# Patient Record
Sex: FEMALE | Race: WHITE | NOT HISPANIC OR LATINO | Employment: FULL TIME | URBAN - METROPOLITAN AREA
[De-identification: names, ages, dates, MRNs, and addresses within clinical notes are randomized per-mention and may not be internally consistent; named-entity substitution may affect disease eponyms.]

---

## 2018-09-05 ENCOUNTER — OFFICE VISIT (OUTPATIENT)
Dept: SPORTS MEDICINE | Facility: CLINIC | Age: 53
End: 2018-09-05
Payer: COMMERCIAL

## 2018-09-05 VITALS
DIASTOLIC BLOOD PRESSURE: 70 MMHG | HEIGHT: 67 IN | SYSTOLIC BLOOD PRESSURE: 117 MMHG | WEIGHT: 189 LBS | HEART RATE: 69 BPM | BODY MASS INDEX: 29.66 KG/M2

## 2018-09-05 DIAGNOSIS — M70.62 TROCHANTERIC BURSITIS OF LEFT HIP: Primary | ICD-10-CM

## 2018-09-05 PROCEDURE — 99999 PR PBB SHADOW E&M-NEW PATIENT-LVL III: CPT | Mod: PBBFAC,,, | Performed by: ORTHOPAEDIC SURGERY

## 2018-09-05 PROCEDURE — 3008F BODY MASS INDEX DOCD: CPT | Mod: CPTII,S$GLB,, | Performed by: ORTHOPAEDIC SURGERY

## 2018-09-05 PROCEDURE — 99204 OFFICE O/P NEW MOD 45 MIN: CPT | Mod: S$GLB,,, | Performed by: ORTHOPAEDIC SURGERY

## 2018-09-05 RX ORDER — LOSARTAN POTASSIUM 25 MG/1
25 TABLET ORAL NIGHTLY
COMMUNITY

## 2018-09-05 NOTE — PROGRESS NOTES
CC: left hip pain    HPI:   Estela Vázquez is a pleasant 53 y.o. 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 She was self referred. She recently moved here from [facility].    She points to the lateral hip as the location of her pain. Pain has been present since March 2018. She denies trauma, no [facility] sxs/instabilty. She had previously seen an orthopaedist in [place] who performed 2 steroid injections into her trochanteric bursa for trochanteric bursitis, most recently 1 month ago, with 50% relief. She has performed PT and taken NSAIDs. She is currently not performing a home stretching program.     Affecting ADLs and exercising    Sleeping, walking, and climbing stairs activity worst    She has a history of bilateral PE after abdominal surgery    Review of Systems   Constitution: Negative. Negative for chills, fever and night sweats.   HENT: Negative for congestion and headaches.    Eyes: Negative for blurred vision, left vision loss and right vision loss.   Cardiovascular: Negative for chest pain and syncope.   Respiratory: Negative for cough and shortness of breath.    Endocrine: Negative for polydipsia, polyphagia and polyuria.   Hematologic/Lymphatic: Negative for bleeding problem. Does not bruise/bleed easily.   Skin: Negative for dry skin, itching and rash.   Musculoskeletal: Negative for falls and muscle weakness.   Gastrointestinal: Negative for abdominal pain and bowel incontinence.   Genitourinary: Negative for bladder incontinence and nocturia.   Neurological: Negative for disturbances in coordination, loss of balance and seizures.   Psychiatric/Behavioral: Negative for depression. The patient does not have insomnia.    Allergic/Immunologic: Negative for hives and persistent infections.     PAST MEDICAL HISTORY:   Past Medical History:   Diagnosis Date    Pulmonary embolism     Bilateral     PAST SURGICAL HISTORY:   Past Surgical  "History:   Procedure Laterality Date     SECTION N/A     HYSTERECTOMY Bilateral     LAPAROSCOPY W/ MINI-LAPAROTOMY Right     SHOULDER ARTHROSCOPY       FAMILY HISTORY: No family history on file.  SOCIAL HISTORY:   Social History     Socioeconomic History    Marital status:      Spouse name: Not on file    Number of children: Not on file    Years of education: Not on file    Highest education level: Not on file   Social Needs    Financial resource strain: Not on file    Food insecurity - worry: Not on file    Food insecurity - inability: Not on file    Transportation needs - medical: Not on file    Transportation needs - non-medical: Not on file   Occupational History    Not on file   Tobacco Use    Smoking status: Never Smoker    Smokeless tobacco: Never Used   Substance and Sexual Activity    Alcohol use: Not on file    Drug use: Not on file    Sexual activity: Not on file   Other Topics Concern    Not on file   Social History Narrative    Not on file       MEDICATIONS:   Current Outpatient Medications:     losartan (COZAAR) 25 MG tablet, Take 25 mg by mouth once daily., Disp: , Rfl:   ALLERGIES:   Review of patient's allergies indicates:   Allergen Reactions    Penicillins Rash       VITAL SIGNS: /70   Pulse 69   Ht 5' 7" (1.702 m)   Wt 85.7 kg (189 lb)   BMI 29.60 kg/m²        PHYSICAL EXAM /  HIP  PHYSICAL EXAMINATION  General:  The patient is alert and oriented x 3.  Mood is pleasant.  Observation of ears, eyes and nose reveal no gross abnormalities.  HEENT: NCAT, sclera nonicteric  Lungs: Respirations are equal and unlabored..    left HIP EXAMINATION     OBSERVATION / INSPECTION  Gait:   Nonantalgic   Alignment:  Neutral   Scars:   None   Muscle atrophy: None   Effusion:  None   Warmth:  None   Discoloration:   None   Leg lengths:   Equal   Pelvis:   Level     TENDERNESS / CREPITUS (T/C):      T / C  Trochanteric bursa   + / -  Piriformis    - / -  SI joint    - " / -  Psoas tendon   - / -  Rectus insertion  - / -  Adductor insertion  - / -  Pubic symphysis  - / -  Abductor muscles  +/-    ROM: (* = pain)    Flexion:    120 degrees  External rotation: 40 degrees  Internal rotation with axial load: 30 degrees  Internal rotation without axial load: 40 degrees  Abduction:  45 degrees  Adduction:   20 degrees    SPECIAL TESTS:  Pain w/ forced internal rotation (FADIR): -   Pain w/ forced external rotation (THOMAS): Absent   Circumduction test:    -  Stinchfield test:    Negative   Log roll:      Negative   Snapping hip (internal):   Negative   Sit-up pain:     Negative   Resisted sit-up pain:    Negative   Resisted sit-up with adductor contraction pain:  Negative   Step-down test:    +  Trendelenburg test:    Negative  Bridge test     ++     EXTREMITY NEURO-VASCULAR EXAMINATION:   Sensation:  Grossly intact to light touch all dermatomal regions.   Motor Function:  Fully intact motor function at hip, knee, foot and ankle    DTRs;  quadriceps and  achilles 2+.  No clonus and downgoing Babinski.    Vascular status:  DP and PT pulses 2+, brisk capillary refill, symmetric.    Skin:  intact, compartments soft.    OTHER FINDINGS:  Pain with resisted abduction, but with 5/5 strength    XRAYS:  None obtained today    Outside MRI reviewed by me demonstrating significant edema of the trochanteric bursa and partial gluteus minimus tendon insertion    ASSESSMENT:    1. left hip abd/core weakness  2. Trochanteric bursitis  3. Partial abductor tendon tear    PLAN:  she would benefit from hip bursectomy, given the above.     We have discussed the surgery and recovery of arthroscopic hip bursectomy. she understands that there may be limited weightbearing up to several weeks after surgery depending on procedures that are performed at the time of surgery.    The spectrum of treatment options were discussed with the patient, including nonoperative and operative options.  After thorough discussion,  the patient has elected to undergo surgical treatment to include:  left   a. Hip arthroscopic trochanteric bursectomy with gluteal sling release    The details of the surgical procedure were explained, including the location of probable incisions and a description of likely hardware and/or grafts to be used.  The patient understands the likely convalescence after surgery.  Also, we have thoroughly discussed the risks, benefits and alternatives to surgery, including, but not limited to, the risk of infection, joint stiffness, blood clot (including DVT and/or pulmonary embolus), neurologic and vascular injury.  It was explained that, if tissue has been repaired or reconstructed, there is a chance of failure, which may require further management.      She has a history of bilateral PE after abdominal surgery therefore we will place the patient on Lovenox post-operatively x 6 weeks.   She would like to have surgery Nov 15. She will need medical clearance

## 2018-10-15 ENCOUNTER — TELEPHONE (OUTPATIENT)
Dept: SPORTS MEDICINE | Facility: CLINIC | Age: 53
End: 2018-10-15

## 2018-10-15 NOTE — TELEPHONE ENCOUNTER
I called and spoke with the patient and she notes that her PCP would not schedule her an appointment until they had some information from our office. I called her PCP Dr. Lua at [phone] and left a message with the patient's surgical information, etc. They were encouraged to call back with any additional questions

## 2018-10-15 NOTE — TELEPHONE ENCOUNTER
----- Message from Aleisha Phillips sent at 10/15/2018 10:53 AM CDT -----  Contact: patient  Please call pt at [phone]. Patient has questions regarding the requirements for her  medical clearance     Thank you

## 2018-10-22 DIAGNOSIS — M70.62 TROCHANTERIC BURSITIS OF LEFT HIP: Primary | ICD-10-CM

## 2018-10-25 ENCOUNTER — PATIENT MESSAGE (OUTPATIENT)
Dept: SURGERY | Facility: OTHER | Age: 53
End: 2018-10-25

## 2018-11-14 ENCOUNTER — HOSPITAL ENCOUNTER (OUTPATIENT)
Dept: PREADMISSION TESTING | Facility: OTHER | Age: 53
Discharge: HOME | End: 2018-11-14
Attending: ORTHOPAEDIC SURGERY
Payer: COMMERCIAL

## 2018-11-14 ENCOUNTER — OFFICE VISIT (OUTPATIENT)
Dept: SPORTS MEDICINE | Facility: CLINIC | Age: 53
End: 2018-11-14
Payer: COMMERCIAL

## 2018-11-14 ENCOUNTER — ANESTHESIA EVENT (OUTPATIENT)
Dept: SURGERY | Facility: OTHER | Age: 53
End: 2018-11-14
Payer: COMMERCIAL

## 2018-11-14 VITALS
WEIGHT: 180 LBS | DIASTOLIC BLOOD PRESSURE: 64 MMHG | TEMPERATURE: 98 F | HEIGHT: 67 IN | SYSTOLIC BLOOD PRESSURE: 103 MMHG | OXYGEN SATURATION: 99 % | HEART RATE: 62 BPM | BODY MASS INDEX: 28.25 KG/M2

## 2018-11-14 VITALS
HEART RATE: 62 BPM | DIASTOLIC BLOOD PRESSURE: 72 MMHG | HEIGHT: 67 IN | BODY MASS INDEX: 28.25 KG/M2 | SYSTOLIC BLOOD PRESSURE: 117 MMHG | WEIGHT: 180 LBS

## 2018-11-14 DIAGNOSIS — M70.62 TROCHANTERIC BURSITIS OF LEFT HIP: Primary | ICD-10-CM

## 2018-11-14 PROCEDURE — 99999 PR PBB SHADOW E&M-EST. PATIENT-LVL IV: CPT | Mod: PBBFAC,,, | Performed by: PHYSICIAN ASSISTANT

## 2018-11-14 PROCEDURE — 99499 UNLISTED E&M SERVICE: CPT | Mod: S$GLB,,, | Performed by: PHYSICIAN ASSISTANT

## 2018-11-14 RX ORDER — OXYCODONE HYDROCHLORIDE 5 MG/1
5 TABLET ORAL ONCE AS NEEDED
Status: CANCELLED | OUTPATIENT
Start: 2018-11-14 | End: 2018-11-14

## 2018-11-14 RX ORDER — OMEPRAZOLE 20 MG/1
20 CAPSULE, DELAYED RELEASE ORAL DAILY
Qty: 25 CAPSULE | Refills: 1 | Status: SHIPPED | OUTPATIENT
Start: 2018-11-14 | End: 2021-06-02

## 2018-11-14 RX ORDER — MIDAZOLAM HYDROCHLORIDE 5 MG/ML
5 INJECTION INTRAMUSCULAR; INTRAVENOUS ONCE AS NEEDED
Status: CANCELLED | OUTPATIENT
Start: 2018-11-14 | End: 2018-11-14

## 2018-11-14 RX ORDER — LIDOCAINE HYDROCHLORIDE 10 MG/ML
0.5 INJECTION, SOLUTION EPIDURAL; INFILTRATION; INTRACAUDAL; PERINEURAL ONCE
Status: CANCELLED | OUTPATIENT
Start: 2018-11-14 | End: 2018-11-14

## 2018-11-14 RX ORDER — ASPIRIN 325 MG
325 TABLET, DELAYED RELEASE (ENTERIC COATED) ORAL EVERY 12 HOURS
Qty: 84 TABLET | Refills: 0 | Status: ON HOLD | OUTPATIENT
Start: 2018-11-14 | End: 2018-11-15 | Stop reason: CLARIF

## 2018-11-14 RX ORDER — PREGABALIN 25 MG/1
75 CAPSULE ORAL
Status: CANCELLED | OUTPATIENT
Start: 2018-11-14 | End: 2018-11-14

## 2018-11-14 RX ORDER — TRAMADOL HYDROCHLORIDE 50 MG/1
50-100 TABLET ORAL EVERY 6 HOURS PRN
Qty: 28 TABLET | Refills: 0 | Status: SHIPPED | OUTPATIENT
Start: 2018-11-14 | End: 2021-06-02

## 2018-11-14 RX ORDER — FAMOTIDINE 20 MG/1
20 TABLET, FILM COATED ORAL
Status: CANCELLED | OUTPATIENT
Start: 2018-11-14 | End: 2018-11-14

## 2018-11-14 RX ORDER — HYDROCODONE BITARTRATE AND ACETAMINOPHEN 10; 325 MG/1; MG/1
TABLET ORAL
Qty: 28 TABLET | Refills: 0 | Status: SHIPPED | OUTPATIENT
Start: 2018-11-14 | End: 2021-06-02

## 2018-11-14 RX ORDER — SODIUM CHLORIDE, SODIUM LACTATE, POTASSIUM CHLORIDE, CALCIUM CHLORIDE 600; 310; 30; 20 MG/100ML; MG/100ML; MG/100ML; MG/100ML
INJECTION, SOLUTION INTRAVENOUS CONTINUOUS
Status: CANCELLED | OUTPATIENT
Start: 2018-11-14

## 2018-11-14 RX ORDER — PROMETHAZINE HYDROCHLORIDE 25 MG/1
25 TABLET ORAL EVERY 6 HOURS PRN
Qty: 16 TABLET | Refills: 0 | Status: SHIPPED | OUTPATIENT
Start: 2018-11-14 | End: 2021-06-02

## 2018-11-14 NOTE — ANESTHESIA PREPROCEDURE EVALUATION
11/14/2018  Estela Vázquez is a 53 y.o., female.    Anesthesia Evaluation    I have reviewed the Patient Summary Reports.    I have reviewed the Nursing Notes.   I have reviewed the Medications.     Review of Systems  Anesthesia Hx:  No problems with previous Anesthesia    Social:  Social Alcohol Use, No Alcohol Use    Hematology/Oncology:  Hematology Normal   Oncology Normal     EENT/Dental:EENT/Dental Normal   Cardiovascular:  Cardiovascular Normal Exercise tolerance: good     Pulmonary:  Pulmonary Normal    Renal/:  Renal/ Normal     Hepatic/GI:  Hepatic/GI Normal    Musculoskeletal:  Musculoskeletal Normal    Neurological:  Neurology Normal    Endocrine:  Endocrine Normal    Dermatological:  Skin Normal    Psych:  Psychiatric Normal           Physical Exam  General:  Well nourished    Airway/Jaw/Neck:  Airway Findings: Mouth Opening: Normal Tongue: Normal  General Airway Assessment: Adult, Good  Mallampati: I  TM Distance: Normal, at least 6 cm  Jaw/Neck Findings:  Neck ROM: Normal ROM      Dental:  Dental Findings: In tact        Mental Status:  Mental Status Findings:  Cooperative, Alert and Oriented         Anesthesia Plan  Type of Anesthesia, risks & benefits discussed:  Anesthesia Type:  general  Patient's Preference:   Intra-op Monitoring Plan: standard ASA monitors  Intra-op Monitoring Plan Comments:   Post Op Pain Control Plan: multimodal analgesia  Post Op Pain Control Plan Comments:   Induction:   IV  Beta Blocker:         Informed Consent: Patient understands risks and agrees with Anesthesia plan.  Questions answered. Anesthesia consent signed with patient.  ASA Score: 2     Day of Surgery Review of History & Physical:    H&P update referred to the surgeon.     Anesthesia Plan Notes: Cleared by cardiology, pt has multiple PVCs. Pt is a nurse. Outside labs are good.        Ready For  Surgery From Anesthesia Perspective.

## 2018-11-14 NOTE — DISCHARGE INSTRUCTIONS
PRE-ADMIT TESTING -  [phone]    [address]  [place] [facility]          Your surgery has been scheduled at [facility]. We are pleased to have the opportunity to serve you. For Further Information please call [phone].    On the day of surgery please report to the Information Desk on the 1st floor.    · CONTACT YOUR PHYSICIAN'S OFFICE THE DAY PRIOR TO YOUR SURGERY TO OBTAIN YOUR ARRIVAL TIME.     · The evening before surgery do not eat anything after 9 p.m. ( this includes hard candy, chewing gum and mints).  You may only have GATORADE, POWERADE AND WATER  from 9 p.m. until you leave your home.   DO NOT DRINK ANY LIQUIDS ON THE WAY TO THE HOSPITAL.      SPECIAL MEDICATION INSTRUCTIONS: TAKE medications checked off by the Anesthesiologist on your Medication List.    Angiogram Patients: Take medications as instructed by your physician, including aspirin.     Surgery Patients:    If you take ASPIRIN - Your PHYSICIAN/SURGEON will need to inform you IF/OR when you need to stop taking aspirin prior to your surgery.     Do Not take any medications containing IBUPROFEN.  Do Not Wear any make-up or dark nail polish   (especially eye make-up) to surgery. If you come to surgery with makeup on you will be required to remove the makeup or nail polish.    Do not shave your surgical area at least 5 days prior to your surgery. The surgical prep will be performed at the hospital according to Infection Control regulations.    Leave all valuables at home.   Do Not wear any jewelry or watches, including any metal in body piercings.  Contact Lens must be removed before surgery. Either do not wear the contact lens or bring a case and solution for storage.  Please bring a container for eyeglasses or dentures as required.  Bring any paperwork your physician has provided, such as consent forms,  history and physicals, doctor's orders, etc.   Bring comfortable clothes that are loose fitting to wear upon  discharge. Take into consideration the type of surgery being performed.  Maintain your diet as advised per your physician the day prior to surgery.      Adequate rest the night before surgery is advised.   Park in the Parking lot behind the hospital or in the [facility] across the street from the parking lot. Parking is complimentary.  If you will be discharged the same day as your procedure, please arrange for a responsible adult to drive you home or to accompany you if traveling by taxi.   YOU WILL NOT BE PERMITTED TO DRIVE OR TO LEAVE THE HOSPITAL ALONE AFTER SURGERY.   It is strongly recommended that you arrange for someone to remain with you for the first 24 hrs following your surgery.       Thank you for your cooperation.  The Staff of [facility].        Bathing Instructions                                                                 Please shower the evening before and morning of your procedure with    ANTIBACTERIAL SOAP. ( DIAL, etc )  Concentrate on the surgical area   for at least 3 minutes and rinse completely. Dry off as usual.   Do not use any deodorant, powder, body lotions, perfume, after shave or    cologne.

## 2018-11-15 ENCOUNTER — HOSPITAL ENCOUNTER (OUTPATIENT)
Facility: OTHER | Age: 53
Discharge: HOME | End: 2018-11-15
Attending: ORTHOPAEDIC SURGERY | Admitting: ORTHOPAEDIC SURGERY
Payer: COMMERCIAL

## 2018-11-15 ENCOUNTER — TELEPHONE (OUTPATIENT)
Dept: SPORTS MEDICINE | Facility: CLINIC | Age: 53
End: 2018-11-15

## 2018-11-15 ENCOUNTER — ANESTHESIA (OUTPATIENT)
Dept: SURGERY | Facility: OTHER | Age: 53
End: 2018-11-15
Payer: COMMERCIAL

## 2018-11-15 VITALS
TEMPERATURE: 99 F | RESPIRATION RATE: 16 BRPM | HEART RATE: 56 BPM | DIASTOLIC BLOOD PRESSURE: 58 MMHG | BODY MASS INDEX: 28.25 KG/M2 | SYSTOLIC BLOOD PRESSURE: 106 MMHG | HEIGHT: 67 IN | WEIGHT: 180 LBS | OXYGEN SATURATION: 96 %

## 2018-11-15 DIAGNOSIS — Z86.718 HISTORY OF DVT (DEEP VEIN THROMBOSIS): ICD-10-CM

## 2018-11-15 DIAGNOSIS — M70.62 TROCHANTERIC BURSITIS OF LEFT HIP: Primary | ICD-10-CM

## 2018-11-15 DIAGNOSIS — M70.62 TROCHANTERIC BURSITIS OF LEFT HIP: ICD-10-CM

## 2018-11-15 PROCEDURE — 27025 FASCIOTOMY HIP/THIGH ANY TYP: CPT | Mod: 51,LT,, | Performed by: ORTHOPAEDIC SURGERY

## 2018-11-15 PROCEDURE — 90686 IIV4 VACC NO PRSV 0.5 ML IM: CPT | Performed by: ORTHOPAEDIC SURGERY

## 2018-11-15 PROCEDURE — 71000033 HC RECOVERY, INTIAL HOUR: Performed by: ORTHOPAEDIC SURGERY

## 2018-11-15 PROCEDURE — 25000003 PHARM REV CODE 250: Performed by: PHYSICIAN ASSISTANT

## 2018-11-15 PROCEDURE — 63600175 PHARM REV CODE 636 W HCPCS: Performed by: NURSE ANESTHETIST, CERTIFIED REGISTERED

## 2018-11-15 PROCEDURE — 63600175 PHARM REV CODE 636 W HCPCS: Performed by: ORTHOPAEDIC SURGERY

## 2018-11-15 PROCEDURE — 71000015 HC POSTOP RECOV 1ST HR: Performed by: ORTHOPAEDIC SURGERY

## 2018-11-15 PROCEDURE — 37000008 HC ANESTHESIA 1ST 15 MINUTES: Performed by: ORTHOPAEDIC SURGERY

## 2018-11-15 PROCEDURE — 25000003 PHARM REV CODE 250: Performed by: NURSE ANESTHETIST, CERTIFIED REGISTERED

## 2018-11-15 PROCEDURE — 36000710: Performed by: ORTHOPAEDIC SURGERY

## 2018-11-15 PROCEDURE — 90471 IMMUNIZATION ADMIN: CPT | Performed by: ORTHOPAEDIC SURGERY

## 2018-11-15 PROCEDURE — 27201423 OPTIME MED/SURG SUP & DEVICES STERILE SUPPLY: Performed by: ORTHOPAEDIC SURGERY

## 2018-11-15 PROCEDURE — 25000003 PHARM REV CODE 250: Performed by: ORTHOPAEDIC SURGERY

## 2018-11-15 PROCEDURE — 25000003 PHARM REV CODE 250: Performed by: SPECIALIST

## 2018-11-15 PROCEDURE — 36000711: Performed by: ORTHOPAEDIC SURGERY

## 2018-11-15 PROCEDURE — 71000039 HC RECOVERY, EACH ADD'L HOUR: Performed by: ORTHOPAEDIC SURGERY

## 2018-11-15 PROCEDURE — 63600175 PHARM REV CODE 636 W HCPCS: Performed by: PHYSICIAN ASSISTANT

## 2018-11-15 PROCEDURE — 27062 REMOVE FEMUR LESION/BURSA: CPT | Mod: LT,,, | Performed by: ORTHOPAEDIC SURGERY

## 2018-11-15 PROCEDURE — 37000009 HC ANESTHESIA EA ADD 15 MINS: Performed by: ORTHOPAEDIC SURGERY

## 2018-11-15 PROCEDURE — 71000016 HC POSTOP RECOV ADDL HR: Performed by: ORTHOPAEDIC SURGERY

## 2018-11-15 RX ORDER — ONDANSETRON HYDROCHLORIDE 2 MG/ML
INJECTION, SOLUTION INTRAMUSCULAR; INTRAVENOUS
Status: DISCONTINUED | OUTPATIENT
Start: 2018-11-15 | End: 2018-11-15

## 2018-11-15 RX ORDER — KETAMINE HYDROCHLORIDE 50 MG/ML
INJECTION, SOLUTION INTRAMUSCULAR; INTRAVENOUS
Status: DISCONTINUED | OUTPATIENT
Start: 2018-11-15 | End: 2018-11-15 | Stop reason: HOSPADM

## 2018-11-15 RX ORDER — MEPERIDINE HYDROCHLORIDE 50 MG/ML
12.5 INJECTION INTRAMUSCULAR; INTRAVENOUS; SUBCUTANEOUS ONCE AS NEEDED
Status: DISCONTINUED | OUTPATIENT
Start: 2018-11-15 | End: 2018-11-15 | Stop reason: HOSPADM

## 2018-11-15 RX ORDER — EPINEPHRINE 1 MG/ML
INJECTION, SOLUTION INTRACARDIAC; INTRAMUSCULAR; INTRAVENOUS; SUBCUTANEOUS
Status: DISCONTINUED | OUTPATIENT
Start: 2018-11-15 | End: 2018-11-15 | Stop reason: HOSPADM

## 2018-11-15 RX ORDER — DEXAMETHASONE SODIUM PHOSPHATE 4 MG/ML
INJECTION, SOLUTION INTRA-ARTICULAR; INTRALESIONAL; INTRAMUSCULAR; INTRAVENOUS; SOFT TISSUE
Status: DISCONTINUED | OUTPATIENT
Start: 2018-11-15 | End: 2018-11-15

## 2018-11-15 RX ORDER — NEOSTIGMINE METHYLSULFATE 1 MG/ML
INJECTION, SOLUTION INTRAVENOUS
Status: DISCONTINUED | OUTPATIENT
Start: 2018-11-15 | End: 2018-11-15

## 2018-11-15 RX ORDER — MIDAZOLAM HYDROCHLORIDE 5 MG/ML
5 INJECTION INTRAMUSCULAR; INTRAVENOUS ONCE AS NEEDED
Status: DISCONTINUED | OUTPATIENT
Start: 2018-11-15 | End: 2018-11-15 | Stop reason: HOSPADM

## 2018-11-15 RX ORDER — ENOXAPARIN SODIUM 100 MG/ML
40 INJECTION SUBCUTANEOUS DAILY
Qty: 42 SYRINGE | Refills: 0 | Status: SHIPPED | OUTPATIENT
Start: 2018-11-15 | End: 2018-12-27

## 2018-11-15 RX ORDER — OXYCODONE HYDROCHLORIDE 5 MG/1
5 TABLET ORAL
Status: DISCONTINUED | OUTPATIENT
Start: 2018-11-15 | End: 2018-11-15 | Stop reason: HOSPADM

## 2018-11-15 RX ORDER — HYDROMORPHONE HYDROCHLORIDE 2 MG/ML
0.4 INJECTION, SOLUTION INTRAMUSCULAR; INTRAVENOUS; SUBCUTANEOUS EVERY 5 MIN PRN
Status: DISCONTINUED | OUTPATIENT
Start: 2018-11-15 | End: 2018-11-15 | Stop reason: HOSPADM

## 2018-11-15 RX ORDER — OXYCODONE HYDROCHLORIDE 5 MG/1
10 TABLET ORAL EVERY 4 HOURS PRN
Status: DISCONTINUED | OUTPATIENT
Start: 2018-11-15 | End: 2018-11-15 | Stop reason: HOSPADM

## 2018-11-15 RX ORDER — PROPOFOL 10 MG/ML
VIAL (ML) INTRAVENOUS
Status: DISCONTINUED | OUTPATIENT
Start: 2018-11-15 | End: 2018-11-15

## 2018-11-15 RX ORDER — KETOROLAC TROMETHAMINE 30 MG/ML
INJECTION, SOLUTION INTRAMUSCULAR; INTRAVENOUS
Status: DISCONTINUED | OUTPATIENT
Start: 2018-11-15 | End: 2018-11-15 | Stop reason: HOSPADM

## 2018-11-15 RX ORDER — EPHEDRINE SULFATE 50 MG/ML
INJECTION, SOLUTION INTRAVENOUS
Status: DISCONTINUED | OUTPATIENT
Start: 2018-11-15 | End: 2018-11-15

## 2018-11-15 RX ORDER — LIDOCAINE HYDROCHLORIDE 10 MG/ML
0.5 INJECTION, SOLUTION EPIDURAL; INFILTRATION; INTRACAUDAL; PERINEURAL ONCE
Status: DISCONTINUED | OUTPATIENT
Start: 2018-11-15 | End: 2018-11-15 | Stop reason: HOSPADM

## 2018-11-15 RX ORDER — KETOROLAC TROMETHAMINE 30 MG/ML
INJECTION, SOLUTION INTRAMUSCULAR; INTRAVENOUS
Status: DISCONTINUED | OUTPATIENT
Start: 2018-11-15 | End: 2018-11-15

## 2018-11-15 RX ORDER — LIDOCAINE HCL/PF 100 MG/5ML
SYRINGE (ML) INTRAVENOUS
Status: DISCONTINUED | OUTPATIENT
Start: 2018-11-15 | End: 2018-11-15

## 2018-11-15 RX ORDER — FENTANYL CITRATE 50 UG/ML
25 INJECTION, SOLUTION INTRAMUSCULAR; INTRAVENOUS EVERY 5 MIN PRN
Status: DISCONTINUED | OUTPATIENT
Start: 2018-11-15 | End: 2018-11-15 | Stop reason: HOSPADM

## 2018-11-15 RX ORDER — ENOXAPARIN SODIUM 100 MG/ML
40 INJECTION SUBCUTANEOUS DAILY
Qty: 42 SYRINGE | Refills: 0 | Status: SHIPPED | OUTPATIENT
Start: 2018-11-15 | End: 2018-11-15 | Stop reason: RX

## 2018-11-15 RX ORDER — ROPIVACAINE HYDROCHLORIDE 5 MG/ML
INJECTION, SOLUTION EPIDURAL; INFILTRATION; PERINEURAL
Status: DISCONTINUED | OUTPATIENT
Start: 2018-11-15 | End: 2018-11-15 | Stop reason: HOSPADM

## 2018-11-15 RX ORDER — MIDAZOLAM HYDROCHLORIDE 1 MG/ML
INJECTION INTRAMUSCULAR; INTRAVENOUS
Status: DISCONTINUED | OUTPATIENT
Start: 2018-11-15 | End: 2018-11-15

## 2018-11-15 RX ORDER — PREGABALIN 75 MG/1
75 CAPSULE ORAL ONCE
Status: COMPLETED | OUTPATIENT
Start: 2018-11-15 | End: 2018-11-15

## 2018-11-15 RX ORDER — SODIUM CHLORIDE, SODIUM LACTATE, POTASSIUM CHLORIDE, CALCIUM CHLORIDE 600; 310; 30; 20 MG/100ML; MG/100ML; MG/100ML; MG/100ML
INJECTION, SOLUTION INTRAVENOUS CONTINUOUS
Status: DISCONTINUED | OUTPATIENT
Start: 2018-11-15 | End: 2018-11-15 | Stop reason: HOSPADM

## 2018-11-15 RX ORDER — ROCURONIUM BROMIDE 10 MG/ML
INJECTION, SOLUTION INTRAVENOUS
Status: DISCONTINUED | OUTPATIENT
Start: 2018-11-15 | End: 2018-11-15

## 2018-11-15 RX ORDER — FENTANYL CITRATE 50 UG/ML
INJECTION, SOLUTION INTRAMUSCULAR; INTRAVENOUS
Status: DISCONTINUED | OUTPATIENT
Start: 2018-11-15 | End: 2018-11-15

## 2018-11-15 RX ORDER — PREGABALIN 75 MG/1
75 CAPSULE ORAL
Status: COMPLETED | OUTPATIENT
Start: 2018-11-15 | End: 2018-11-15

## 2018-11-15 RX ORDER — PROMETHAZINE HYDROCHLORIDE 25 MG/1
25 TABLET ORAL EVERY 6 HOURS PRN
Status: DISCONTINUED | OUTPATIENT
Start: 2018-11-15 | End: 2018-11-15 | Stop reason: HOSPADM

## 2018-11-15 RX ORDER — ONDANSETRON 2 MG/ML
4 INJECTION INTRAMUSCULAR; INTRAVENOUS DAILY PRN
Status: DISCONTINUED | OUTPATIENT
Start: 2018-11-15 | End: 2018-11-15 | Stop reason: HOSPADM

## 2018-11-15 RX ORDER — MORPHINE SULFATE 10 MG/ML
3 INJECTION INTRAMUSCULAR; INTRAVENOUS; SUBCUTANEOUS EVERY 10 MIN PRN
Status: DISCONTINUED | OUTPATIENT
Start: 2018-11-15 | End: 2018-11-15 | Stop reason: HOSPADM

## 2018-11-15 RX ORDER — FAMOTIDINE 20 MG/1
20 TABLET, FILM COATED ORAL
Status: COMPLETED | OUTPATIENT
Start: 2018-11-15 | End: 2018-11-15

## 2018-11-15 RX ORDER — SODIUM CHLORIDE 0.9 % (FLUSH) 0.9 %
3 SYRINGE (ML) INJECTION
Status: DISCONTINUED | OUTPATIENT
Start: 2018-11-15 | End: 2018-11-15 | Stop reason: HOSPADM

## 2018-11-15 RX ORDER — GLYCOPYRROLATE 0.2 MG/ML
INJECTION INTRAMUSCULAR; INTRAVENOUS
Status: DISCONTINUED | OUTPATIENT
Start: 2018-11-15 | End: 2018-11-15

## 2018-11-15 RX ORDER — ONDANSETRON 2 MG/ML
4 INJECTION INTRAMUSCULAR; INTRAVENOUS EVERY 12 HOURS PRN
Status: DISCONTINUED | OUTPATIENT
Start: 2018-11-15 | End: 2018-11-15 | Stop reason: HOSPADM

## 2018-11-15 RX ORDER — OXYCODONE HYDROCHLORIDE 5 MG/1
5 TABLET ORAL ONCE AS NEEDED
Status: COMPLETED | OUTPATIENT
Start: 2018-11-15 | End: 2018-11-15

## 2018-11-15 RX ADMIN — SODIUM CHLORIDE, SODIUM LACTATE, POTASSIUM CHLORIDE, AND CALCIUM CHLORIDE: 600; 310; 30; 20 INJECTION, SOLUTION INTRAVENOUS at 10:11

## 2018-11-15 RX ADMIN — SODIUM CHLORIDE, SODIUM LACTATE, POTASSIUM CHLORIDE, AND CALCIUM CHLORIDE: 600; 310; 30; 20 INJECTION, SOLUTION INTRAVENOUS at 04:11

## 2018-11-15 RX ADMIN — FENTANYL CITRATE 50 MCG: 50 INJECTION, SOLUTION INTRAMUSCULAR; INTRAVENOUS at 03:11

## 2018-11-15 RX ADMIN — FENTANYL CITRATE 100 MCG: 50 INJECTION, SOLUTION INTRAMUSCULAR; INTRAVENOUS at 03:11

## 2018-11-15 RX ADMIN — VANCOMYCIN HYDROCHLORIDE 1500 MG: 1 INJECTION, POWDER, LYOPHILIZED, FOR SOLUTION INTRAVENOUS at 10:11

## 2018-11-15 RX ADMIN — MIDAZOLAM HYDROCHLORIDE 2 MG: 1 INJECTION, SOLUTION INTRAMUSCULAR; INTRAVENOUS at 03:11

## 2018-11-15 RX ADMIN — DEXAMETHASONE SODIUM PHOSPHATE 8 MG: 4 INJECTION, SOLUTION INTRAMUSCULAR; INTRAVENOUS at 03:11

## 2018-11-15 RX ADMIN — PREGABALIN 75 MG: 75 CAPSULE ORAL at 06:11

## 2018-11-15 RX ADMIN — KETOROLAC TROMETHAMINE 30 MG: 30 INJECTION, SOLUTION INTRAMUSCULAR; INTRAVENOUS at 04:11

## 2018-11-15 RX ADMIN — EPHEDRINE SULFATE 10 MG: 50 INJECTION INTRAMUSCULAR; INTRAVENOUS; SUBCUTANEOUS at 04:11

## 2018-11-15 RX ADMIN — ONDANSETRON 4 MG: 2 INJECTION, SOLUTION INTRAMUSCULAR; INTRAVENOUS at 03:11

## 2018-11-15 RX ADMIN — EPHEDRINE SULFATE 10 MG: 50 INJECTION INTRAMUSCULAR; INTRAVENOUS; SUBCUTANEOUS at 03:11

## 2018-11-15 RX ADMIN — INFLUENZA A VIRUS A/MICHIGAN/45/2015 X-275 (H1N1) ANTIGEN (FORMALDEHYDE INACTIVATED), INFLUENZA A VIRUS A/SINGAPORE/INFIMH-16-0019/2016 IVR-186 (H3N2) ANTIGEN (FORMALDEHYDE INACTIVATED), INFLUENZA B VIRUS B/PHUKET/3073/2013 ANTIGEN (FORMALDEHYDE INACTIVATED), AND INFLUENZA B VIRUS B/MARYLAND/15/2016 BX-69A ANTIGEN (FORMALDEHYDE INACTIVATED) 0.5 ML: 15; 15; 15; 15 INJECTION, SUSPENSION INTRAMUSCULAR at 06:11

## 2018-11-15 RX ADMIN — LIDOCAINE HYDROCHLORIDE 100 MG: 20 INJECTION, SOLUTION INTRAVENOUS at 03:11

## 2018-11-15 RX ADMIN — FAMOTIDINE 20 MG: 20 TABLET ORAL at 10:11

## 2018-11-15 RX ADMIN — ROCURONIUM BROMIDE 40 MG: 10 INJECTION, SOLUTION INTRAVENOUS at 03:11

## 2018-11-15 RX ADMIN — CARBOXYMETHYLCELLULOSE SODIUM 2 DROP: 2.5 SOLUTION/ DROPS OPHTHALMIC at 03:11

## 2018-11-15 RX ADMIN — OXYCODONE HYDROCHLORIDE 5 MG: 5 TABLET ORAL at 04:11

## 2018-11-15 RX ADMIN — GLYCOPYRROLATE 0.6 MG: 0.2 INJECTION, SOLUTION INTRAMUSCULAR; INTRAVENOUS at 04:11

## 2018-11-15 RX ADMIN — PROPOFOL 150 MG: 10 INJECTION, EMULSION INTRAVENOUS at 03:11

## 2018-11-15 RX ADMIN — NEOSTIGMINE METHYLSULFATE 5 MG: 1 INJECTION INTRAVENOUS at 04:11

## 2018-11-15 RX ADMIN — PREGABALIN 75 MG: 75 CAPSULE ORAL at 10:11

## 2018-11-15 NOTE — OR NURSING
Pt status unchanged.  Vancomycin IVPB completed.  IV flushed.  Assisted pt up to bathroom.  Updated re: surgery time.

## 2018-11-15 NOTE — DISCHARGE SUMMARY
[facility]  Brief Operative Note     SUMMARY     Surgery Date: 11/15/2018     Surgeon(s) and Role:     * Nika Barreto MD - Primary    Assisting Surgeon: None    Pre-op Diagnosis:  Trochanteric bursitis of left hip [M70.62]    Post-op Diagnosis:  Post-Op Diagnosis Codes:     * Trochanteric bursitis of left hip [M70.62]    Procedure(s) (LRB):  REPAIR, Trochanteric Bursectomy (Left)    Anesthesia: General    Description of the findings of the procedure: Left hip arthroscopic trochanteric bursectomy     Findings/Key Components: left hip arthroscopic trochanteric bursectomy    Estimated Blood Loss: minimal         Specimens:   Specimen (12h ago, onward)    None          Discharge Note    SUMMARY     Admit Date: 11/15/2018    Discharge Date and Time:  11/15/2018 5:00 PM    Hospital Course (synopsis of major diagnoses, care, treatment, and services provided during the course of the hospital stay): Patient underwent outpatient hip surgery and was transferred to PACU in stable condition.  In PACU, patient received appropriate post-operative care and discharged home with plans for physical therapy and follow-up with the operative surgeon.    Diet: Regular     Final Diagnosis: Post-Op Diagnosis Codes:     * Trochanteric bursitis of left hip [M70.62]    Disposition: Home or Self Care    Follow Up/Patient Instructions:     Medications:  Reconciled Home Medications:      Medication List      CONTINUE taking these medications    aspirin 325 MG EC tablet  Commonly known as:  ECOTRIN  Take 1 tablet (325 mg total) by mouth every 12 (twelve) hours.     HYDROcodone-acetaminophen  mg per tablet  Commonly known as:  NORCO  Take 1 tablet by mouth every 4-6 hours for pain.     losartan 25 MG tablet  Commonly known as:  COZAAR  Take 25 mg by mouth every evening.     omeprazole 20 MG capsule  Commonly known as:  PriLOSEC  Take 1 capsule (20 mg total) by mouth once daily. To protect your stomach.     promethazine 25  "MG tablet  Commonly known as:  PHENERGAN  Take 1 tablet (25 mg total) by mouth every 6 (six) hours as needed for Nausea.     traMADol 50 mg tablet  Commonly known as:  ULTRAM  Take 1-2 tablets ( mg total) by mouth every 6 (six) hours as needed (surgical pain).          Discharge Procedure Orders   CRUTCHES FOR HOME USE   Order Comments: Provide if needed     Order Specific Question Answer Comments   Type: Axillary    Height: 5' 7" (1.702 m)    Weight: 81.6 kg (179 lb 16 oz)    Does patient have medical equipment at home? none    Length of need (1-99 months): 24      Diet general     Call MD for:  temperature >100.4     Call MD for:  persistent nausea and vomiting     Call MD for:  severe uncontrolled pain     Call MD for:  difficulty breathing, headache or visual disturbances     Call MD for:  redness, tenderness, or signs of infection (pain, swelling, redness, odor or green/yellow discharge around incision site)     Call MD for:  hives     Call MD for:  persistent dizziness or light-headedness     Ice to affected area     No driving, operating heavy equipment or signing legal documents while taking pain medication     Remove dressing in 72 hours     Prior Authorization Order     Order Specific Question Answer Comments   What medications need authorization? LYRICA [[id number]]    Dose 75mg lyrica    Frequency once pre-op and once post-op      Shower on day dressing removed (No bath)     Weight bearing restrictions (specify)   Order Comments: Partial weightbearing of about 50% for 1 week following surgery using crutches and then can begin to wean off of them. PT to start in 7-14 days following surgery.       "

## 2018-11-15 NOTE — H&P
Estela Vázquez  is here for a completion of her perioperative paperwork. she  Is scheduled to undergo    left              a. Hip arthroscopic trochanteric bursectomy with gluteal sling release on 11/15/18.      She is a healthy individual and does need clearance for this procedure which she received from her external PCP, Dr. Chatterjee.     PAST MEDICAL HISTORY:   Past Medical History:   Diagnosis Date    Arrhythmia     bigeminy & trigeminy     followed cardiologist    Pulmonary embolism     Bilateral     PAST SURGICAL HISTORY:   Past Surgical History:   Procedure Laterality Date    APPENDECTOMY       SECTION N/A     HYSTERECTOMY Bilateral     LAPAROSCOPY W/ MINI-LAPAROTOMY Right     SHOULDER ARTHROSCOPY       FAMILY HISTORY: History reviewed. No pertinent family history.  SOCIAL HISTORY:   Social History     Socioeconomic History    Marital status:      Spouse name: Not on file    Number of children: Not on file    Years of education: Not on file    Highest education level: Not on file   Social Needs    Financial resource strain: Not on file    Food insecurity - worry: Not on file    Food insecurity - inability: Not on file    Transportation needs - medical: Not on file    Transportation needs - non-medical: Not on file   Occupational History    Not on file   Tobacco Use    Smoking status: Never Smoker    Smokeless tobacco: Never Used   Substance and Sexual Activity    Alcohol use: Yes     Comment: socially    Drug use: Not on file    Sexual activity: Not on file   Other Topics Concern    Not on file   Social History Narrative    Not on file       MEDICATIONS:   Current Outpatient Medications:     losartan (COZAAR) 25 MG tablet, Take 25 mg by mouth every evening. , Disp: , Rfl:     aspirin (ECOTRIN) 325 MG EC tablet, Take 1 tablet (325 mg total) by mouth every 12 (twelve) hours., Disp: 84 tablet, Rfl: 0    HYDROcodone-acetaminophen (NORCO)  mg per tablet, Take 1  "tablet by mouth every 4-6 hours for pain., Disp: 28 tablet, Rfl: 0    omeprazole (PRILOSEC) 20 MG capsule, Take 1 capsule (20 mg total) by mouth once daily. To protect your stomach., Disp: 25 capsule, Rfl: 1    promethazine (PHENERGAN) 25 MG tablet, Take 1 tablet (25 mg total) by mouth every 6 (six) hours as needed for Nausea., Disp: 16 tablet, Rfl: 0    traMADol (ULTRAM) 50 mg tablet, Take 1-2 tablets ( mg total) by mouth every 6 (six) hours as needed (surgical pain)., Disp: 28 tablet, Rfl: 0  ALLERGIES:   Review of patient's allergies indicates:   Allergen Reactions    Penicillins Rash       VITAL SIGNS: /72   Pulse 62   Ht 5' 7" (1.702 m)   Wt 81.6 kg (180 lb)   BMI 28.19 kg/m²      Risks, indications and benefits of the surgical procedure were discussed with the patient. All questions with regard to surgery, rehab, expected return to functional activities, activities of daily living and recreational endeavors were answered to her satisfaction.    It was explained to the patient that there may be an increase in surgical risks if the patient has certain co-morbidities such as but not limited to: Obesity, Cardiovascular issues (CHF, CAD, Arrhythmias), chronic pulmonary issues, previous or current neurovascular/neurological issues, previous strokes, diabetes mellitus, previous wound healing issues, previous wound or skin infections, PVD, clotting disorders, if the patient uses chronic steroids, if the patient takes or has immune compromising medications or diseases, or has previously or currently used tobacco products.     The patient verbalized that he/she does not have any additional clotting, bleeding, or blood disorders, other than what is list in her chart on today's review.     Then a brief history and physical exam were performed.    Review of Systems   Constitution: Negative. Negative for chills, fever and night sweats.   HENT: Negative for congestion and headaches.    Eyes: Negative for " blurred vision, left vision loss and right vision loss.   Cardiovascular: Negative for chest pain and syncope.   Respiratory: Negative for cough and shortness of breath.    Endocrine: Negative for polydipsia, polyphagia and polyuria.   Hematologic/Lymphatic: Negative for bleeding problem. Does not bruise/bleed easily.   Skin: Negative for dry skin, itching and rash.   Musculoskeletal: Negative for falls and muscle weakness.   Gastrointestinal: Negative for abdominal pain and bowel incontinence.   Genitourinary: Negative for bladder incontinence and nocturia.   Neurological: Negative for disturbances in coordination, loss of balance and seizures.   Psychiatric/Behavioral: Negative for depression. The patient does not have insomnia.    Allergic/Immunologic: Negative for hives and persistent infections.     PHYSICAL EXAM:  GEN: A&Ox3, WD WN NAD  HEENT: WNL  CHEST: CTAB, no W/R/R  HEART: RRR, no M/R/G  ABD: Soft, NT ND, BS x4 QUADS  MS; See Epic  NEURO: CN II-XII intact       The surgical consent was then reviewed with the patient, who agreed with all the contents of the consent form and it was signed. she was then given the [facility] [place] surgery packet to bring with her to [facility] for the anesthesia portion of her perioperative paperwork.   For all physicians except for Dr. Gama, we will email and possibly fax the consent forms and [facility]    The patient was given the opportunity to ask questions about the surgical plan and consent form, and once no other questions were asked, I proceeded with the pre-op appointment.    PHYSICAL THERAPY:  She was also instructed regarding physical therapy and will begin on  1-2 weeks post-op. She was given a copy of the original prescription to schedule. Another copy of this prescription was also faxed to [facility] where she will start and then transition to a PT place back home in [place].    POST OP CARE:instructions were reviewed  including care of the wound and dressing after surgery and when she can shower.     PAIN MANAGEMENT: Estela Vázquez was also given her pain management regime, which includes the TENS unit given to her by priti along with the education required for its use. She was also instructed regarding the Polar ice unit that will be in place after surgery and her postoperative pain medications.     PAIN MEDICATION:  Percocet 10/325mg 1 po q 4-6 hours prn pain  Ultram 50 mg Take 1-2 p.o. q.6 hours p.r.n. breakthrough pain,   Phenergan 25 mg one p.o. q.6 hours p.r.n. nausea and vomiting.    DVT prophylaxis was discussed with the patient today including risk factors for developing DVTs and history of DVTs. The patient was asked if any specific recommendations were given from the doctor/s that did pre-operative surgical clearance. The patient was then given an education sheet about DVTs and PE with warning signs and symptoms of both and steps to take if they suspect either of these.    This along with the Modified Caprini risk assessment model for VTE in general surgical patients was used to determine the patient's DVT risk.     From: Dacia MK, Abel DA, Megan SM, et al. Prevention of VTE in nonorthopedic surgical patients: antithrombotic therapy and prevention of thrombosis, 9th ed: American College of Chest Physicians evidence-based clinical practical guidelines. Chest 2012; 141:e227S. Copyright © 2012. Reproduced with permission from the American College of Chest Physicians.    The below listed DVT prophylaxis regimen along with bilateral CORY compression stockings will be used post-op. Length of treatment has been determined to be 10-42 days post-op by the above noted Caprini assessment model.     The patient was instructed to buy and take:  Aspirin 325mg BID x 6 weeks for DVT prophylaxis starting on the evening after surgery.  Patient will also use bilateral TEDs on lower extremities, SCDs during surgery, and early  ambulation post-op. If the patient was previously taking 81mg baby aspirin, they were told to not take it will using the above stated aspirin and to restart the 81mg aspirin after completion of the aspirin dose.     Patient was also told to buy over the counter Prilosec medication and take it once daily for GI protection as long as they are taking NSAIDs or Aspirin.     Published data in Ben STALLWORTH, et al. J Arthroplasty. Oct; 31(10):2237-40, 2016; showed that aspirin use as prophylaxis during revision total joint arthroplasty was more effective than warfarin in preventing symptomatic venous thromboembolic events and was associated with lower complications.  Patients in the study were also treated with intermittent pneumatic compression devices. Compression stockings would be our method of mechanical prophylaxis, which has been shown to be similar to pneumatic compression in the systematic review, All RJ, et al. Jaycee Surg. Feb; 239(2): 162-171, 2004.     Results showed a significantly higher incidence of symptomatic venous thromboembolic events among patients in the warfarin group vs. the aspirin group (1.75% vs. 0.56%). Researchers also noted a bleeding event rate of 1.5% among patients who received warfarin compared with a rate of 0.4% among patients who received aspirin.    Patient denies history of seizures.     The patient was told that narcotic pain medications may make them drowsy and instructions were given to not sign legal documents, drive or operate heavy machinery, cars, or equipment while under the influence of narcotic medications. The patient was told and understands that narcotic pain medications should only be used as needed to control pain and that other options of pain control include TENs unit and ice packs/unit.     As there were no other questions to be asked, she was given my business card along with Nika Barreto MD business card if she has any questions or concerns prior to surgery or in  the postop period.

## 2018-11-15 NOTE — H&P (VIEW-ONLY)
Estela Vázquez  is here for a completion of her perioperative paperwork. she  Is scheduled to undergo    left              a. Hip arthroscopic trochanteric bursectomy with gluteal sling release on 11/15/18.      She is a healthy individual and does need clearance for this procedure which she received from her external PCP, Dr. Chatterjee.     PAST MEDICAL HISTORY:   Past Medical History:   Diagnosis Date    Arrhythmia     bigeminy & trigeminy     followed cardiologist    Pulmonary embolism     Bilateral     PAST SURGICAL HISTORY:   Past Surgical History:   Procedure Laterality Date    APPENDECTOMY       SECTION N/A     HYSTERECTOMY Bilateral     LAPAROSCOPY W/ MINI-LAPAROTOMY Right     SHOULDER ARTHROSCOPY       FAMILY HISTORY: History reviewed. No pertinent family history.  SOCIAL HISTORY:   Social History     Socioeconomic History    Marital status:      Spouse name: Not on file    Number of children: Not on file    Years of education: Not on file    Highest education level: Not on file   Social Needs    Financial resource strain: Not on file    Food insecurity - worry: Not on file    Food insecurity - inability: Not on file    Transportation needs - medical: Not on file    Transportation needs - non-medical: Not on file   Occupational History    Not on file   Tobacco Use    Smoking status: Never Smoker    Smokeless tobacco: Never Used   Substance and Sexual Activity    Alcohol use: Yes     Comment: socially    Drug use: Not on file    Sexual activity: Not on file   Other Topics Concern    Not on file   Social History Narrative    Not on file       MEDICATIONS:   Current Outpatient Medications:     losartan (COZAAR) 25 MG tablet, Take 25 mg by mouth every evening. , Disp: , Rfl:     aspirin (ECOTRIN) 325 MG EC tablet, Take 1 tablet (325 mg total) by mouth every 12 (twelve) hours., Disp: 84 tablet, Rfl: 0    HYDROcodone-acetaminophen (NORCO)  mg per tablet, Take 1  "tablet by mouth every 4-6 hours for pain., Disp: 28 tablet, Rfl: 0    omeprazole (PRILOSEC) 20 MG capsule, Take 1 capsule (20 mg total) by mouth once daily. To protect your stomach., Disp: 25 capsule, Rfl: 1    promethazine (PHENERGAN) 25 MG tablet, Take 1 tablet (25 mg total) by mouth every 6 (six) hours as needed for Nausea., Disp: 16 tablet, Rfl: 0    traMADol (ULTRAM) 50 mg tablet, Take 1-2 tablets ( mg total) by mouth every 6 (six) hours as needed (surgical pain)., Disp: 28 tablet, Rfl: 0  ALLERGIES:   Review of patient's allergies indicates:   Allergen Reactions    Penicillins Rash       VITAL SIGNS: /72   Pulse 62   Ht 5' 7" (1.702 m)   Wt 81.6 kg (180 lb)   BMI 28.19 kg/m²      Risks, indications and benefits of the surgical procedure were discussed with the patient. All questions with regard to surgery, rehab, expected return to functional activities, activities of daily living and recreational endeavors were answered to her satisfaction.    It was explained to the patient that there may be an increase in surgical risks if the patient has certain co-morbidities such as but not limited to: Obesity, Cardiovascular issues (CHF, CAD, Arrhythmias), chronic pulmonary issues, previous or current neurovascular/neurological issues, previous strokes, diabetes mellitus, previous wound healing issues, previous wound or skin infections, PVD, clotting disorders, if the patient uses chronic steroids, if the patient takes or has immune compromising medications or diseases, or has previously or currently used tobacco products.     The patient verbalized that he/she does not have any additional clotting, bleeding, or blood disorders, other than what is list in her chart on today's review.     Then a brief history and physical exam were performed.    Review of Systems   Constitution: Negative. Negative for chills, fever and night sweats.   HENT: Negative for congestion and headaches.    Eyes: Negative for " blurred vision, left vision loss and right vision loss.   Cardiovascular: Negative for chest pain and syncope.   Respiratory: Negative for cough and shortness of breath.    Endocrine: Negative for polydipsia, polyphagia and polyuria.   Hematologic/Lymphatic: Negative for bleeding problem. Does not bruise/bleed easily.   Skin: Negative for dry skin, itching and rash.   Musculoskeletal: Negative for falls and muscle weakness.   Gastrointestinal: Negative for abdominal pain and bowel incontinence.   Genitourinary: Negative for bladder incontinence and nocturia.   Neurological: Negative for disturbances in coordination, loss of balance and seizures.   Psychiatric/Behavioral: Negative for depression. The patient does not have insomnia.    Allergic/Immunologic: Negative for hives and persistent infections.     PHYSICAL EXAM:  GEN: A&Ox3, WD WN NAD  HEENT: WNL  CHEST: CTAB, no W/R/R  HEART: RRR, no M/R/G  ABD: Soft, NT ND, BS x4 QUADS  MS; See Epic  NEURO: CN II-XII intact       The surgical consent was then reviewed with the patient, who agreed with all the contents of the consent form and it was signed. she was then given the [facility] surgery packet to bring with her to [facility] for the anesthesia portion of her perioperative paperwork.   For all physicians except for Dr. Gama, we will email and possibly fax the consent forms and [facility]    The patient was given the opportunity to ask questions about the surgical plan and consent form, and once no other questions were asked, I proceeded with the pre-op appointment.    PHYSICAL THERAPY:  She was also instructed regarding physical therapy and will begin on  1-2 weeks post-op. She was given a copy of the original prescription to schedule. Another copy of this prescription was also faxed to [facility] where she will start and then transition to a PT place back home in [place].    POST OP CARE:instructions were reviewed  including care of the wound and dressing after surgery and when she can shower.     PAIN MANAGEMENT: Estela Vázquez was also given her pain management regime, which includes the TENS unit given to her by priti along with the education required for its use. She was also instructed regarding the Polar ice unit that will be in place after surgery and her postoperative pain medications.     PAIN MEDICATION:  Percocet 10/325mg 1 po q 4-6 hours prn pain  Ultram 50 mg Take 1-2 p.o. q.6 hours p.r.n. breakthrough pain,   Phenergan 25 mg one p.o. q.6 hours p.r.n. nausea and vomiting.    DVT prophylaxis was discussed with the patient today including risk factors for developing DVTs and history of DVTs. The patient was asked if any specific recommendations were given from the doctor/s that did pre-operative surgical clearance. The patient was then given an education sheet about DVTs and PE with warning signs and symptoms of both and steps to take if they suspect either of these.    This along with the Modified Caprini risk assessment model for VTE in general surgical patients was used to determine the patient's DVT risk.     From: Dacia MK, Abel DA, Megan SM, et al. Prevention of VTE in nonorthopedic surgical patients: antithrombotic therapy and prevention of thrombosis, 9th ed: American College of Chest Physicians evidence-based clinical practical guidelines. Chest 2012; 141:e227S. Copyright © 2012. Reproduced with permission from the American College of Chest Physicians.    The below listed DVT prophylaxis regimen along with bilateral CORY compression stockings will be used post-op. Length of treatment has been determined to be 10-42 days post-op by the above noted Caprini assessment model.     The patient was instructed to buy and take:  Aspirin 325mg BID x 6 weeks for DVT prophylaxis starting on the evening after surgery.  Patient will also use bilateral TEDs on lower extremities, SCDs during surgery, and early  ambulation post-op. If the patient was previously taking 81mg baby aspirin, they were told to not take it will using the above stated aspirin and to restart the 81mg aspirin after completion of the aspirin dose.     Patient was also told to buy over the counter Prilosec medication and take it once daily for GI protection as long as they are taking NSAIDs or Aspirin.     Published data in Ben STALLWORTH, et al. J Arthroplasty. Oct; 31(10):2237-40, 2016; showed that aspirin use as prophylaxis during revision total joint arthroplasty was more effective than warfarin in preventing symptomatic venous thromboembolic events and was associated with lower complications.  Patients in the study were also treated with intermittent pneumatic compression devices. Compression stockings would be our method of mechanical prophylaxis, which has been shown to be similar to pneumatic compression in the systematic review, All RJ, et al. Jaycee Surg. Feb; 239(2): 162-171, 2004.     Results showed a significantly higher incidence of symptomatic venous thromboembolic events among patients in the warfarin group vs. the aspirin group (1.75% vs. 0.56%). Researchers also noted a bleeding event rate of 1.5% among patients who received warfarin compared with a rate of 0.4% among patients who received aspirin.    Patient denies history of seizures.     The patient was told that narcotic pain medications may make them drowsy and instructions were given to not sign legal documents, drive or operate heavy machinery, cars, or equipment while under the influence of narcotic medications. The patient was told and understands that narcotic pain medications should only be used as needed to control pain and that other options of pain control include TENs unit and ice packs/unit.     As there were no other questions to be asked, she was given my business card along with Nika Barreto MD business card if she has any questions or concerns prior to surgery or in  the postop period.

## 2018-11-15 NOTE — OR NURSING
Updated pt again re: surgery time.  Denies complaints at this time.  Report given to Kiara Moseley RN.

## 2018-11-15 NOTE — DISCHARGE INSTRUCTIONS

## 2018-11-15 NOTE — TELEPHONE ENCOUNTER
Will put patient on Lovenox daily for 6 weeks following outpatient hip surgery due to her history of PE. Family was instructed by Dr. Barreto in post-op discussion to use Lovenox for DVT prophylaxis in place of aspirin. Do not take aspirin with lovenox.

## 2018-11-15 NOTE — INTERVAL H&P NOTE
The patient has been examined and the H&P has been reviewed:    I concur with the findings and no changes have occurred since H&P was written.    Anesthesia/Surgery risks, benefits and alternative options discussed and understood by patient/family.          Active Hospital Problems    Diagnosis  POA    Trochanteric bursitis of left hip [M70.62]  Yes      Resolved Hospital Problems   No resolved problems to display.

## 2018-11-15 NOTE — TRANSFER OF CARE
"Anesthesia Transfer of Care Note    Patient: Estela Vázquez    Procedure(s) Performed: Procedure(s) (LRB):  REPAIR, Trochanteric Bursectomy (Left)    Patient location: PACU    Anesthesia Type: general    Transport from OR: Transported from OR on 2-3 L/min O2 by NC with adequate spontaneous ventilation    Post pain: adequate analgesia    Post assessment: no apparent anesthetic complications    Post vital signs: stable    Level of consciousness: awake    Nausea/Vomiting: no nausea/vomiting    Complications: none    Transfer of care protocol was followed      Last vitals:   Visit Vitals  /66 (BP Location: Right arm, Patient Position: Lying)   Pulse (!) 54   Temp 37 °C (98.6 °F) (Oral)   Resp 18   Ht 5' 7" (1.702 m)   Wt 81.6 kg (179 lb 16 oz)   SpO2 98%   Breastfeeding? No   BMI 28.19 kg/m²     "

## 2018-11-15 NOTE — ANESTHESIA POSTPROCEDURE EVALUATION
"Anesthesia Post Evaluation    Patient: Estela Vázquez    Procedure(s) Performed: Procedure(s) (LRB):  REPAIR, Trochanteric Bursectomy (Left)    Final Anesthesia Type: general  Patient location during evaluation: PACU  Patient participation: Yes- Able to Participate  Level of consciousness: awake and alert  Post-procedure vital signs: reviewed and stable  Pain management: adequate  Airway patency: patent  PONV status at discharge: No PONV  Anesthetic complications: no      Cardiovascular status: blood pressure returned to baseline  Respiratory status: unassisted and room air  Hydration status: euvolemic  Follow-up not needed.        Visit Vitals  /67   Pulse 69   Temp 36.9 °C (98.4 °F) (Oral)   Resp 18   Ht 5' 7" (1.702 m)   Wt 81.6 kg (179 lb 16 oz)   SpO2 100%   Breastfeeding? No   BMI 28.19 kg/m²       Pain/Nik Score: Pain Assessment Performed: Yes (11/15/2018 10:58 AM)  Presence of Pain: complains of pain/discomfort (11/15/2018  4:45 PM)  Pain Rating Prior to Med Admin: 2 (11/15/2018  4:45 PM)  Nik Score: 9 (11/15/2018  4:45 PM)        "

## 2018-11-16 ENCOUNTER — TELEPHONE (OUTPATIENT)
Dept: SPORTS MEDICINE | Facility: CLINIC | Age: 53
End: 2018-11-16

## 2018-11-16 NOTE — PLAN OF CARE
Estela Rosales Vázquez has met all discharge criteria from Phase II. Vital Signs are stable, ambulating  without difficulty. Discharge instructions given, patient verbalized understanding. Discharged from facility via wheelchair in stable condition.

## 2018-11-16 NOTE — TELEPHONE ENCOUNTER
Called and spoke to patient who is doing well following surgery. Her pain is well controlled. Called to tell her to start lovenox today as discussed by Dr. Barreto following surgery. Told patient that she will need lovenox for 4-6 weeks following surgery. Possibly on 4 weeks if she is up walking normal with no issues at 4 weeks.   She was told to not take aspirin with lovenox. Do not use NSAIDs while taking lovenox.    She expressed understanding.

## 2018-11-16 NOTE — TELEPHONE ENCOUNTER
Cancelled lovenox at [facility] and will call it into [facility] pharmacy for patient.

## 2018-11-18 ENCOUNTER — TELEPHONE (OUTPATIENT)
Dept: ORTHOPEDICS | Facility: HOSPITAL | Age: 53
End: 2018-11-18

## 2018-11-18 NOTE — TELEPHONE ENCOUNTER
Patient is s/p bursectomy [facility] Dr. Barreto 11/15.   Yesterday, patient started coughing up thick mucus and today again with slight tinge of blood. She has history of DVT and is on lovenox 40 mg which she's been taking. She has no leg swelling or erythema, SOB or difficulty breathing. Patient sounds like form coughing forcefully that she has slight mucosal tears leading to slight amoutn of blood in sputum. Encouraged her to continue to monitor for leg swelling and larger volume of blood coughed up. All questions were answered to patient's satisfaction.

## 2018-11-21 NOTE — OP NOTE
DATE OF PROCEDURE: 11/15/2018    SURGEON:  Nika Barreto M.D.    ASSISTANT: SMA Tramaine      PREOPERATIVE DIAGNOSIS:    Left:  1. Hip recalcitrant trochanteric bursitis   2. Gluteal sling tightness    POSTOPERATIVE DIAGNOSIS:    Left:  1. Hip recalcitrant trochanteric bursitis   2. Gluteal sling tightness    PROCEDURE:    Left:  1. Hip arthroscopic trochanteric bursectomy   2. Hip gluteal sling release (CPT 20599)    ANESTHESIA:  General with local block 0.5% Ropivicaine, Duramorph, Toradol.    BLOOD LOSS:  Minimal.    DRAINS:  None.    COMPLICATIONS:  None.    TOURNIQUET TIME:  None.    DISPOSITION:  The patient was moved to the recovery room in stable condition, with extremity and abdominal compartments soft and capillary refill less than a second in all digits.    BRIEF INDICATION OF MEDICAL NECESSITY:  The patient is a 53 y.o. year-old female  who was seen in the office with a history with a history and physical examination findings consistent with the above.  Details of non-operative versus operative options were discussed.  The risks and benefits were discussed with the patient.  The patient acknowledged understanding and wished to proceed with an operative intervention.  Informed consent was obtained prior to the procedure.  Details of the procedure were explained including probable incisions and probable rehabilitation course.  The patient understands the likely length of convalescence after surgery; and the risks, benefits and alternatives of the surgery were explained.  Reasonable expectations and potential complications were discussed and acknowledged including, but not limited to: infection, bleeding, blood clots (DVT and/or PE), nerve injury, re-tear, instability, fracture, continued pain, loss of function, stiffness, arthritis, need for further surgery, skin breakdown, pudendal nerve palsy.  It was explained there was a chance of failure which may require further management.  The patient agreed  and understood and wished to proceed.    PROCEDURE IN DETAIL: The operative site was marked by the operative surgeon. The patient was brought into the operating room.  Pre-operative antibiotics were administered.  General anesthesia was administered by the anesthesia team. The patient was then carefully moved to the lateral position with bean-bag. All pressure points were carefully padded and checked. The upper extremities were placed in comfortable positions and carefully checked. Trochanteric bursa was insufflated with 30cc normal saline with 18-gauge spinal needle. Standard superior and inferior portals were created in the standard fashion.     Trochanteric bursa was visualized and cleared of soft tissue in a minimal fashion with shaver and thermal device, enough for visualization of the IT band.     Ellipsoid area was excised using thermal device. No area of impingement and snapping was noted with IR and ER of the hip, including posteriorly. Gluteal sling was dissected and divided as well.  Gluteus medius and hip abductors were preserved.  Care was taken to protect sciatic nerve and neurovascular structures during the procedure.    Hip gluteal sling release was performed in the standard fashion with thermal device, care was taken to protect neurovascular structures including but not limited to sciatic nerve.    The hip was then copiously irrigated and the fluid was extravasated using suction. 0.5% Ropivicaine, Duramorph, Toradol, 30 cc was instilled into and around about the portals. The portal sites were closed using 3-0 nylon suture in an interrupted fashion. The incisions were dressed with Xeroform, 4 x 4's, abd pads and MediPore tape. TENS unit pads were placed which were medically necessary for pain relief. An iceman and hip brace were secured.     The patient was then extubated, moved to the recovery room where the patient arrived in stable condition with extremity and abdominal compartments soft and  capillary refill less than a second in all digits.     POSTOPERATIVE PLAN: We will follow the hip arthroscopic trochanteric bursectomy guidelines. The patient will be weight-bearing as tolerated.

## 2018-11-26 ENCOUNTER — CLINICAL SUPPORT (OUTPATIENT)
Dept: REHABILITATION | Facility: HOSPITAL | Age: 53
End: 2018-11-26
Payer: COMMERCIAL

## 2018-11-26 DIAGNOSIS — R26.9 GAIT DIFFICULTY: ICD-10-CM

## 2018-11-26 DIAGNOSIS — M25.552 ACUTE HIP PAIN, LEFT: ICD-10-CM

## 2018-11-26 DIAGNOSIS — M62.81 MUSCLE WEAKNESS OF LOWER EXTREMITY: ICD-10-CM

## 2018-11-26 PROCEDURE — 97161 PT EVAL LOW COMPLEX 20 MIN: CPT | Performed by: PHYSICAL THERAPIST

## 2018-11-26 PROCEDURE — 97110 THERAPEUTIC EXERCISES: CPT | Performed by: PHYSICAL THERAPIST

## 2018-11-26 NOTE — PLAN OF CARE
[facility]  Physical Therapy Initial Evaluation    Name: Estela Vázquez  Clinic Number: [id number]    Therapy Diagnosis:   Encounter Diagnoses   Name Primary?    Acute hip pain, left     Muscle weakness of lower extremity     Gait difficulty      Physician: Jad Lynn III, *  Dr. Barreto     PROCEDURE:    Left:  1. Hip arthroscopic trochanteric bursectomy   2. Hip gluteal sling release (CPT 29624)    DATE OF PROCEDURE: 11/15/2018         Physician Orders: PT Eval and Treat see precautions below   Medical Diagnosis: M70.62 (ICD-10-CM) - Trochanteric bursitis of left hip  Evaluation Date: 2018  Authorization Period Expiration: 2018  Plan of Care Certification Period:  2019  Visit # / Visits authorized:      Time In:  11:30  Time Out: 12:30  Total Billable Time: 60 minutes    Precautions: Standard  + POSTOPERATIVE PLAN: We will follow the hip arthroscopic trochanteric bursectomy guidelines. The patient will be weight-bearing as tolerated.          Subjective   Date of onset: 3/2018  History of current condition - Estela reports hiking in [place],  And following a hike, developed pain in her L hip that never resolved despite injections and PT, MRI (+) and led to above procedure     Past Medical History:   Diagnosis Date    Arrhythmia     bigeminy & trigeminy     followed cardiologist    Pulmonary embolism     Bilateral     Estela Vázquez  has a past surgical history that includes Shoulder arthroscopy; Hysterectomy (Bilateral); Laparoscopy w/ mini-laparotomy (Right);  section (N/A); Appendectomy; and REPAIR, Trochanteric Bursectomy (Left, 11/15/2018).    Estela has a current medication list which includes the following prescription(s): enoxaparin, hydrocodone-acetaminophen, losartan, omeprazole, promethazine, and tramadol.    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   Prior Level of Function: I   Current Level of Function: difficulty with household/community distance ambulation  Level and unlevel surfaces , prolonged standing and hiking (recreation/leisure)    Pts goals: "make my hip strong enough"     Objective     Observation: healing incision covered with dressing, enters PT WBAT without assistive device, gait deviation, min quad / glut atrophy on L     Range of Motion (Passive):   Hip Right Left   Flexion WFLs  WFLs   Extension WFLs  WFLs    Abduction WFLs  WFLs    Adduction WFLs  WFLs    External Rotation 35 38   Internal Rotation 32 34     Strength: Hip   Right Left Comment   Flexion 5/5 4/5    Extension 5/5 4-/5    Abduction: 5/5 3-/5    Adduction 5/5    NT       External Rotation 5/5    4-/5       Internal Rotation 5/5    4-/5         Special Tests:   NT secondary to surgery  Palpation:  (+) TTP greater trochanter region and ITB      TREATMENT   Treatment Time In: 12:00  Treatment Time Out: 12:30  Total Treatment time separate from Evaluation time:30'     Estela received therapeutic exercises to develop strength, endurance, ROM, flexibility and core stabilization for 30 minutes including:    Stick upper / lower leg   Bike x 6' 3.5 seat at 8  HSS 5x:15   Supine RF stretch 5x:15  Butt winking 1x15:05  B bridge 2x10:05  Quadruped cat/cow 1x15  Quad TA 1x10:10     CP x 10'       Education     Home Exercises and Patient Education Provided    Education provided re:   - progress towards goals   - role of therapy in multi - disciplinary team, goals for therapy  No spiritual or educational barriers to learning provided    Written Home Exercises Provided:  .  Exercises were reviewed and Estela was able to demonstrate " them prior to the end of the session.   Pt received a written copy of exercises to perform at home. Estela demonstrated good  understanding of the education provided.     Assessment   Estela is a 53 y.o. female referred to outpatient Physical Therapy with a medical diagnosis of s/p L hip trochanteric bursectomy. Pt presents with     Pain  Stiffness  Decreased flexibility  Decreased ROM  Decreased strength  Gait difficulty  Decreased functional status     Pt prognosis is Good.   Pt will benefit from skilled outpatient Physical Therapy to address the deficits stated above and in the chart below, provide pt/family education, and to maximize pt's level of independence.     Plan of care discussed with patient: Yes  Pt's spiritual, cultural and educational needs considered and pt agreeable to plan of care and goals as stated below:     Anticipated Barriers for therapy: none    Medical Necessity is demonstrated by the following  History  Co-morbidities and personal factors that may impact the plan of care Co-morbidities:   none    Personal Factors:   no deficits     low   Examination  Body Structures and Functions, activity limitations and participation restrictions that may impact the plan of care Body Regions:   lower extremities    Body Systems:    ROM  strength  balance  gait  transfers  motor control  scar formation  skin integrity    Participation Restrictions:   ADLs  Recreation/leisure    Activity limitations:   Mobility  walking    Self care  washing oneself (bathing, drying, washing hands)  dressing    Domestic Life  shopping  cooking  doing house work (cleaning house, washing dishes, laundry)    Community and Social Life  recreation and leisure         moderate   Clinical Presentation stable and uncomplicated low   Decision Making/ Complexity Score: low     Goals     ST. I in HEP    No short / long term goals will be set pending return to PT     Plan   Certification Period: 2018 to 2019    Pt to  move back to [place] for the winter, will undergo PT there and will follow up upon her return to [facility]    Outpatient Physical Therapy 1 times weekly for 3 weeks to include the following interventions: patient education, therex, modalities as needed .     Gumaro Wolff, PT

## 2018-11-27 ENCOUNTER — TELEPHONE (OUTPATIENT)
Dept: SPORTS MEDICINE | Facility: CLINIC | Age: 53
End: 2018-11-27

## 2018-11-28 ENCOUNTER — OFFICE VISIT (OUTPATIENT)
Dept: SPORTS MEDICINE | Facility: CLINIC | Age: 53
End: 2018-11-28
Payer: COMMERCIAL

## 2018-11-28 VITALS — HEIGHT: 67 IN | WEIGHT: 182 LBS | BODY MASS INDEX: 28.56 KG/M2

## 2018-11-28 DIAGNOSIS — M70.62 TROCHANTERIC BURSITIS OF LEFT HIP: Primary | ICD-10-CM

## 2018-11-28 PROCEDURE — 99024 POSTOP FOLLOW-UP VISIT: CPT | Mod: S$GLB,,, | Performed by: PHYSICIAN ASSISTANT

## 2018-11-28 PROCEDURE — 99999 PR PBB SHADOW E&M-EST. PATIENT-LVL III: CPT | Mod: PBBFAC,,, | Performed by: PHYSICIAN ASSISTANT

## 2018-11-28 NOTE — PROGRESS NOTES
HISTORY OF PRESENT ILLNESS:   Pt is here today for first post-operative followup of her hip bursa arthroscopy.  she is doing well.  We have reviewed her findings and discussed plan of care and future treatment options.      Working with Gumaro at [facility].  Minimal Pain. Back to full activities  Taking Lovenox as prescribed.    DATE OF PROCEDURE: 11/15/2018    PROCEDURE:    Left:  1. Hip arthroscopic trochanteric bursectomy   2. Hip gluteal sling release (CPT 92041)                                                                               PHYSICAL EXAMINATION:     Incision sites healed well  Sutures removed today  No evidence of any erythema, infection or induration  Flexion ROM 0-90 degrees   Minimal effusion  2+ DP pulse  No swelling, no calf tenderness  - Laura's sign                                                                               ASSESSMENT:                                                                                                                                               1. Status post above, doing well.                                                                                                                               PLAN:                                                                                                                                                     1. Continue with PT  2. Emphasized core function.  3. I have discussed return to activity in detail, activity as tolerated  4. she will see us back in 4 weeks with hip form.  5. Continue Lovenox a total of 4 weeks post op given her DVT/PE history    All questions were answered and she should contact us if she has any questions or concerns in the interim.

## 2018-12-07 ENCOUNTER — TELEPHONE (OUTPATIENT)
Dept: SPORTS MEDICINE | Facility: CLINIC | Age: 53
End: 2018-12-07

## 2018-12-07 NOTE — TELEPHONE ENCOUNTER
----- Message from Aba Sorto sent at 12/7/2018  9:31 AM CST -----  Contact: [facility]  office  fax  Need physical therapy orders and op report fax to office

## 2019-01-02 ENCOUNTER — OFFICE VISIT (OUTPATIENT)
Dept: SPORTS MEDICINE | Facility: CLINIC | Age: 54
End: 2019-01-02
Payer: COMMERCIAL

## 2019-01-02 DIAGNOSIS — M70.62 TROCHANTERIC BURSITIS OF LEFT HIP: ICD-10-CM

## 2019-01-02 DIAGNOSIS — Z98.890 HISTORY OF BURSECTOMY: Primary | ICD-10-CM

## 2019-01-02 PROCEDURE — 99024 POSTOP FOLLOW-UP VISIT: CPT | Mod: S$GLB,,, | Performed by: ORTHOPAEDIC SURGERY

## 2019-01-02 PROCEDURE — 99999 PR PBB SHADOW E&M-EST. PATIENT-LVL I: ICD-10-PCS | Mod: PBBFAC,,, | Performed by: ORTHOPAEDIC SURGERY

## 2019-01-02 PROCEDURE — 99024 PR POST-OP FOLLOW-UP VISIT: ICD-10-PCS | Mod: S$GLB,,, | Performed by: ORTHOPAEDIC SURGERY

## 2019-01-02 PROCEDURE — 99999 PR PBB SHADOW E&M-EST. PATIENT-LVL I: CPT | Mod: PBBFAC,,, | Performed by: ORTHOPAEDIC SURGERY

## 2019-01-02 NOTE — PROGRESS NOTES
HISTORY OF PRESENT ILLNESS:   Pt is here today for post-operative followup of her hip bursa arthroscopy.  she is doing well.  We have reviewed her findings and discussed plan of care and future treatment options.      She has been attending PT at the [facility] location, working with Gumaro  She notes doing her HEP on a regular basis     Patient notes that her original pain has improved but that her IT band it tight and painful, stretching helps but it has not resolved completely     DATE OF PROCEDURE: 11/15/2018  PROCEDURE:    Left:  1. Hip arthroscopic trochanteric bursectomy   2. Hip gluteal sling release (CPT 39005)                                                                               PHYSICAL EXAMINATION:     Incision sites healed well  No evidence of any erythema, infection or induration  Flexion ROM 0-90 degrees   No effusion  2+ DP pulse  No swelling, no calf tenderness  - Laura's sign                        Strength: 5-/5 left hip abduction, side lying                                                             ASSESSMENT:                                                                                                                                               1. Status post above, doing well.                                                                                                                               PLAN:                                                                                                                                                     1. Continue with PT  2. Emphasized core function.  3. I have discussed return to activity in detail, activity as tolerated, recommended swimming instead of walking for exercise.  4. she will see us back in 6 weeks with hip form.  5. All questions were answered and she should contact us if she has any questions or concerns in the interim.

## 2019-02-13 ENCOUNTER — OFFICE VISIT (OUTPATIENT)
Dept: SPORTS MEDICINE | Facility: CLINIC | Age: 54
End: 2019-02-13
Payer: COMMERCIAL

## 2019-02-13 VITALS
HEIGHT: 67 IN | HEART RATE: 57 BPM | BODY MASS INDEX: 28.56 KG/M2 | DIASTOLIC BLOOD PRESSURE: 79 MMHG | WEIGHT: 182 LBS | SYSTOLIC BLOOD PRESSURE: 123 MMHG

## 2019-02-13 DIAGNOSIS — M70.62 TROCHANTERIC BURSITIS OF LEFT HIP: Primary | ICD-10-CM

## 2019-02-13 PROCEDURE — 99024 POSTOP FOLLOW-UP VISIT: CPT | Mod: S$GLB,,, | Performed by: ORTHOPAEDIC SURGERY

## 2019-02-13 PROCEDURE — 99024 PR POST-OP FOLLOW-UP VISIT: ICD-10-PCS | Mod: S$GLB,,, | Performed by: ORTHOPAEDIC SURGERY

## 2019-02-13 PROCEDURE — 99999 PR PBB SHADOW E&M-EST. PATIENT-LVL III: CPT | Mod: PBBFAC,,, | Performed by: ORTHOPAEDIC SURGERY

## 2019-02-13 PROCEDURE — 99999 PR PBB SHADOW E&M-EST. PATIENT-LVL III: ICD-10-PCS | Mod: PBBFAC,,, | Performed by: ORTHOPAEDIC SURGERY

## 2019-02-13 NOTE — PROGRESS NOTES
HISTORY OF PRESENT ILLNESS:   Pt is here today for post-operative followup of her hip bursa arthroscopy.  she is doing well.  We have reviewed her findings and discussed plan of care and future treatment options.      She notes doing her HEP on a regular basis   She has been attending PT at [facility] physical therapy in [place]     Patient notes that some days are better than others   She notes pain laterally, but that it is different compared to before surgery   She describes her pain as tightness and stiffness     DATE OF PROCEDURE: 11/15/2018  PROCEDURE:    Left:  1. Hip arthroscopic trochanteric bursectomy   2. Hip gluteal sling release (CPT 07076)                                                                               PHYSICAL EXAMINATION:     Incision sites healed well  No evidence of any erythema, infection or induration  Flexion ROM 0-90 degrees   No effusion  2+ DP pulse  No swelling, no calf tenderness  - Laura's sign    + tenderness: piriformis                                                                                 ASSESSMENT:                                                                                                                                               1. Status post above, doing well.                                                                                                                               PLAN:                                                                                                                                                     1. Continue with PT to involve piriformis stretching and limited activity to allow piriformis to calm down   2. Emphasized core function.  3. I have discussed return to activity in detail, activity as tolerated, recommended swimming instead of walking for exercise.  4. she will see us back in 4 weeks with hip form.  5. Cortisone and anesthetic injection to the left piriformis with Dr. Villa Sánchez   6. All questions were  answered and she should contact us if she has any questions or concerns in the interim.

## 2019-02-15 ENCOUNTER — OFFICE VISIT (OUTPATIENT)
Dept: SPORTS MEDICINE | Facility: CLINIC | Age: 54
End: 2019-02-15
Payer: COMMERCIAL

## 2019-02-15 VITALS
SYSTOLIC BLOOD PRESSURE: 116 MMHG | DIASTOLIC BLOOD PRESSURE: 75 MMHG | HEIGHT: 67 IN | HEART RATE: 57 BPM | BODY MASS INDEX: 28.56 KG/M2 | WEIGHT: 182 LBS

## 2019-02-15 DIAGNOSIS — M25.552 LEFT HIP PAIN: Primary | ICD-10-CM

## 2019-02-15 DIAGNOSIS — M99.06 SOMATIC DYSFUNCTION OF LOWER EXTREMITY: ICD-10-CM

## 2019-02-15 DIAGNOSIS — M79.10 MYALGIA: ICD-10-CM

## 2019-02-15 DIAGNOSIS — M25.552 LATERAL PAIN OF LEFT HIP: Primary | ICD-10-CM

## 2019-02-15 DIAGNOSIS — M76.02 GLUTEAL TENDONITIS OF LEFT BUTTOCK: ICD-10-CM

## 2019-02-15 DIAGNOSIS — M99.05 SOMATIC DYSFUNCTION OF PELVIS REGION: ICD-10-CM

## 2019-02-15 DIAGNOSIS — M62.81 MUSCLE WEAKNESS: ICD-10-CM

## 2019-02-15 PROCEDURE — 99214 OFFICE O/P EST MOD 30 MIN: CPT | Mod: 25,S$GLB,, | Performed by: ORTHOPAEDIC SURGERY

## 2019-02-15 PROCEDURE — 99999 PR PBB SHADOW E&M-EST. PATIENT-LVL III: ICD-10-PCS | Mod: PBBFAC,,, | Performed by: ORTHOPAEDIC SURGERY

## 2019-02-15 PROCEDURE — 3008F BODY MASS INDEX DOCD: CPT | Mod: CPTII,S$GLB,, | Performed by: ORTHOPAEDIC SURGERY

## 2019-02-15 PROCEDURE — 98925 PR OSTEOPATHIC MANIP,1-2 BODY REGN: ICD-10-PCS | Mod: S$GLB,,, | Performed by: ORTHOPAEDIC SURGERY

## 2019-02-15 PROCEDURE — 99214 PR OFFICE/OUTPT VISIT, EST, LEVL IV, 30-39 MIN: ICD-10-PCS | Mod: 25,S$GLB,, | Performed by: ORTHOPAEDIC SURGERY

## 2019-02-15 PROCEDURE — 3008F PR BODY MASS INDEX (BMI) DOCUMENTED: ICD-10-PCS | Mod: CPTII,S$GLB,, | Performed by: ORTHOPAEDIC SURGERY

## 2019-02-15 PROCEDURE — 98925 OSTEOPATH MANJ 1-2 REGIONS: CPT | Mod: S$GLB,,, | Performed by: ORTHOPAEDIC SURGERY

## 2019-02-15 PROCEDURE — 99999 PR PBB SHADOW E&M-EST. PATIENT-LVL III: CPT | Mod: PBBFAC,,, | Performed by: ORTHOPAEDIC SURGERY

## 2019-02-15 RX ORDER — MELOXICAM 15 MG/1
15 TABLET ORAL DAILY
Qty: 30 TABLET | Refills: 0 | Status: SHIPPED | OUTPATIENT
Start: 2019-02-15 | End: 2019-03-08 | Stop reason: SDUPTHER

## 2019-02-15 NOTE — PROGRESS NOTES
"CC: left piriformis pain    53 y.o. Female presents today for follow up evaluation of her left piriformis pain.     Patient states her hip pain has been extreme for the past two months. She previously had a trochanteric bursectomy of her left hip with Dr. Barreto on 11/15/2018. She is here today for evaluation for an injection into her piriformis. She has been doing physical therapy and believes it is making her stronger, but has not seemed to be helping her pain. At physical therapy she does step ups, practices getting up out of a chair, clam shells, single leg bridges, and some manual therapy. Patient states she feels the best when walking but was advised not to do too much walking. Getting up out of a chair and taking her initial few steps causes her the most pain and weakness. She is unable to cross her legs due to pain. She does not have any discomfort with sitting for long periods of time or with long car rides. She reports a "little hot dog" like lumps in her upper left hip area that causes her the most pain. This pain diffuses through her lateral upper leg. When asked where she hurts specifically, she points just distal to her left greater trochanter along her IT band.  When asked if she is experiencing buttock pain, she denies any specific symptoms in this area.    She does state that she had an MRI done prior to surgery that showed inflammation around the area of her trochanteric bursa as well as tearing of two of her gluteus muscles.  She does not have the disc or report with her at this time.     REVIEW OF SYSTEMS:   Constitution: Patient denies fever or chills.  Eyes: Patient denies eye pain or vision changes.  CVS: Patient denies chest pain.  Lungs: Patient denies shortness of breath or cough.  Skin: Patient denies skin rash or itching.    Musculoskeletal: Patient denies recent falls. See HPI.  Psych: Patient denies any current anxiety or nervousness.    PAST MEDICAL HISTORY:   Past Medical History: " "  Diagnosis Date    Arrhythmia     bigeminy & trigeminy     followed cardiologist    Pulmonary embolism     Bilateral       MEDICATIONS:     Current Outpatient Medications:     HYDROcodone-acetaminophen (NORCO)  mg per tablet, Take 1 tablet by mouth every 4-6 hours for pain., Disp: 28 tablet, Rfl: 0    losartan (COZAAR) 25 MG tablet, Take 25 mg by mouth every evening. , Disp: , Rfl:     promethazine (PHENERGAN) 25 MG tablet, Take 1 tablet (25 mg total) by mouth every 6 (six) hours as needed for Nausea., Disp: 16 tablet, Rfl: 0    traMADol (ULTRAM) 50 mg tablet, Take 1-2 tablets ( mg total) by mouth every 6 (six) hours as needed (surgical pain)., Disp: 28 tablet, Rfl: 0    meloxicam (MOBIC) 15 MG tablet, Take 1 tablet (15 mg total) by mouth once daily., Disp: 30 tablet, Rfl: 0    omeprazole (PRILOSEC) 20 MG capsule, Take 1 capsule (20 mg total) by mouth once daily. To protect your stomach., Disp: 25 capsule, Rfl: 1    ALLERGIES:   Review of patient's allergies indicates:   Allergen Reactions    Morphine Itching    Penicillins Rash        PHYSICAL EXAMINATION:  /75   Pulse (!) 57   Ht 5' 7" (1.702 m)   Wt 82.6 kg (182 lb)   BMI 28.51 kg/m²   Vitals signs and nursing note have been reviewed.  General: In no acute distress, well developed, well nourished, no diaphoresis  Eyes: EOM full and smooth, no eye redness or discharge  HENT: normocephalic and atraumatic, neck supple, trachea midline, no nasal discharge  Cardiovascular: no LE edema  Lungs: respirations non-labored, no conversational dyspnea   Neuro: AAOx3, CN2-12 grossly intact  Skin: No rashes, warm and dry  Psychiatric: cooperative, pleasant, mood and affect appropriate for age    HIP: left   The affected hip is compared to the contralateral hip.    Observation:    There is no edema, erythema, or ecchymosis in the lumbosacral region.   There is no Trendelenburg sign on either side  Left hemipelvis higher than right when standing.  "   No thoracolumbar scoliosis observed.    No midline skin abnormalities.  No atrophy noted in the lower limbs.    ROM:   Passive hip flexion to 120° bilaterally.    Passive hip internal rotation to 45° bilaterally.    Passive hip external rotation to 45° bilaterally.    Passive hip abduction to 45° bilaterally.    All motions above are without pain.    Tenderness To Palpation:  No tenderness at the ASIS, AIIS, PSIS, PIIS, iliac crest, pubic bones, ischial tuberosity.  No tenderness throughout the lumbar spine, iliolumbar region, or posterior pelvis.  No tenderness throughout the sacrum or piriformis  Moderate tenderness at the glut attachments on inferior aspect of the greater trochanter  Moderate to severe tenderness over proximal IT band just distal to greater trochanter    Strength Testing:  Hip flexion - 5/5  Hip extension - 5/5  Hip adduction - 5/5  Hip abduction - 5/5  Knee flexion - 5/5  Knee extension - 5/5    Special Tests:  Standing Trendelenburg test - negative    Seated straight leg raise - negative  Supine straight leg raise - negative  Hamstring flexibility symmetric, but tight bilaterally    Log roll - negative  THOMAS - negative for anterior hip pain, pain felt just distal to greater trochanter  FADIR - negative for anterior hip pain, pain felt just distal to greater trochanter  Scour test - negative    ASIS compression test - negative  SI drawer test - negative   Thigh thrust test - negative     Piriformis test (Bonnet's) - negative  Quadriceps flexibility symmetric.  Carlos test - positive for pain    Standing flexion test positive on left  Leg lengths show a short left leg, corrected after OMT.     Posture:  Upright and Increased thoracic kyphosis  Gait: Non-antalgic and Left antalgic with Neutral ankle mechanics and Neutral medial arch    TART (Tissue texture abnormality, Asymmetry,  Restriction of motion and/or Tenderness) changes:    Lower extremity:  TTA left proximal IT band  Multiple, very  tender fascial herniated trigger points just distal to the glute muscle attachment on the greater trochanter.  Fascial trigger band from mid IT band to distal 1/3 of anterior thigh    Pelvis:  · Innominate:Left posterior rotation  · Pubic bone:Right superior pubic shear    Sacrum:Neutral    Key   F= Flexed   E = Extended   R = Rotated   S = Sidebent   TTA = tissue texture abnormality     Neurovascular Exam:  Normal gait without Trendelenburg.  2+ femoral, DP, and PT pulses BL.  No skin changes, no abnormal hair distribution.  Sensation intact to light touch throughout the obturator and medial/lateral/posterior femoral cutaneous nerves.  Capillary refill intact to <2 seconds in all lower limb digits.      ASSESSMENT:      ICD-10-CM ICD-9-CM   1. Lateral pain of left hip M25.552 719.45   2. Gluteal tendonitis of left buttock M76.02 726.5   3. Muscle weakness - core M62.81 728.87   4. Myalgia M79.10 729.1   5. Somatic dysfunction of lower extremity M99.06 739.6   6. Somatic dysfunction of pelvis region M99.05 739.5         PLAN:  1-4.  Lateral pain of left hip/gluteal tendinitis/core muscle weakness/myalgia -     Estela is here today for an evaluation for a piriformis injection under ultrasound guidance.  She is status post left trochanteric bursa resection surgery done in November of 2018.  She states that she got good improvement of her pain for several weeks, but has now noticed pain returning and seems to be in a slightly different location. On the examination before the procedure, it was noted that she was extremely tender over her proximal 3rd of the IT band and was point tender over several fascial herniated trigger points along the IT band.  When examining the posterior hip musculature today, there was not any appreciable palpable tenderness over the piriformis muscle or other external rotators and there was no pain with muscle activation and resistance testing.    I discussed the 2 options at this time. I  advised that we could proceed with the ultrasound-guided piriformis injection, but I am not sure at this time if she will get great pain relief as she was not especially painful in that area and piriformis muscle testing did not elicit pain either. She had seen an osteopathic physician when she was younger so is familiar with OMT, and I discussed a trial of OMT to the affected regions to see if that could offer her some pain relief.  She is traveling to [place] for the next 3 weeks, but will be back in early March for her child's wedding and would like to be able to walk without pain and potentially dance at the event. At this time, she would like to try OMT and if not better we will likely proceed with the ultrasound-guided piriformis injection upon her return to clinic on 03/08/2019.  See below for further details.    We discussed her exercises that she was doing from physical therapy and it was noted that she is not perform any glute or pelvic stabilizing activities.  I believe incorporating these into her plan will give her better results and will improve her pain. She is going to physical therapy in [place] and gave me the phone number to her physical therapist, Truman, and I will contact him to discuss additional exercises to add and other exercises to avoid.    She denies taking an anti-inflammatory at this time, so I recommend Mobic 15 mg tablets daily for at least 2 weeks consistently to establish a good anti-inflammatory effect.    She seems pleased with today's plan as she was very nervous for the piriformis injection and states that similar injections done in the past have not helped her.      5-6.  Somatic dysfunction of lower extremity, pelvis regions -     OMT 1-2 regions. Oral consent obtained. Reviewed benefits and potential side effects. OMT indicated today due to signs and symptoms as well as local and remote somatic dysfunction findings and their related neurokinetic, lymphatic, fascial and/or  arteriovenous body connections. OMT techniques used: Soft Tissue, Myofascial Release, Muscle Energy and Fascial Distortion Model. Treatment was tolerated well. Improvement noted in segmental mobility post-treatment in dysfunctional regions. There were no adverse events and no complications immediately following treatment. Advised plenty of water to help alleviate soreness.    Of note, after performing FDM to the fascial herniated trigger points, she admitted to decreased pain over her previously tender area and had decreased pain with ambulation.  Continuing to address the somatic dysfunctions may offer her the most effective pain relief.      Future planning includes - possible piriformis injection under ultrasound guidance if not better.  If improved with OMT, will likely continue OMT and optimize home exercise program to focus on glute musculature strengthening and pelvic stabilization    All questions were answered to the best of my ability and all concerns were addressed at this time.    Follow up in 3 weeks for above, or sooner if needed.

## 2019-02-18 ENCOUNTER — TELEPHONE (OUTPATIENT)
Dept: ORTHOPEDICS | Facility: CLINIC | Age: 54
End: 2019-02-18

## 2019-02-18 NOTE — TELEPHONE ENCOUNTER
I discussed exercises with her PT, Truman, today over the telephone.  He states that they have been doing some glute retraining exercises and I advised for him to continue these.  In addition, I would like them to initiate more core stabilizing and strengthening exercises as this is not currently a focal point of her treatment plan. I advised for pelvic clock exercises specifically to help maintain core and pelvic stability. I also advised that I performed OMT on her with good result at her last visit.  Home to was mostly directed at her pelvis and IT band and he states that they will focus more on her IT band as well, potentially with [facility] therapy.  Truman was on board with this plan.

## 2019-03-08 ENCOUNTER — OFFICE VISIT (OUTPATIENT)
Dept: SPORTS MEDICINE | Facility: CLINIC | Age: 54
End: 2019-03-08
Payer: COMMERCIAL

## 2019-03-08 VITALS
HEART RATE: 62 BPM | BODY MASS INDEX: 28.56 KG/M2 | SYSTOLIC BLOOD PRESSURE: 112 MMHG | HEIGHT: 67 IN | DIASTOLIC BLOOD PRESSURE: 70 MMHG | WEIGHT: 182 LBS

## 2019-03-08 DIAGNOSIS — M79.10 MYALGIA: ICD-10-CM

## 2019-03-08 DIAGNOSIS — M99.04 SOMATIC DYSFUNCTION OF SACRAL REGION: ICD-10-CM

## 2019-03-08 DIAGNOSIS — M76.02 GLUTEAL TENDONITIS OF LEFT BUTTOCK: ICD-10-CM

## 2019-03-08 DIAGNOSIS — M99.05 SOMATIC DYSFUNCTION OF PELVIS REGION: ICD-10-CM

## 2019-03-08 DIAGNOSIS — M99.03 SOMATIC DYSFUNCTION OF LUMBAR REGION: ICD-10-CM

## 2019-03-08 DIAGNOSIS — M62.81 MUSCLE WEAKNESS: ICD-10-CM

## 2019-03-08 DIAGNOSIS — M25.552 LATERAL PAIN OF LEFT HIP: Primary | ICD-10-CM

## 2019-03-08 DIAGNOSIS — M99.06 SOMATIC DYSFUNCTION OF LOWER EXTREMITY: ICD-10-CM

## 2019-03-08 PROCEDURE — 3008F PR BODY MASS INDEX (BMI) DOCUMENTED: ICD-10-PCS | Mod: CPTII,S$GLB,, | Performed by: ORTHOPAEDIC SURGERY

## 2019-03-08 PROCEDURE — 98926 OSTEOPATH MANJ 3-4 REGIONS: CPT | Mod: S$GLB,,, | Performed by: ORTHOPAEDIC SURGERY

## 2019-03-08 PROCEDURE — 3008F BODY MASS INDEX DOCD: CPT | Mod: CPTII,S$GLB,, | Performed by: ORTHOPAEDIC SURGERY

## 2019-03-08 PROCEDURE — 98926 PR OSTEOPATHIC MANIP,3-4 BODY REGN: ICD-10-PCS | Mod: S$GLB,,, | Performed by: ORTHOPAEDIC SURGERY

## 2019-03-08 PROCEDURE — 99213 OFFICE O/P EST LOW 20 MIN: CPT | Mod: 25,S$GLB,, | Performed by: ORTHOPAEDIC SURGERY

## 2019-03-08 PROCEDURE — 99999 PR PBB SHADOW E&M-EST. PATIENT-LVL III: ICD-10-PCS | Mod: PBBFAC,,, | Performed by: ORTHOPAEDIC SURGERY

## 2019-03-08 PROCEDURE — 99213 PR OFFICE/OUTPT VISIT, EST, LEVL III, 20-29 MIN: ICD-10-PCS | Mod: 25,S$GLB,, | Performed by: ORTHOPAEDIC SURGERY

## 2019-03-08 PROCEDURE — 99999 PR PBB SHADOW E&M-EST. PATIENT-LVL III: CPT | Mod: PBBFAC,,, | Performed by: ORTHOPAEDIC SURGERY

## 2019-03-08 RX ORDER — MELOXICAM 15 MG/1
15 TABLET ORAL DAILY
Qty: 30 TABLET | Refills: 0 | Status: SHIPPED | OUTPATIENT
Start: 2019-03-08 | End: 2020-02-19 | Stop reason: SDUPTHER

## 2019-03-08 NOTE — PROGRESS NOTES
"CC: left lateral hip pain     Miss Vázquez is here today for follow up evaluation of her left lateral hip pain. Patient reports she is still having good pain relief from OMT at her last visit.  She states that the treatment directed at her IT band was incredibly helpful in decreasing her pain. Patient reports she recently declared her last day with PT last week as she states that they never gave her any information about the exercise modifications that Dr. Sánchez spoke with them about. She is doing her HEP given to her by Dr. Sánchez as well as some of her PT exercises daily and she is finding symptom improvement by doing them. She reports she does feel some soreness in her gluteal area which she attributes to working out and believes she is getting stronger. Patient has been taking Mobic 15mg and states it has helped her pain a lot as well. She is fearful to stop taking it as she does not want her paint to come back. Patient states overall she feels about 75% better than at her last visit.  Her daughter's wedding is in 1 week and she is feeling as though she will be able to walk and dance during the event, which she is very pleased about.      Recall from visit on 2/18/2019  53 y.o. Female presents today for follow up evaluation of her "left piriformis pain".     Patient states her hip pain has been extreme for the past two months. She previously had a trochanteric bursectomy of her left hip with Dr. Barreto on 11/15/2018. She is here today for evaluation for an injection into her piriformis. She has been doing physical therapy and believes it is making her stronger, but has not seemed to be helping her pain. At physical therapy she does step ups, practices getting up out of a chair, clam shells, single leg bridges, and some manual therapy. Patient states she feels the best when walking but was advised not to do too much walking. Getting up out of a chair and taking her initial few steps causes her the most pain and " "weakness. She is unable to cross her legs due to pain. She does not have any discomfort with sitting for long periods of time or with long car rides. She reports a "little hot dog" like lumps in her upper left hip area that causes her the most pain. This pain diffuses through her lateral upper leg. When asked where she hurts specifically, she points just distal to her left greater trochanter along her IT band.  When asked if she is experiencing buttock pain, she denies any specific symptoms in this area.    She does state that she had an MRI done prior to surgery that showed inflammation around the area of her trochanteric bursa as well as tearing of two of her gluteus muscles.  She does not have the disc or report with her at this time.     REVIEW OF SYSTEMS:   Constitution: Patient denies fever or chills.  Eyes: Patient denies eye pain or vision changes.  CVS: Patient denies chest pain.  Lungs: Patient denies shortness of breath or cough.  Skin: Patient denies skin rash or itching.    Musculoskeletal: Patient denies recent falls. See HPI.  Psych: Patient denies any current anxiety or nervousness.    PAST MEDICAL HISTORY:   Past Medical History:   Diagnosis Date    Arrhythmia     bigeminy & trigeminy     followed cardiologist    Pulmonary embolism     Bilateral       MEDICATIONS:     Current Outpatient Medications:     HYDROcodone-acetaminophen (NORCO)  mg per tablet, Take 1 tablet by mouth every 4-6 hours for pain., Disp: 28 tablet, Rfl: 0    losartan (COZAAR) 25 MG tablet, Take 25 mg by mouth every evening. , Disp: , Rfl:     meloxicam (MOBIC) 15 MG tablet, Take 1 tablet (15 mg total) by mouth once daily., Disp: 30 tablet, Rfl: 0    omeprazole (PRILOSEC) 20 MG capsule, Take 1 capsule (20 mg total) by mouth once daily. To protect your stomach., Disp: 25 capsule, Rfl: 1    promethazine (PHENERGAN) 25 MG tablet, Take 1 tablet (25 mg total) by mouth every 6 (six) hours as needed for Nausea., Disp: 16 " "tablet, Rfl: 0    traMADol (ULTRAM) 50 mg tablet, Take 1-2 tablets ( mg total) by mouth every 6 (six) hours as needed (surgical pain)., Disp: 28 tablet, Rfl: 0    ALLERGIES:   Review of patient's allergies indicates:   Allergen Reactions    Morphine Itching    Penicillins Rash        PHYSICAL EXAMINATION:  /70   Pulse 62   Ht 5' 7" (1.702 m)   Wt 82.6 kg (182 lb)   BMI 28.51 kg/m²   Vitals signs and nursing note have been reviewed.  General: In no acute distress, well developed, well nourished, no diaphoresis  Eyes: EOM full and smooth, no eye redness or discharge  HENT: normocephalic and atraumatic, neck supple, trachea midline, no nasal discharge  Cardiovascular: no LE edema  Lungs: respirations non-labored, no conversational dyspnea   Neuro: AAOx3, CN2-12 grossly intact  Skin: No rashes, warm and dry  Psychiatric: cooperative, pleasant, mood and affect appropriate for age    HIP: left   The affected hip is compared to the contralateral hip.    Observation:    There is no edema, erythema, or ecchymosis in the lumbosacral region.   There is no Trendelenburg sign on either side  Left hemipelvis higher than right when standing.    No thoracolumbar scoliosis observed.    No midline skin abnormalities.  No atrophy noted in the lower limbs.    ROM:   Passive hip flexion to 120° bilaterally.    Passive hip internal rotation to 45° bilaterally.    Passive hip external rotation to 45° bilaterally.    Passive hip abduction to 45° bilaterally.    All motions above are without pain.    Tenderness To Palpation:  No tenderness at the ASIS, AIIS, PSIS, PIIS, iliac crest, pubic bones, ischial tuberosity.  No tenderness throughout the lumbar spine, iliolumbar region, or posterior pelvis.  No tenderness throughout the sacrum or piriformis  Minimal tenderness at the glut attachments on inferior aspect of the greater trochanter  Mild tenderness over proximal IT band just distal to greater trochanter, much improved " compared to last visit    Strength Testing:  Hip flexion - 5/5  Hip extension - 5/5  Hip adduction - 5/5  Hip abduction - 5/5  Knee flexion - 5/5  Knee extension - 5/5    Special Tests:  Standing Trendelenburg test - negative    Seated straight leg raise - negative  Supine straight leg raise - negative  Hamstring flexibility symmetric, but tight bilaterally    Log roll - negative  THOMAS - negative for anterior hip pain, soreness felt just distal to greater trochanter  FADIR - negative for anterior hip pain, soreness felt just distal to greater trochanter  Scour test - negative    ASIS compression test - negative  SI drawer test - negative   Thigh thrust test - negative     Piriformis test (Bonnet's) - negative  Quadriceps flexibility symmetric.  Carlos test - positive for pain, improved compared to last visit      Posture:  Upright and Increased thoracic kyphosis  Gait: Non-antalgic and Left antalgic with Neutral ankle mechanics and Neutral medial arch    TART (Tissue texture abnormality, Asymmetry,  Restriction of motion and/or Tenderness) changes:    Lumbar spine:  ERS left at L5  FRS left at L4    Lower extremity:  TTA left proximal IT band  Multiple, very tender fascial herniated trigger points just distal to the glute muscle attachment on the greater trochanter.  Fascial trigger band from mid IT band to distal 1/3 of anterior thigh    Pelvis:  · Innominate:Left posterior rotation  · Pubic bone:Right superior pubic shear    Sacrum:Right on Right sacral torsion    Key   F= Flexed   E = Extended   R = Rotated   S = Sidebent   TTA = tissue texture abnormality     Neurovascular Exam:  Normal gait without Trendelenburg.  2+ femoral, DP, and PT pulses BL.  No skin changes, no abnormal hair distribution.  Sensation intact to light touch throughout the obturator and medial/lateral/posterior femoral cutaneous nerves.  Capillary refill intact to <2 seconds in all lower limb digits.      ASSESSMENT:      ICD-10-CM ICD-9-CM    1. Lateral pain of left hip M25.552 719.45   2. Gluteal tendonitis of left buttock M76.02 726.5   3. Muscle weakness - core M62.81 728.87   4. Myalgia M79.10 729.1   5. Somatic dysfunction of lumbar region M99.03 739.3   6. Somatic dysfunction of pelvis region M99.05 739.5   7. Somatic dysfunction of lower extremity M99.06 739.6   8. Somatic dysfunction of sacral region M99.04 739.4       PLAN:  1-4.  Lateral pain of left hip/gluteal tendinitis/core muscle weakness/myalgia - improved    - Estela is here today for a follow-up for her left lateral hip pain where OMT was done at her last visit.. She is status post left trochanteric bursa resection surgery done in November of 2018.  Since OMT at last visit, she admits to about 75% improvement of her symptoms.    - OMT was done at her last visit and she experienced almost immediate pain relief after doing FDM to her exam findings around her IT band.  As she is still having some discomfort, she is interested in further evaluation for treatment today. She consents to treatment. See below for further details.    - Her exercises were discussed with her physical therapist in [place].  I advised for them to modify her exercises and update her home exercise program, and she states that they gave her some different stretches and exercises but did not work with her further on these.  She had since stopped going to physical therapy and has been doing the exercises that I had prescribed for her with good pain relief and continued improvement of her symptoms. She is not planning on going back to PT as she feels she does not need.    - As she is getting good benefit for Mobic 15 mg, I refill this for her today. I advised her to continue to take this daily in between now and the wedding in 8 days.      5-6.  Somatic dysfunction of lower extremity, pelvis regions -     OMT 3-4 regions. Oral consent obtained. Reviewed benefits and potential side effects. OMT indicated today due to  signs and symptoms as well as local and remote somatic dysfunction findings and their related neurokinetic, lymphatic, fascial and/or arteriovenous body connections. OMT techniques used: Soft Tissue, Myofascial Release and Muscle Energy. Treatment was tolerated well. Improvement noted in segmental mobility post-treatment in dysfunctional regions. There were no adverse events and no complications immediately following treatment. Advised plenty of water to help alleviate soreness.      Future planning includes - she is following with Dr. Barreto next week and will update him with her symptom improvement    All questions were answered to the best of my ability and all concerns were addressed at this time.    Follow up in 2-3 weeks for above, or sooner if needed.

## 2019-03-11 ENCOUNTER — OFFICE VISIT (OUTPATIENT)
Dept: SPORTS MEDICINE | Facility: CLINIC | Age: 54
End: 2019-03-11
Payer: COMMERCIAL

## 2019-03-11 VITALS
SYSTOLIC BLOOD PRESSURE: 112 MMHG | DIASTOLIC BLOOD PRESSURE: 74 MMHG | BODY MASS INDEX: 26.52 KG/M2 | HEART RATE: 58 BPM | WEIGHT: 175 LBS | HEIGHT: 68 IN

## 2019-03-11 DIAGNOSIS — R26.9 GAIT DIFFICULTY: ICD-10-CM

## 2019-03-11 DIAGNOSIS — M62.81 MUSCLE WEAKNESS OF LOWER EXTREMITY: ICD-10-CM

## 2019-03-11 DIAGNOSIS — M70.62 TROCHANTERIC BURSITIS OF LEFT HIP: Primary | ICD-10-CM

## 2019-03-11 PROCEDURE — 99213 OFFICE O/P EST LOW 20 MIN: CPT | Mod: S$GLB,,, | Performed by: ORTHOPAEDIC SURGERY

## 2019-03-11 PROCEDURE — 99213 PR OFFICE/OUTPT VISIT, EST, LEVL III, 20-29 MIN: ICD-10-PCS | Mod: S$GLB,,, | Performed by: ORTHOPAEDIC SURGERY

## 2019-03-11 PROCEDURE — 99999 PR PBB SHADOW E&M-EST. PATIENT-LVL III: ICD-10-PCS | Mod: PBBFAC,,, | Performed by: ORTHOPAEDIC SURGERY

## 2019-03-11 PROCEDURE — 3008F PR BODY MASS INDEX (BMI) DOCUMENTED: ICD-10-PCS | Mod: CPTII,S$GLB,, | Performed by: ORTHOPAEDIC SURGERY

## 2019-03-11 PROCEDURE — 3008F BODY MASS INDEX DOCD: CPT | Mod: CPTII,S$GLB,, | Performed by: ORTHOPAEDIC SURGERY

## 2019-03-11 PROCEDURE — 99999 PR PBB SHADOW E&M-EST. PATIENT-LVL III: CPT | Mod: PBBFAC,,, | Performed by: ORTHOPAEDIC SURGERY

## 2019-03-11 NOTE — PROGRESS NOTES
HISTORY OF PRESENT ILLNESS:   Pt is here today for post-operative followup of her hip bursa arthroscopy.  she is doing much better than at her last visit.  We have reviewed her findings and discussed plan of care and future treatment options.      She notes doing her HEP on a regular basis   She completed PT at [facility] physical therapy in [place]     She has been seeing Villa Sánchez DO for L piriformis injection but ended up doing OMT instead and states she is feeling much better. Has occaisional soreness but overall much better than before surgery.  He daughters wedding is next weekend and she is excited about that.  Still working on core/glute/quad strengthening with HEP.  Taking Mobic daily which helps    Review of Systems   Constitution: Negative. Negative for chills, fever and night sweats.   HENT: Negative for congestion and headaches.    Eyes: Negative for blurred vision, left vision loss and right vision loss.   Cardiovascular: Negative for chest pain and syncope.   Respiratory: Negative for cough and shortness of breath.    Endocrine: Negative for polydipsia, polyphagia and polyuria.   Hematologic/Lymphatic: Negative for bleeding problem. Does not bruise/bleed easily.   Skin: Negative for dry skin, itching and rash.   Musculoskeletal: Negative for falls and muscle weakness.   Gastrointestinal: Negative for abdominal pain and bowel incontinence.   Genitourinary: Negative for bladder incontinence and nocturia.   Neurological: Negative for disturbances in coordination, loss of balance and seizures.   Psychiatric/Behavioral: Negative for depression. The patient does not have insomnia.    Allergic/Immunologic: Negative for hives and persistent infections.       PAST MEDICAL HISTORY:   Past Medical History:   Diagnosis Date    Arrhythmia     bigeminy & trigeminy     followed cardiologist    Pulmonary embolism     Bilateral     PAST SURGICAL HISTORY:   Past Surgical History:   Procedure Laterality Date     APPENDECTOMY       SECTION N/A     HYSTERECTOMY Bilateral     LAPAROSCOPY W/ MINI-LAPAROTOMY Right     REPAIR, Trochanteric Bursectomy Left 11/15/2018    Performed by Nika Barreto MD at [facility]    SHOULDER ARTHROSCOPY       FAMILY HISTORY: History reviewed. No pertinent family history.  SOCIAL HISTORY:   Social History     Socioeconomic History    Marital status:      Spouse name: Not on file    Number of children: Not on file    Years of education: Not on file    Highest education level: Not on file   Social Needs    Financial resource strain: Not on file    Food insecurity - worry: Not on file    Food insecurity - inability: Not on file    Transportation needs - medical: Not on file    Transportation needs - non-medical: Not on file   Occupational History    Not on file   Tobacco Use    Smoking status: Never Smoker    Smokeless tobacco: Never Used   Substance and Sexual Activity    Alcohol use: Yes     Comment: socially    Drug use: Not on file    Sexual activity: Not on file   Other Topics Concern    Not on file   Social History Narrative    Not on file       MEDICATIONS:   Current Outpatient Medications:     HYDROcodone-acetaminophen (NORCO)  mg per tablet, Take 1 tablet by mouth every 4-6 hours for pain., Disp: 28 tablet, Rfl: 0    losartan (COZAAR) 25 MG tablet, Take 25 mg by mouth every evening. , Disp: , Rfl:     meloxicam (MOBIC) 15 MG tablet, Take 1 tablet (15 mg total) by mouth once daily., Disp: 30 tablet, Rfl: 0    promethazine (PHENERGAN) 25 MG tablet, Take 1 tablet (25 mg total) by mouth every 6 (six) hours as needed for Nausea., Disp: 16 tablet, Rfl: 0    traMADol (ULTRAM) 50 mg tablet, Take 1-2 tablets ( mg total) by mouth every 6 (six) hours as needed (surgical pain)., Disp: 28 tablet, Rfl: 0    omeprazole (PRILOSEC) 20 MG capsule, Take 1 capsule (20 mg total) by mouth once daily. To protect your stomach., Disp: 25 capsule, Rfl: 1  ALLERGIES:  "  Review of patient's allergies indicates:   Allergen Reactions    Morphine Itching    Penicillins Rash       VITAL SIGNS: /74   Pulse (!) 58   Ht 5' 7.5" (1.715 m)   Wt 79.4 kg (175 lb)   BMI 27.00 kg/m²        DATE OF PROCEDURE: 11/15/2018  PROCEDURE:    Left:  1. Hip arthroscopic trochanteric bursectomy   2. Hip gluteal sling release (CPT 98221)                                                                               PHYSICAL EXAMINATION:     Incision sites healed well  No evidence of any erythema, infection or induration  Flexion ROM 0-90 degrees   No effusion  2+ DP pulse  No swelling, no calf tenderness  - Laura's sign    - tenderness  5-/5 abduction strength without pain                                                                                  ASSESSMENT:                                                                                                                                               1. Status post above, doing well.                                                                                                                               PLAN:                                                                                                                                                     1. Continue with current HEP   2. Emphasized core function.  3. I have discussed return to activity in detail, activity as tolerated, recommended swimming instead of walking for exercise.  4. she will see us back in May for her 6 mo follow up with hip form.   5. All questions were answered and she should contact us if she has any questions or concerns in the interim.                           "

## 2019-05-29 ENCOUNTER — OFFICE VISIT (OUTPATIENT)
Dept: SPORTS MEDICINE | Facility: CLINIC | Age: 54
End: 2019-05-29
Payer: COMMERCIAL

## 2019-05-29 VITALS — HEIGHT: 68 IN | WEIGHT: 175 LBS | BODY MASS INDEX: 26.52 KG/M2

## 2019-05-29 DIAGNOSIS — M25.552 LEFT HIP PAIN: Primary | ICD-10-CM

## 2019-05-29 PROCEDURE — 99214 OFFICE O/P EST MOD 30 MIN: CPT | Mod: S$GLB,,, | Performed by: ORTHOPAEDIC SURGERY

## 2019-05-29 PROCEDURE — 3008F BODY MASS INDEX DOCD: CPT | Mod: CPTII,S$GLB,, | Performed by: ORTHOPAEDIC SURGERY

## 2019-05-29 PROCEDURE — 99999 PR PBB SHADOW E&M-EST. PATIENT-LVL III: CPT | Mod: PBBFAC,,, | Performed by: ORTHOPAEDIC SURGERY

## 2019-05-29 PROCEDURE — 99214 PR OFFICE/OUTPT VISIT, EST, LEVL IV, 30-39 MIN: ICD-10-PCS | Mod: S$GLB,,, | Performed by: ORTHOPAEDIC SURGERY

## 2019-05-29 PROCEDURE — 99999 PR PBB SHADOW E&M-EST. PATIENT-LVL III: ICD-10-PCS | Mod: PBBFAC,,, | Performed by: ORTHOPAEDIC SURGERY

## 2019-05-29 PROCEDURE — 3008F PR BODY MASS INDEX (BMI) DOCUMENTED: ICD-10-PCS | Mod: CPTII,S$GLB,, | Performed by: ORTHOPAEDIC SURGERY

## 2019-05-29 NOTE — PROGRESS NOTES
HISTORY OF PRESENT ILLNESS:   Pt is here today for followup of her hip bursa arthroscopy.  she is doing much better than at her last visit.  We have reviewed her findings and discussed plan of care and future treatment options.      She notes doing her HEP on a regular basis   She completed PT at [facility] physical therapy in [place]     She has been seeing Villa Sánchez DO for L piriformis injection but ended up doing OMT instead and states she is feeling much better. Has occaisional soreness but overall much better than before surgery.  He daughters wedding is next weekend and she is excited about that.  Still working on core/glute/quad strengthening with HEP.  Taking Mobic daily which helps    Review of Systems   Constitution: Negative. Negative for chills, fever and night sweats.   HENT: Negative for congestion and headaches.    Eyes: Negative for blurred vision, left vision loss and right vision loss.   Cardiovascular: Negative for chest pain and syncope.   Respiratory: Negative for cough and shortness of breath.    Endocrine: Negative for polydipsia, polyphagia and polyuria.   Hematologic/Lymphatic: Negative for bleeding problem. Does not bruise/bleed easily.   Skin: Negative for dry skin, itching and rash.   Musculoskeletal: Negative for falls and muscle weakness.   Gastrointestinal: Negative for abdominal pain and bowel incontinence.   Genitourinary: Negative for bladder incontinence and nocturia.   Neurological: Negative for disturbances in coordination, loss of balance and seizures.   Psychiatric/Behavioral: Negative for depression. The patient does not have insomnia.    Allergic/Immunologic: Negative for hives and persistent infections.       PAST MEDICAL HISTORY:   Past Medical History:   Diagnosis Date    Arrhythmia     bigeminy & trigeminy     followed cardiologist    Pulmonary embolism     Bilateral     PAST SURGICAL HISTORY:   Past Surgical History:   Procedure Laterality Date    APPENDECTOMY        SECTION N/A     HYSTERECTOMY Bilateral     LAPAROSCOPY W/ MINI-LAPAROTOMY Right     REPAIR, Trochanteric Bursectomy Left 11/15/2018    Performed by Nika Barreto MD at [facility] OR    SHOULDER ARTHROSCOPY       FAMILY HISTORY: History reviewed. No pertinent family history.  SOCIAL HISTORY:   Social History     Socioeconomic History    Marital status:      Spouse name: Not on file    Number of children: Not on file    Years of education: Not on file    Highest education level: Not on file   Occupational History    Not on file   Social Needs    Financial resource strain: Not on file    Food insecurity:     Worry: Not on file     Inability: Not on file    Transportation needs:     Medical: Not on file     Non-medical: Not on file   Tobacco Use    Smoking status: Never Smoker    Smokeless tobacco: Never Used   Substance and Sexual Activity    Alcohol use: Yes     Comment: socially    Drug use: Not on file    Sexual activity: Not on file   Lifestyle    Physical activity:     Days per week: Not on file     Minutes per session: Not on file    Stress: Not on file   Relationships    Social connections:     Talks on phone: Not on file     Gets together: Not on file     Attends Protestant service: Not on file     Active member of club or organization: Not on file     Attends meetings of clubs or organizations: Not on file     Relationship status: Not on file   Other Topics Concern    Not on file   Social History Narrative    Not on file       MEDICATIONS:   Current Outpatient Medications:     HYDROcodone-acetaminophen (NORCO)  mg per tablet, Take 1 tablet by mouth every 4-6 hours for pain., Disp: 28 tablet, Rfl: 0    losartan (COZAAR) 25 MG tablet, Take 25 mg by mouth every evening. , Disp: , Rfl:     meloxicam (MOBIC) 15 MG tablet, Take 1 tablet (15 mg total) by mouth once daily., Disp: 30 tablet, Rfl: 0    omeprazole (PRILOSEC) 20 MG capsule, Take 1 capsule (20 mg total) by mouth once  "daily. To protect your stomach., Disp: 25 capsule, Rfl: 1    promethazine (PHENERGAN) 25 MG tablet, Take 1 tablet (25 mg total) by mouth every 6 (six) hours as needed for Nausea., Disp: 16 tablet, Rfl: 0    traMADol (ULTRAM) 50 mg tablet, Take 1-2 tablets ( mg total) by mouth every 6 (six) hours as needed (surgical pain)., Disp: 28 tablet, Rfl: 0  ALLERGIES:   Review of patient's allergies indicates:   Allergen Reactions    Morphine Itching    Penicillins Rash       VITAL SIGNS: Ht 5' 7.5" (1.715 m)   Wt 79.4 kg (175 lb)   BMI 27.00 kg/m²        DATE OF PROCEDURE: 11/15/2018  PROCEDURE:    Left:  1. Hip arthroscopic trochanteric bursectomy   2. Hip gluteal sling release (CPT 69592)                                                                               PHYSICAL EXAMINATION:     Incision sites healed well  No evidence of any erythema, infection or induration  Flexion ROM 0-90 degrees   No effusion  2+ DP pulse  No swelling, no calf tenderness  - Laura's sign    - tenderness  5/5 abduction strength without pain                                                                                  ASSESSMENT:                                                                                                                                               1. Status post above, doing well.                                                                                                                               PLAN:                                                                                                                                                     1. Continue with current HEP   2. Emphasized core function.  3. I have discussed return to activity in detail, activity as tolerated, recommended swimming instead of walking for exercise.  4. she will see us back in May for her 6 mo follow up with hip form.   5. All questions were answered and she should contact us if she has any questions or concerns in " the interim.

## 2019-11-22 ENCOUNTER — OFFICE VISIT (OUTPATIENT)
Dept: SPORTS MEDICINE | Facility: CLINIC | Age: 54
End: 2019-11-22
Payer: COMMERCIAL

## 2019-11-22 VITALS
TEMPERATURE: 98 F | SYSTOLIC BLOOD PRESSURE: 101 MMHG | BODY MASS INDEX: 28.25 KG/M2 | HEIGHT: 67 IN | HEART RATE: 79 BPM | DIASTOLIC BLOOD PRESSURE: 66 MMHG | WEIGHT: 180 LBS

## 2019-11-22 DIAGNOSIS — M99.06 SOMATIC DYSFUNCTION OF LOWER EXTREMITY: ICD-10-CM

## 2019-11-22 DIAGNOSIS — M79.10 MYALGIA: ICD-10-CM

## 2019-11-22 DIAGNOSIS — M62.81 MUSCLE WEAKNESS OF LOWER EXTREMITY: ICD-10-CM

## 2019-11-22 DIAGNOSIS — M25.552 LATERAL PAIN OF LEFT HIP: Primary | ICD-10-CM

## 2019-11-22 PROCEDURE — 3008F BODY MASS INDEX DOCD: CPT | Mod: CPTII,S$GLB,, | Performed by: ORTHOPAEDIC SURGERY

## 2019-11-22 PROCEDURE — 99214 PR OFFICE/OUTPT VISIT, EST, LEVL IV, 30-39 MIN: ICD-10-PCS | Mod: 25,S$GLB,, | Performed by: ORTHOPAEDIC SURGERY

## 2019-11-22 PROCEDURE — 99999 PR PBB SHADOW E&M-EST. PATIENT-LVL III: CPT | Mod: PBBFAC,,, | Performed by: ORTHOPAEDIC SURGERY

## 2019-11-22 PROCEDURE — 99214 OFFICE O/P EST MOD 30 MIN: CPT | Mod: 25,S$GLB,, | Performed by: ORTHOPAEDIC SURGERY

## 2019-11-22 PROCEDURE — 98925 PR OSTEOPATHIC MANIP,1-2 BODY REGN: ICD-10-PCS | Mod: S$GLB,,, | Performed by: ORTHOPAEDIC SURGERY

## 2019-11-22 PROCEDURE — 3008F PR BODY MASS INDEX (BMI) DOCUMENTED: ICD-10-PCS | Mod: CPTII,S$GLB,, | Performed by: ORTHOPAEDIC SURGERY

## 2019-11-22 PROCEDURE — 99999 PR PBB SHADOW E&M-EST. PATIENT-LVL III: ICD-10-PCS | Mod: PBBFAC,,, | Performed by: ORTHOPAEDIC SURGERY

## 2019-11-22 PROCEDURE — 98925 OSTEOPATH MANJ 1-2 REGIONS: CPT | Mod: S$GLB,,, | Performed by: ORTHOPAEDIC SURGERY

## 2019-11-22 NOTE — PROGRESS NOTES
"CC: left lateral hip pain   Miss Vázquez is here today for follow up on her left lateral hip pain. Patient reports that she did receive relief from her OMT procedure after her last visit on 03/08/2019. She was completing her home exercise program regularly prior to her left rotator cuff repair procedure 2 weeks ago. She present wearing her sling and pillow as instructed by her surgeon. She does feel that the OMT release was helpful for her and is interested in knowing if it will be helpful again.    Recall from visit 3/8/2019  Miss Vázquez is here today for follow up evaluation of her left lateral hip pain. Patient reports she is still having good pain relief from OMT at her last visit.  She states that the treatment directed at her IT band was incredibly helpful in decreasing her pain. Patient reports she recently declared her last day with PT last week as she states that they never gave her any information about the exercise modifications that Dr. Sánchez spoke with them about. She is doing her HEP given to her by Dr. Sánchez as well as some of her PT exercises daily and she is finding symptom improvement by doing them. She reports she does feel some soreness in her gluteal area which she attributes to working out and believes she is getting stronger. Patient has been taking Mobic 15mg and states it has helped her pain a lot as well. She is fearful to stop taking it as she does not want her paint to come back. Patient states overall she feels about 75% better than at her last visit.  Her daughter's wedding is in 1 week and she is feeling as though she will be able to walk and dance during the event, which she is very pleased about.      Recall from visit on 2/18/2019  54 y.o. Female presents today for follow up evaluation of her "left piriformis pain".     Patient states her hip pain has been extreme for the past two months. She previously had a trochanteric bursectomy of her left hip with Dr. Barreto on 11/15/2018. " "She is here today for evaluation for an injection into her piriformis. She has been doing physical therapy and believes it is making her stronger, but has not seemed to be helping her pain. At physical therapy she does step ups, practices getting up out of a chair, clam shells, single leg bridges, and some manual therapy. Patient states she feels the best when walking but was advised not to do too much walking. Getting up out of a chair and taking her initial few steps causes her the most pain and weakness. She is unable to cross her legs due to pain. She does not have any discomfort with sitting for long periods of time or with long car rides. She reports a "little hot dog" like lumps in her upper left hip area that causes her the most pain. This pain diffuses through her lateral upper leg. When asked where she hurts specifically, she points just distal to her left greater trochanter along her IT band.  When asked if she is experiencing buttock pain, she denies any specific symptoms in this area.    She does state that she had an MRI done prior to surgery that showed inflammation around the area of her trochanteric bursa as well as tearing of two of her gluteus muscles.  She does not have the disc or report with her at this time.     REVIEW OF SYSTEMS:   Constitution: Patient denies fever or chills.  Eyes: Patient denies eye pain or vision changes.  CVS: Patient denies chest pain.  Lungs: Patient denies shortness of breath or cough.  Skin: Patient denies skin rash or itching.    Musculoskeletal: Patient denies recent falls. See HPI.  Psych: Patient denies any current anxiety or nervousness.    PAST MEDICAL HISTORY:   Past Medical History:   Diagnosis Date    Arrhythmia     bigeminy & trigeminy     followed cardiologist    Pulmonary embolism     Bilateral       MEDICATIONS:     Current Outpatient Medications:     losartan (COZAAR) 25 MG tablet, Take 25 mg by mouth every evening. , Disp: , Rfl:     " "HYDROcodone-acetaminophen (NORCO)  mg per tablet, Take 1 tablet by mouth every 4-6 hours for pain., Disp: 28 tablet, Rfl: 0    meloxicam (MOBIC) 15 MG tablet, Take 1 tablet (15 mg total) by mouth once daily., Disp: 30 tablet, Rfl: 0    omeprazole (PRILOSEC) 20 MG capsule, Take 1 capsule (20 mg total) by mouth once daily. To protect your stomach., Disp: 25 capsule, Rfl: 1    promethazine (PHENERGAN) 25 MG tablet, Take 1 tablet (25 mg total) by mouth every 6 (six) hours as needed for Nausea., Disp: 16 tablet, Rfl: 0    traMADol (ULTRAM) 50 mg tablet, Take 1-2 tablets ( mg total) by mouth every 6 (six) hours as needed (surgical pain)., Disp: 28 tablet, Rfl: 0    ALLERGIES:   Review of patient's allergies indicates:   Allergen Reactions    Morphine Itching    Penicillins Rash        PHYSICAL EXAMINATION:  /66   Pulse 79   Temp 98.2 °F (36.8 °C) (Oral)   Ht 5' 7" (1.702 m)   Wt 81.6 kg (180 lb)   BMI 28.19 kg/m²   Vitals signs and nursing note have been reviewed.  General: In no acute distress, well developed, well nourished, no diaphoresis  Eyes: EOM full and smooth, no eye redness or discharge  HENT: normocephalic and atraumatic, neck supple, trachea midline, no nasal discharge  Cardiovascular: no LE edema  Lungs: respirations non-labored, no conversational dyspnea   Neuro: AAOx3, CN2-12 grossly intact  Skin: No rashes, warm and dry  Psychiatric: cooperative, pleasant, mood and affect appropriate for age    HIP: left   The affected hip is compared to the contralateral hip.    Observation:    There is no edema, erythema, or ecchymosis in the lumbosacral region.   There is no Trendelenburg sign on either side  Left hemipelvis higher than right when standing.    No thoracolumbar scoliosis observed.    No midline skin abnormalities.  No atrophy noted in the lower limbs.    ROM:   Passive hip flexion to 120° bilaterally.    Passive hip internal rotation to 45° bilaterally.    Passive hip " external rotation to 45° bilaterally.    Passive hip abduction to 45° bilaterally.    All motions above are without pain.    Tenderness To Palpation:  No tenderness at the ASIS, AIIS, PSIS, PIIS, iliac crest, pubic bones, ischial tuberosity.  No tenderness throughout the lumbar spine, iliolumbar region, or posterior pelvis.  No tenderness throughout the sacrum or piriformis  +tenderness at the glut attachments on the greater trochanter  + tenderness over proximal IT band and TFL over multiple fascial herniated trigger points    Strength Testing:  Hip flexion - 5/5  Hip extension - 5/5  Hip adduction - 5/5  Hip abduction - 5/5  Knee flexion - 5/5  Knee extension - 5/5    Special Tests:  Standing Trendelenburg test - negative    Seated straight leg raise - negative  Supine straight leg raise - negative  Hamstring flexibility symmetric, but tight bilaterally    Log roll - negative  THOMAS - negative for anterior hip pain, soreness felt just distal to greater trochanter  FADIR - negative for anterior hip pain, soreness felt just distal to greater trochanter  Scour test - negative    ASIS compression test - negative  SI drawer test - negative   Thigh thrust test - negative     Piriformis test (Bonnet's) - negative  Quadriceps flexibility symmetric.  Carlos test - positive for pain, improved compared to last visit      Posture:  Upright and Increased thoracic kyphosis  Gait: Non-antalgic and Left antalgic with Neutral ankle mechanics and Neutral medial arch    TART (Tissue texture abnormality, Asymmetry,  Restriction of motion and/or Tenderness) changes:    Lower extremity:  TTA left proximal IT band  Multiple fascial herniated trigger points posterior to the TFL on the left  Fascial herniated trigger point in the proximal IT band on the left    Key   F= Flexed   E = Extended   R = Rotated   S = Sidebent   TTA = tissue texture abnormality     Neurovascular Exam:  Normal gait without Trendelenburg.  2+ femoral, DP, and PT  pulses BL.  No skin changes, no abnormal hair distribution.  Sensation intact to light touch throughout the obturator and medial/lateral/posterior femoral cutaneous nerves.  Capillary refill intact to <2 seconds in all lower limb digits.      ASSESSMENT:      ICD-10-CM ICD-9-CM   1. Lateral pain of left hip M25.552 719.45   2. Muscle weakness of lower extremity M62.81 728.87   3. Myalgia M79.10 729.1   4. Somatic dysfunction of lower extremity M99.06 739.6       PLAN:  1-4.  Lateral pain of left hip/muscle weakness/myalgia - improved, then deteriorated    - Estela is here today for a follow-up for her left lateral hip pain where OMT was done at her last visit on 03/08/2019. She is status post left trochanteric bursa resection surgery done in November of 2018.  S she received approximately 75% symptom improvement after OMT done at last visit, but her pain has returned as she has been in [place] for the past several months.  She also was able to walk down the aisle and dance at her julio césar wedding which she states was possible because of the relief from OMT.  She was consistent with her HEP, but had rotator cuff surgery done 2 weeks ago so she has not done any of her hip exercises in the past 2 weeks.    - Based on her description/body language of pain and somatic dysfunction identified on exam, I discussed osteopathic manipulation as a treatment option today.  She consents to evaluation and treatment.  See below.    - Continue with previously prescribed home exercises.      4.  Somatic dysfunction of lower extremity region -     OMT 1-2 regions. Oral consent obtained. Reviewed benefits and potential side effects. OMT indicated today due to signs and symptoms as well as local and remote somatic dysfunction findings and their related neurokinetic, lymphatic, fascial and/or arteriovenous body connections. OMT techniques used: Fascial Distortion Model. Treatment was tolerated well. Improvement noted in segmental mobility  post-treatment in dysfunctional regions. There were no adverse events and no complications immediately following treatment. Advised plenty of water to help alleviate soreness.      Future planning includes - possibly more OMT if helpful and if indicated    All questions were answered to the best of my ability and all concerns were addressed at this time.    Follow up in 4-6 weeks for above if needed.      This note is dictated using the M*Modal Fluency Direct word recognition program. There are word recognition mistakes that are occasionally missed on review.

## 2019-12-20 ENCOUNTER — OFFICE VISIT (OUTPATIENT)
Dept: SPORTS MEDICINE | Facility: CLINIC | Age: 54
End: 2019-12-20
Payer: COMMERCIAL

## 2019-12-20 VITALS — WEIGHT: 180 LBS | TEMPERATURE: 98 F | BODY MASS INDEX: 28.25 KG/M2 | HEIGHT: 67 IN

## 2019-12-20 DIAGNOSIS — M70.62 TROCHANTERIC BURSITIS OF LEFT HIP: Primary | ICD-10-CM

## 2019-12-20 PROCEDURE — 3008F PR BODY MASS INDEX (BMI) DOCUMENTED: ICD-10-PCS | Mod: CPTII,S$GLB,, | Performed by: ORTHOPAEDIC SURGERY

## 2019-12-20 PROCEDURE — 3008F BODY MASS INDEX DOCD: CPT | Mod: CPTII,S$GLB,, | Performed by: ORTHOPAEDIC SURGERY

## 2019-12-20 PROCEDURE — 99214 OFFICE O/P EST MOD 30 MIN: CPT | Mod: S$GLB,,, | Performed by: ORTHOPAEDIC SURGERY

## 2019-12-20 PROCEDURE — 99999 PR PBB SHADOW E&M-EST. PATIENT-LVL III: CPT | Mod: PBBFAC,,, | Performed by: ORTHOPAEDIC SURGERY

## 2019-12-20 PROCEDURE — 99999 PR PBB SHADOW E&M-EST. PATIENT-LVL III: ICD-10-PCS | Mod: PBBFAC,,, | Performed by: ORTHOPAEDIC SURGERY

## 2019-12-20 PROCEDURE — 99214 PR OFFICE/OUTPT VISIT, EST, LEVL IV, 30-39 MIN: ICD-10-PCS | Mod: S$GLB,,, | Performed by: ORTHOPAEDIC SURGERY

## 2019-12-20 NOTE — PROGRESS NOTES
CC left hip pain    HISTORY OF PRESENT ILLNESS:   Pt is here today for followup of her hip bursa arthroscopy.  she is doing much better than at her last visit.  We have reviewed her findings and discussed plan of care and future treatment options.      She notes doing her HEP on a regular basis   She completed PT at [facility] physical therapy in [place]     She has been seeing [place] Sánchez DO for L piriformis injection but ended up doing OMT instead and states she is feeling much better. Has occaisional soreness but overall much better than before surgery.  He daughters wedding is next weekend and she is excited about that.  Still working on core/glute/quad strengthening with HEP.  Taking Mobic daily which helps    95% better   SANE 95  preop SANE 50    DATE OF PROCEDURE: 11/15/2018  PROCEDURE:    Left:  1. Hip arthroscopic trochanteric bursectomy   2. Hip gluteal sling release (CPT 65271)    Review of Systems   Constitution: Negative. Negative for chills, fever and night sweats.   HENT: Negative for congestion and headaches.    Eyes: Negative for blurred vision, left vision loss and right vision loss.   Cardiovascular: Negative for chest pain and syncope.   Respiratory: Negative for cough and shortness of breath.    Endocrine: Negative for polydipsia, polyphagia and polyuria.   Hematologic/Lymphatic: Negative for bleeding problem. Does not bruise/bleed easily.   Skin: Negative for dry skin, itching and rash.   Musculoskeletal: Negative for falls and muscle weakness.   Gastrointestinal: Negative for abdominal pain and bowel incontinence.   Genitourinary: Negative for bladder incontinence and nocturia.   Neurological: Negative for disturbances in coordination, loss of balance and seizures.   Psychiatric/Behavioral: Negative for depression. The patient does not have insomnia.    Allergic/Immunologic: Negative for hives and persistent infections.       PAST MEDICAL HISTORY:   Past Medical History:   Diagnosis Date     Arrhythmia     bigeminy & trigeminy     followed cardiologist    Pulmonary embolism     Bilateral     PAST SURGICAL HISTORY:   Past Surgical History:   Procedure Laterality Date    APPENDECTOMY       SECTION N/A     HYSTERECTOMY Bilateral     LAPAROSCOPY W/ MINI-LAPAROTOMY Right     SHOULDER ARTHROSCOPY       FAMILY HISTORY: No family history on file.  SOCIAL HISTORY:   Social History     Socioeconomic History    Marital status:      Spouse name: Not on file    Number of children: Not on file    Years of education: Not on file    Highest education level: Not on file   Occupational History    Not on file   Social Needs    Financial resource strain: Not on file    Food insecurity:     Worry: Not on file     Inability: Not on file    Transportation needs:     Medical: Not on file     Non-medical: Not on file   Tobacco Use    Smoking status: Never Smoker    Smokeless tobacco: Never Used   Substance and Sexual Activity    Alcohol use: Yes     Comment: socially    Drug use: Not on file    Sexual activity: Not on file   Lifestyle    Physical activity:     Days per week: Not on file     Minutes per session: Not on file    Stress: Not on file   Relationships    Social connections:     Talks on phone: Not on file     Gets together: Not on file     Attends Yazidi service: Not on file     Active member of club or organization: Not on file     Attends meetings of clubs or organizations: Not on file     Relationship status: Not on file   Other Topics Concern    Not on file   Social History Narrative    Not on file       MEDICATIONS:   Current Outpatient Medications:     losartan (COZAAR) 25 MG tablet, Take 25 mg by mouth every evening. , Disp: , Rfl:     HYDROcodone-acetaminophen (NORCO)  mg per tablet, Take 1 tablet by mouth every 4-6 hours for pain., Disp: 28 tablet, Rfl: 0    meloxicam (MOBIC) 15 MG tablet, Take 1 tablet (15 mg total) by mouth once daily., Disp: 30 tablet,  "Rfl: 0    omeprazole (PRILOSEC) 20 MG capsule, Take 1 capsule (20 mg total) by mouth once daily. To protect your stomach., Disp: 25 capsule, Rfl: 1    promethazine (PHENERGAN) 25 MG tablet, Take 1 tablet (25 mg total) by mouth every 6 (six) hours as needed for Nausea., Disp: 16 tablet, Rfl: 0    traMADol (ULTRAM) 50 mg tablet, Take 1-2 tablets ( mg total) by mouth every 6 (six) hours as needed (surgical pain)., Disp: 28 tablet, Rfl: 0  ALLERGIES:   Review of patient's allergies indicates:   Allergen Reactions    Morphine Itching    Penicillins Rash       VITAL SIGNS: Temp 98.4 °F (36.9 °C) (Oral)   Ht 5' 7" (1.702 m)   Wt 81.6 kg (180 lb)   BMI 28.19 kg/m²                                                                                   PHYSICAL EXAMINATION:     Incision sites healed well  No evidence of any erythema, infection or induration  Flexion ROM 0-90 degrees   No effusion  2+ DP pulse  No swelling, no calf tenderness  - Luara's sign    - tenderness  5/5 abduction strength without pain                                                                                  ASSESSMENT:                                                                                                                                               1. Status post above, doing well.                                                                                                                               PLAN:                                                                                                                                                     1. Continue with current HEP   2. Emphasized core function.  3. I have discussed return to activity in detail, activity as tolerated, recommended swimming instead of walking for exercise.  4. she will see us back in May for her 6 mo follow up with hip form.   5. All questions were answered and she should contact us if she has any questions or concerns in the interim.        "

## 2019-12-23 ENCOUNTER — OFFICE VISIT (OUTPATIENT)
Dept: URGENT CARE | Facility: CLINIC | Age: 54
End: 2019-12-23
Payer: COMMERCIAL

## 2019-12-23 VITALS
RESPIRATION RATE: 18 BRPM | TEMPERATURE: 99 F | OXYGEN SATURATION: 98 % | DIASTOLIC BLOOD PRESSURE: 81 MMHG | HEART RATE: 66 BPM | WEIGHT: 180 LBS | SYSTOLIC BLOOD PRESSURE: 140 MMHG | HEIGHT: 67 IN | BODY MASS INDEX: 28.25 KG/M2

## 2019-12-23 DIAGNOSIS — R30.0 DYSURIA: ICD-10-CM

## 2019-12-23 DIAGNOSIS — N39.0 URINARY TRACT INFECTION WITHOUT HEMATURIA, SITE UNSPECIFIED: Primary | ICD-10-CM

## 2019-12-23 LAB
BILIRUB UR QL STRIP: NEGATIVE
GLUCOSE UR QL STRIP: NEGATIVE
KETONES UR QL STRIP: NEGATIVE
LEUKOCYTE ESTERASE UR QL STRIP: POSITIVE
PH, POC UA: 7.5 (ref 5–8)
POC BLOOD, URINE: NEGATIVE
POC NITRATES, URINE: NEGATIVE
PROT UR QL STRIP: NEGATIVE
SP GR UR STRIP: 1 (ref 1–1.03)
UROBILINOGEN UR STRIP-ACNC: NORMAL (ref 0.1–1.1)

## 2019-12-23 PROCEDURE — 81003 POCT URINALYSIS, DIPSTICK, AUTOMATED, W/O SCOPE: ICD-10-PCS | Mod: QW,S$GLB,, | Performed by: FAMILY MEDICINE

## 2019-12-23 PROCEDURE — 99203 OFFICE O/P NEW LOW 30 MIN: CPT | Mod: S$GLB,,, | Performed by: FAMILY MEDICINE

## 2019-12-23 PROCEDURE — 81003 URINALYSIS AUTO W/O SCOPE: CPT | Mod: QW,S$GLB,, | Performed by: FAMILY MEDICINE

## 2019-12-23 PROCEDURE — 87088 URINE BACTERIA CULTURE: CPT

## 2019-12-23 PROCEDURE — 87186 SC STD MICRODIL/AGAR DIL: CPT

## 2019-12-23 PROCEDURE — 99000 PR SPECIMEN HANDLING,DR OFF->LAB: ICD-10-PCS | Mod: S$GLB,,, | Performed by: FAMILY MEDICINE

## 2019-12-23 PROCEDURE — 99203 PR OFFICE/OUTPT VISIT, NEW, LEVL III, 30-44 MIN: ICD-10-PCS | Mod: S$GLB,,, | Performed by: FAMILY MEDICINE

## 2019-12-23 PROCEDURE — 87077 CULTURE AEROBIC IDENTIFY: CPT

## 2019-12-23 PROCEDURE — 99000 SPECIMEN HANDLING OFFICE-LAB: CPT | Mod: S$GLB,,, | Performed by: FAMILY MEDICINE

## 2019-12-23 PROCEDURE — 87086 URINE CULTURE/COLONY COUNT: CPT

## 2019-12-23 RX ORDER — NITROFURANTOIN 25; 75 MG/1; MG/1
100 CAPSULE ORAL 2 TIMES DAILY
Qty: 14 CAPSULE | Refills: 0 | Status: SHIPPED | OUTPATIENT
Start: 2019-12-23 | End: 2019-12-27

## 2019-12-23 NOTE — PATIENT INSTRUCTIONS

## 2019-12-23 NOTE — PROGRESS NOTES
"Subjective:       Patient ID: Estela Vázquez is a 54 y.o. female.    Vitals:  height is 5' 7" (1.702 m) and weight is 81.6 kg (180 lb). Her tympanic temperature is 99 °F (37.2 °C). Her blood pressure is 140/81 (abnormal) and her pulse is 66. Her respiration is 18 and oxygen saturation is 98%.     Chief Complaint: Dysuria    Patient present with uti symptoms started yesterday, dysuria, frequency, and urgency.  No fever or back pain.  Patient has not tried taking anything to help with symptoms.     Dysuria    The current episode started yesterday. The quality of the pain is described as burning and stabbing. The pain is at a severity of 5/10. Associated symptoms include chills, frequency and urgency. Pertinent negatives include no hematuria, nausea, vomiting, rash or withholding. She has tried nothing for the symptoms.       Constitution: Positive for chills. Negative for fever.   Neck: Negative for painful lymph nodes.   Gastrointestinal: Negative for abdominal pain, nausea and vomiting.   Genitourinary: Positive for dysuria, frequency and urgency. Negative for urine decreased, hematuria, history of kidney stones, painful menstruation, irregular menstruation, missed menses, heavy menstrual bleeding, ovarian cysts, genital trauma, vaginal pain, vaginal discharge, vaginal bleeding, vaginal odor, painful intercourse, genital sore, painful ejaculation and pelvic pain.   Musculoskeletal: Negative for back pain.   Skin: Negative for rash and lesion.   Hematologic/Lymphatic: Negative for swollen lymph nodes.       Objective:      Physical Exam   Constitutional: She is oriented to person, place, and time. Vital signs are normal. She appears well-developed and well-nourished. She is active and cooperative. No distress.   HENT:   Head: Normocephalic and atraumatic.   Nose: Nose normal.   Mouth/Throat: Oropharynx is clear and moist and mucous membranes are normal.   Eyes: Pupils are equal, round, and reactive to light. " Conjunctivae, EOM and lids are normal.   Neck: Trachea normal, normal range of motion, full passive range of motion without pain and phonation normal. Neck supple.   Cardiovascular: Normal rate, regular rhythm, normal heart sounds, intact distal pulses and normal pulses.   Pulmonary/Chest: Effort normal and breath sounds normal. No stridor. No respiratory distress. She has no wheezes. She has no rales.   Abdominal: Soft. Normal appearance and bowel sounds are normal. She exhibits no distension, no abdominal bruit, no pulsatile midline mass and no mass. There is no tenderness. There is no guarding.   No CVA pain.   Musculoskeletal: She exhibits no edema or deformity.   Lymphadenopathy:     She has no cervical adenopathy.   Neurological: She is alert and oriented to person, place, and time. She has normal strength and normal reflexes. No sensory deficit.   Skin: Skin is warm, dry, intact and not diaphoretic.   Psychiatric: She has a normal mood and affect. Her speech is normal and behavior is normal. Judgment and thought content normal. Cognition and memory are normal.   Nursing note and vitals reviewed.        Results for orders placed or performed in visit on 12/23/19   POCT Urinalysis, Dipstick, Automated, W/O Scope   Result Value Ref Range    POC Blood, Urine Negative Negative    POC Bilirubin, Urine Negative Negative    POC Urobilinogen, Urine normal 0.1 - 1.1    POC Ketones, Urine Negative Negative    POC Protein, Urine Negative Negative    POC Nitrates, Urine Negative Negative    POC Glucose, Urine Negative Negative    pH, UA 7.5 5 - 8    POC Specific Gravity, Urine 1.005 1.003 - 1.029    POC Leukocytes, Urine Positive (A) Negative     Assessment:       1. Urinary tract infection without hematuria, site unspecified    2. Dysuria        Plan:         Urinary tract infection without hematuria, site unspecified  -     nitrofurantoin, macrocrystal-monohydrate, (MACROBID) 100 MG capsule; Take 1 capsule (100 mg  total) by mouth 2 (two) times daily. for 7 days  Dispense: 14 capsule; Refill: 0    Dysuria  -     POCT Urinalysis, Dipstick, Automated, W/O Scope  -     Culture, Urine    WE WILL CALL YOU IN SEVERAL DAYS WITH URINE CULTURE RESULT.    Make sure that you follow up with your primary care doctor in the next 2-5 days if needed .  Return to the Urgent Care if signs or symptoms change and certainly if you have worsening symptoms go to the nearest emergency department for further evaluation.

## 2019-12-26 LAB — BACTERIA UR CULT: ABNORMAL

## 2019-12-27 ENCOUNTER — TELEPHONE (OUTPATIENT)
Dept: URGENT CARE | Facility: CLINIC | Age: 54
End: 2019-12-27

## 2019-12-27 DIAGNOSIS — R31.9 URINARY TRACT INFECTION WITH HEMATURIA, SITE UNSPECIFIED: Primary | ICD-10-CM

## 2019-12-27 DIAGNOSIS — N39.0 URINARY TRACT INFECTION WITHOUT HEMATURIA, SITE UNSPECIFIED: Primary | ICD-10-CM

## 2019-12-27 DIAGNOSIS — N39.0 URINARY TRACT INFECTION WITH HEMATURIA, SITE UNSPECIFIED: Primary | ICD-10-CM

## 2019-12-27 RX ORDER — SULFAMETHOXAZOLE AND TRIMETHOPRIM 800; 160 MG/1; MG/1
1 TABLET ORAL 2 TIMES DAILY
Qty: 14 TABLET | Refills: 0 | Status: SHIPPED | OUTPATIENT
Start: 2019-12-27 | End: 2020-01-03

## 2019-12-27 RX ORDER — CIPROFLOXACIN 500 MG/1
500 TABLET ORAL 2 TIMES DAILY
Qty: 14 TABLET | Refills: 0 | Status: SHIPPED | OUTPATIENT
Start: 2019-12-27 | End: 2019-12-27

## 2019-12-27 NOTE — TELEPHONE ENCOUNTER
Patient called in regards to new antibiotic and states she would rather bactrim. Bactrim is sensitive based of culture will send in bactrim.

## 2020-02-19 ENCOUNTER — APPOINTMENT (OUTPATIENT)
Dept: RADIOLOGY | Facility: OTHER | Age: 55
End: 2020-02-19
Attending: ORTHOPAEDIC SURGERY
Payer: COMMERCIAL

## 2020-02-19 ENCOUNTER — OFFICE VISIT (OUTPATIENT)
Dept: ORTHOPEDICS | Facility: CLINIC | Age: 55
End: 2020-02-19
Payer: COMMERCIAL

## 2020-02-19 VITALS
BODY MASS INDEX: 28.25 KG/M2 | DIASTOLIC BLOOD PRESSURE: 84 MMHG | HEIGHT: 67 IN | WEIGHT: 180 LBS | SYSTOLIC BLOOD PRESSURE: 118 MMHG

## 2020-02-19 DIAGNOSIS — M77.11 RIGHT LATERAL EPICONDYLITIS: ICD-10-CM

## 2020-02-19 DIAGNOSIS — M25.521 RIGHT ELBOW PAIN: Primary | ICD-10-CM

## 2020-02-19 DIAGNOSIS — M25.521 RIGHT ELBOW PAIN: ICD-10-CM

## 2020-02-19 DIAGNOSIS — M99.07 SOMATIC DYSFUNCTION OF UPPER EXTREMITY: ICD-10-CM

## 2020-02-19 PROCEDURE — 99214 PR OFFICE/OUTPT VISIT, EST, LEVL IV, 30-39 MIN: ICD-10-PCS | Mod: 25,S$GLB,, | Performed by: ORTHOPAEDIC SURGERY

## 2020-02-19 PROCEDURE — 73080 X-RAY EXAM OF ELBOW: CPT | Mod: 26,RT,, | Performed by: RADIOLOGY

## 2020-02-19 PROCEDURE — 3008F BODY MASS INDEX DOCD: CPT | Mod: CPTII,S$GLB,, | Performed by: ORTHOPAEDIC SURGERY

## 2020-02-19 PROCEDURE — 98925 OSTEOPATH MANJ 1-2 REGIONS: CPT | Mod: S$GLB,,, | Performed by: ORTHOPAEDIC SURGERY

## 2020-02-19 PROCEDURE — 3008F PR BODY MASS INDEX (BMI) DOCUMENTED: ICD-10-PCS | Mod: CPTII,S$GLB,, | Performed by: ORTHOPAEDIC SURGERY

## 2020-02-19 PROCEDURE — 98925 PR OSTEOPATHIC MANIP,1-2 BODY REGN: ICD-10-PCS | Mod: S$GLB,,, | Performed by: ORTHOPAEDIC SURGERY

## 2020-02-19 PROCEDURE — 99214 OFFICE O/P EST MOD 30 MIN: CPT | Mod: 25,S$GLB,, | Performed by: ORTHOPAEDIC SURGERY

## 2020-02-19 PROCEDURE — 73080 X-RAY EXAM OF ELBOW: CPT | Mod: TC,RT

## 2020-02-19 PROCEDURE — 99999 PR PBB SHADOW E&M-EST. PATIENT-LVL III: ICD-10-PCS | Mod: PBBFAC,,, | Performed by: ORTHOPAEDIC SURGERY

## 2020-02-19 PROCEDURE — 73080 XR ELBOW COMPLETE 3 VIEW RIGHT: ICD-10-PCS | Mod: 26,RT,, | Performed by: RADIOLOGY

## 2020-02-19 PROCEDURE — 99999 PR PBB SHADOW E&M-EST. PATIENT-LVL III: CPT | Mod: PBBFAC,,, | Performed by: ORTHOPAEDIC SURGERY

## 2020-02-19 RX ORDER — MELOXICAM 15 MG/1
15 TABLET ORAL DAILY
Qty: 30 TABLET | Refills: 2 | Status: SHIPPED | OUTPATIENT
Start: 2020-02-19 | End: 2021-03-03

## 2020-02-19 NOTE — PROGRESS NOTES
CC: right elbow pain    54 y.o. Female presents today for evaluation of her right elbow pain. Patient admits to right lateral elbow pain for the past 3-4 weeks without known mechanism of injury. She denies prior history of elbow injury. When asked where her pain is located she gestures the lateral aspect of her elbow and describes the quality of her pain as sharp and aching. She also admits to her pain radiating over the wrist extensor muscles and into her third through fifth digits. Patient states she has also been experiencing numbness and tingling to her third through fifth digits in addition to decreased  strength. She admits to this problem waking her up from her sleep. Patient admits to attending formal physical therapy for a prior left shoulder surgery. She states her physical therapist has been encouraging her stretch her forearm muscles, but she does not believe this has been helping to improve her pain. She denies recent falls or trauma.   How long: Patient reports she has been experiencing right elbow pain for approximately 3-4 weeks.   What makes it better: Patient reports improved pain with rest.   What makes it worse: Patient reports increased pain with all motions at the elbow and with trying to lift using her right hand.   Does it radiate: Patient admits to her pain radiating distally to her third-fourth digits.  Attempted treatments: Patient admits to taking ibuprofen as needed in addition to stretching her forearm musculature. She denies formal physical therapy. Denies prior history of surgery or injection to either elbow. Denies bracing. Denies topical creams or gels.   Pain score: 3/10  Any mechanical symptoms: Patient denies mechanical symptoms.  Feelings of instability: Patient denies feelings of instability.   Problems with ADLs: Patient admits to this problem affecting her ability to perform ADLs.     REVIEW OF SYSTEMS:   Constitution: Patient denies fever, chills, night sweats, and  weight changes.  Eyes: Patient denies eye pain or vision changes.  HENT: Patient denies headache, ear pain, sore throat, or nasal discharge.  CVS: Patient denies chest pain.  Lungs: Patient denies shortness of breath or cough.  Abd: Patient denies stomach pain, nausea, or vomiting.  Skin: Patient denies skin rash or itching.    Hematologic/Lymphatic: Patient denies easy bruising.   Musculoskeletal: Patient denies recent falls. See HPI.  Psych: Patient denies any current anxiety or nervousness.    PAST MEDICAL HISTORY:   Past Medical History:   Diagnosis Date    Arrhythmia     bigeminy & trigeminy     followed cardiologist    Pulmonary embolism     Bilateral       PAST SURGICAL HISTORY:   Past Surgical History:   Procedure Laterality Date    APPENDECTOMY       SECTION N/A     HYSTERECTOMY Bilateral     LAPAROSCOPY W/ MINI-LAPAROTOMY Right     SHOULDER ARTHROSCOPY         FAMILY HISTORY:   History reviewed. No pertinent family history.    SOCIAL HISTORY:   Social History     Socioeconomic History    Marital status:      Spouse name: Not on file    Number of children: Not on file    Years of education: Not on file    Highest education level: Not on file   Occupational History    Not on file   Social Needs    Financial resource strain: Not on file    Food insecurity:     Worry: Not on file     Inability: Not on file    Transportation needs:     Medical: Not on file     Non-medical: Not on file   Tobacco Use    Smoking status: Never Smoker    Smokeless tobacco: Never Used   Substance and Sexual Activity    Alcohol use: Yes     Comment: socially    Drug use: Not on file    Sexual activity: Not on file   Lifestyle    Physical activity:     Days per week: Not on file     Minutes per session: Not on file    Stress: Not on file   Relationships    Social connections:     Talks on phone: Not on file     Gets together: Not on file     Attends Islam service: Not on file     Active member  "of club or organization: Not on file     Attends meetings of clubs or organizations: Not on file     Relationship status: Not on file   Other Topics Concern    Not on file   Social History Narrative    Not on file       MEDICATIONS:     Current Outpatient Medications:     HYDROcodone-acetaminophen (NORCO)  mg per tablet, Take 1 tablet by mouth every 4-6 hours for pain., Disp: 28 tablet, Rfl: 0    losartan (COZAAR) 25 MG tablet, Take 25 mg by mouth every evening. , Disp: , Rfl:     meloxicam (MOBIC) 15 MG tablet, Take 1 tablet (15 mg total) by mouth once daily., Disp: 30 tablet, Rfl: 2    omeprazole (PRILOSEC) 20 MG capsule, Take 1 capsule (20 mg total) by mouth once daily. To protect your stomach., Disp: 25 capsule, Rfl: 1    promethazine (PHENERGAN) 25 MG tablet, Take 1 tablet (25 mg total) by mouth every 6 (six) hours as needed for Nausea., Disp: 16 tablet, Rfl: 0    traMADol (ULTRAM) 50 mg tablet, Take 1-2 tablets ( mg total) by mouth every 6 (six) hours as needed (surgical pain)., Disp: 28 tablet, Rfl: 0    ALLERGIES:   Review of patient's allergies indicates:   Allergen Reactions    Morphine Itching    Penicillins Rash        PHYSICAL EXAMINATION:  /84   Ht 5' 7" (1.702 m)   Wt 81.6 kg (180 lb)   BMI 28.19 kg/m²   Vitals signs and nursing note have been reviewed.  General: In no acute distress, well developed, well nourished, no diaphoresis  Eyes: EOM full and smooth, no eye redness or discharge  HENT: normocephalic and atraumatic, neck supple, trachea midline, no nasal discharge, no external ear redness or discharge  Cardiovascular: no LE edema  Lungs: respirations non-labored, no conversational dyspnea   Abd: non-distended, no rigidity  MSK: no amputation or deformity, no swelling of extremities  Neuro: AAOx3, CN2-12 grossly intact  Skin: No rashes, warm and dry  Psychiatric: cooperative, pleasant, mood and affect appropriate for age    Elbow: RIGHT   The affected elbow is " compared to the contralateral elbow.    Observation:    There is no edema, erythema, or ecchymosis.  There is no obvious muscle atrophy, hypertonicity, or hypotonicity of arm muscles.  There is no abnormal carrying angle or gunstock deformity noted.    ROM:  Active flexion to 150° on left and 150° on right.    Active extension to 0° on left and 0° on right without hyperextension.   Active pronation to 80° on left and 80° on right.    Active supination to 80° on left and 80° on right.    Active radial deviation to 20° on left and 20° on right.    Active ulnar deviation to 30° on left and 30° on right.    Tenderness To Palpation:  + minimal tenderness at the medial epicondyle   + moderate tenderness at the lateral epicondyle   Moderate tenderness over the distal aspect of the right upper arm at the triceps  No tenderness at the olecranon.  No tenderness at the distal humerus or proximal radius/ulna.  No tenderness at the radial head.  No tenderness over the ulnar and radial collateral ligaments.  No tenderness over the posterior interosseous nerve or distal biceps tendon.    Strength Testing:  Deltoid - 5/5 on left and 5/5 on right  Biceps - 5/5 on left and 5/5 on right  Triceps - 5/5 on left and 5/5 on right  Wrist extension - 5/5 on left and 5/5 on right  Wrist flexion - 5/5 on left and 5/5 on right   - 5/5 on left and 5/5 on right  Finger extension - 5/5 on left and 5/5 on right  Finger abduction - 5/5 on left and 5/5 on right    Special Tests:  No laxity of the MCL at 0 and 30 degrees.    Milking maneuver - negative  No laxity of the LCL at 0 and 30 degrees.  No laxity with posterolateral and posteromedial rotary testing.    3rd digit extension resistance test - positive  Resisted supination - positive  Resisted pronation - negative  Resisted wrist extension (Cozen's test) - positive  Resisted wrist flexion - negative    Myofascial restriction at the right elbow  Fascial herniated trigger point at distal  humerus just lateral to the triceps tendon on the right  Fascial trigger band from lateral epicondyle to distal third of dorsal forearm on the right    Neurovascular Exam:  2+ radial pulses BL.  Sensation to light touch intact in the distal median, radial, and ulnar nerve distributions bilaterally.  Negative Tinnels at carpal tunnel.  Negative Tinnels at cubital tunnel.  2+/4 reflexes at triceps, biceps, and brachioradialis.    IMAGIN. X-ray ordered due to right elbow pain   2. X-ray images were reviewed personally by me and then directly with patient.  3. FINDINGS: X-ray images obtained demonstrate no cortical irregularities, sclerosis, osteophyte formation, or subchonral cysts. There is no joint space narrowing. No fracture.  4. IMPRESSION: No pathology or irregularities appreciated.       ASSESSMENT:      ICD-10-CM ICD-9-CM   1. Right elbow pain M25.521 719.42   2. Right lateral epicondylitis M77.11 726.32   3. Somatic dysfunction of upper extremity M99.07 739.7       PLAN:  1-2.  Right elbow pain/right lateral epicondylitis - new issues to provider    - Estela admits to right elbow pain for the past three to four weeks without known mechanism of injury. Patient indicates her pain affects the lateral structures of her elbow. She also admits to experiencing decreased  strength and numbness and tingling affecting the third through fifth digits.     - XRs ordered in the office today and images were personally reviewed with the patient. See above for further detail.    - Based on her description/body language of pain and somatic dysfunction identified on exam, I discussed osteopathic manipulation as a treatment option today.  She consents to evaluation and treatment.  See below.    - HEP for epicondylitis prescribed today. Handout provided, explained, and exercises were demonstrated as needed. Encouraged to do daily.    - Mobic 15 mg daily for 2 weeks followed by his needed. I also recommended consistent  icing to the affected elbow at 2-3 times daily for 15-20 minutes at a time.      3.  Somatic dysfunction of upper extremity region -     - OMT 1-2 regions. Oral consent obtained. Reviewed benefits and potential side effects. OMT indicated today due to signs and symptoms as well as local and remote somatic dysfunction findings and their related neurokinetic, lymphatic, fascial and/or arteriovenous body connections. OMT techniques used: Myofascial Release and Fascial Distortion Model. Treatment was tolerated well. Improvement noted in segmental mobility post-treatment in dysfunctional regions. There were no adverse events and no complications immediately following treatment. Advised plenty of water to help alleviate soreness.      Future planning includes - possibly more OMT if helpful and if indicated, formal PT    All questions were answered to the best of my ability and all concerns were addressed at this time.    Follow up as needed.       This note is dictated using the M*Modal Fluency Direct word recognition program. There are word recognition mistakes that are occasionally missed on review.

## 2020-12-02 ENCOUNTER — OFFICE VISIT (OUTPATIENT)
Dept: SPORTS MEDICINE | Facility: CLINIC | Age: 55
End: 2020-12-02
Payer: COMMERCIAL

## 2020-12-02 VITALS — WEIGHT: 180 LBS | BODY MASS INDEX: 28.25 KG/M2 | HEIGHT: 67 IN

## 2020-12-02 DIAGNOSIS — M25.552 LEFT HIP PAIN: Primary | ICD-10-CM

## 2020-12-02 PROCEDURE — 3008F BODY MASS INDEX DOCD: CPT | Mod: CPTII,S$GLB,, | Performed by: ORTHOPAEDIC SURGERY

## 2020-12-02 PROCEDURE — 1126F PR PAIN SEVERITY QUANTIFIED, NO PAIN PRESENT: ICD-10-PCS | Mod: S$GLB,,, | Performed by: ORTHOPAEDIC SURGERY

## 2020-12-02 PROCEDURE — 99999 PR PBB SHADOW E&M-EST. PATIENT-LVL III: CPT | Mod: PBBFAC,,, | Performed by: ORTHOPAEDIC SURGERY

## 2020-12-02 PROCEDURE — 99999 PR PBB SHADOW E&M-EST. PATIENT-LVL III: ICD-10-PCS | Mod: PBBFAC,,, | Performed by: ORTHOPAEDIC SURGERY

## 2020-12-02 PROCEDURE — 99214 OFFICE O/P EST MOD 30 MIN: CPT | Mod: S$GLB,,, | Performed by: ORTHOPAEDIC SURGERY

## 2020-12-02 PROCEDURE — 1126F AMNT PAIN NOTED NONE PRSNT: CPT | Mod: S$GLB,,, | Performed by: ORTHOPAEDIC SURGERY

## 2020-12-02 PROCEDURE — 3008F PR BODY MASS INDEX (BMI) DOCUMENTED: ICD-10-PCS | Mod: CPTII,S$GLB,, | Performed by: ORTHOPAEDIC SURGERY

## 2020-12-02 PROCEDURE — 99214 PR OFFICE/OUTPT VISIT, EST, LEVL IV, 30-39 MIN: ICD-10-PCS | Mod: S$GLB,,, | Performed by: ORTHOPAEDIC SURGERY

## 2020-12-02 NOTE — PROGRESS NOTES
CC left hip pain    HISTORY OF PRESENT ILLNESS:   Pt is here today for followup of her hip trochanteric bursectomy.  she is doing much better than at her last visit.  We have reviewed her findings and discussed plan of care and future treatment options.        Patient notes that she is unable to lay on her left side at night and it wakes her at times   She notes pain with getting out of a chair/in and out of the car, prolonged sitting     4-5 months of worsening pain   No inciting event or new injury     She describes her pain as lateral     She notes doing her HEP a few times a week and uses a roller       preop SANE 50    DATE OF PROCEDURE: 11/15/2018  PROCEDURE:    Left:  1. Hip arthroscopic trochanteric bursectomy   2. Hip gluteal sling release (CPT 15175)    Review of Systems   Constitution: Negative. Negative for chills, fever and night sweats.   HENT: Negative for congestion and headaches.    Eyes: Negative for blurred vision, left vision loss and right vision loss.   Cardiovascular: Negative for chest pain and syncope.   Respiratory: Negative for cough and shortness of breath.    Endocrine: Negative for polydipsia, polyphagia and polyuria.   Hematologic/Lymphatic: Negative for bleeding problem. Does not bruise/bleed easily.   Skin: Negative for dry skin, itching and rash.   Musculoskeletal: Negative for falls and muscle weakness.   Gastrointestinal: Negative for abdominal pain and bowel incontinence.   Genitourinary: Negative for bladder incontinence and nocturia.   Neurological: Negative for disturbances in coordination, loss of balance and seizures.   Psychiatric/Behavioral: Negative for depression. The patient does not have insomnia.    Allergic/Immunologic: Negative for hives and persistent infections.       PAST MEDICAL HISTORY:   Past Medical History:   Diagnosis Date    Arrhythmia     bigeminy & trigeminy     followed cardiologist    Pulmonary embolism     Bilateral     PAST SURGICAL HISTORY:   Past  Surgical History:   Procedure Laterality Date    APPENDECTOMY       SECTION N/A     HYSTERECTOMY Bilateral     LAPAROSCOPY W/ MINI-LAPAROTOMY Right     SHOULDER ARTHROSCOPY       FAMILY HISTORY: No family history on file.  SOCIAL HISTORY:   Social History     Socioeconomic History    Marital status:      Spouse name: Not on file    Number of children: Not on file    Years of education: Not on file    Highest education level: Not on file   Occupational History    Not on file   Social Needs    Financial resource strain: Not on file    Food insecurity     Worry: Not on file     Inability: Not on file    Transportation needs     Medical: Not on file     Non-medical: Not on file   Tobacco Use    Smoking status: Never Smoker    Smokeless tobacco: Never Used   Substance and Sexual Activity    Alcohol use: Yes     Comment: socially    Drug use: Not on file    Sexual activity: Not on file   Lifestyle    Physical activity     Days per week: Not on file     Minutes per session: Not on file    Stress: Not on file   Relationships    Social connections     Talks on phone: Not on file     Gets together: Not on file     Attends Worship service: Not on file     Active member of club or organization: Not on file     Attends meetings of clubs or organizations: Not on file     Relationship status: Not on file   Other Topics Concern    Not on file   Social History Narrative    Not on file       MEDICATIONS:   Current Outpatient Medications:     losartan (COZAAR) 25 MG tablet, Take 25 mg by mouth every evening. , Disp: , Rfl:     meloxicam (MOBIC) 15 MG tablet, Take 1 tablet (15 mg total) by mouth once daily., Disp: 30 tablet, Rfl: 2    HYDROcodone-acetaminophen (NORCO)  mg per tablet, Take 1 tablet by mouth every 4-6 hours for pain., Disp: 28 tablet, Rfl: 0    omeprazole (PRILOSEC) 20 MG capsule, Take 1 capsule (20 mg total) by mouth once daily. To protect your stomach., Disp: 25  "capsule, Rfl: 1    promethazine (PHENERGAN) 25 MG tablet, Take 1 tablet (25 mg total) by mouth every 6 (six) hours as needed for Nausea., Disp: 16 tablet, Rfl: 0    traMADol (ULTRAM) 50 mg tablet, Take 1-2 tablets ( mg total) by mouth every 6 (six) hours as needed (surgical pain)., Disp: 28 tablet, Rfl: 0  ALLERGIES:   Review of patient's allergies indicates:   Allergen Reactions    Morphine Itching    Penicillins Rash       VITAL SIGNS: Ht 5' 7" (1.702 m)   Wt 81.6 kg (180 lb)   BMI 28.19 kg/m²                                                                                   PHYSICAL EXAMINATION:     Incision sites healed well  No evidence of any erythema, infection or induration  Flexion ROM 0-90 degrees   No effusion  2+ DP pulse  No swelling, no calf tenderness  - Laura's sign    + tenderness IT band (particularly proximal), + hip flexor  Tightness of IT band and hip flexor   Pain with resisted hip abduction   No pain with resisted hip flexion     5/5 abduction strength without pain    Decreased glute activation (no activation)                                                            ASSESSMENT:                                                                                                                                               1. Status post above.        2. Weak hip and core and hip flexor and IT band tightness                                                                                                                          PLAN:                                                                                                                                                     1. Physical therapy   2. Emphasized core function.  3. I have discussed return to activity in detail.  4. she will see us back in 3 months with hip form.   5. All questions were answered and she should contact us if she has any questions or concerns in the interim.                             "

## 2020-12-21 ENCOUNTER — CLINICAL SUPPORT (OUTPATIENT)
Dept: REHABILITATION | Facility: HOSPITAL | Age: 55
End: 2020-12-21
Attending: ORTHOPAEDIC SURGERY
Payer: COMMERCIAL

## 2020-12-21 DIAGNOSIS — M62.81 MUSCLE WEAKNESS OF LOWER EXTREMITY: ICD-10-CM

## 2020-12-21 DIAGNOSIS — M25.552 LEFT HIP PAIN: ICD-10-CM

## 2020-12-21 DIAGNOSIS — R26.9 GAIT DIFFICULTY: ICD-10-CM

## 2020-12-21 DIAGNOSIS — M25.552 ACUTE HIP PAIN, LEFT: Primary | ICD-10-CM

## 2020-12-21 PROCEDURE — 97110 THERAPEUTIC EXERCISES: CPT

## 2020-12-21 PROCEDURE — 97161 PT EVAL LOW COMPLEX 20 MIN: CPT

## 2021-01-06 ENCOUNTER — PATIENT MESSAGE (OUTPATIENT)
Dept: REHABILITATION | Facility: HOSPITAL | Age: 56
End: 2021-01-06

## 2021-01-21 ENCOUNTER — CLINICAL SUPPORT (OUTPATIENT)
Dept: REHABILITATION | Facility: HOSPITAL | Age: 56
End: 2021-01-21
Attending: ORTHOPAEDIC SURGERY
Payer: COMMERCIAL

## 2021-01-21 DIAGNOSIS — M25.552 LEFT HIP PAIN: ICD-10-CM

## 2021-01-21 PROCEDURE — 97110 THERAPEUTIC EXERCISES: CPT

## 2021-01-28 ENCOUNTER — CLINICAL SUPPORT (OUTPATIENT)
Dept: REHABILITATION | Facility: HOSPITAL | Age: 56
End: 2021-01-28
Attending: ORTHOPAEDIC SURGERY
Payer: COMMERCIAL

## 2021-01-28 DIAGNOSIS — M25.552 LEFT HIP PAIN: ICD-10-CM

## 2021-01-28 PROCEDURE — 97110 THERAPEUTIC EXERCISES: CPT

## 2021-02-04 ENCOUNTER — CLINICAL SUPPORT (OUTPATIENT)
Dept: REHABILITATION | Facility: HOSPITAL | Age: 56
End: 2021-02-04
Attending: ORTHOPAEDIC SURGERY
Payer: COMMERCIAL

## 2021-02-04 DIAGNOSIS — M25.552 LEFT HIP PAIN: ICD-10-CM

## 2021-02-04 PROCEDURE — 97110 THERAPEUTIC EXERCISES: CPT

## 2021-02-22 ENCOUNTER — CLINICAL SUPPORT (OUTPATIENT)
Dept: REHABILITATION | Facility: HOSPITAL | Age: 56
End: 2021-02-22
Attending: ORTHOPAEDIC SURGERY
Payer: COMMERCIAL

## 2021-02-22 DIAGNOSIS — M25.552 LEFT HIP PAIN: ICD-10-CM

## 2021-02-22 PROCEDURE — 97110 THERAPEUTIC EXERCISES: CPT

## 2021-03-01 ENCOUNTER — CLINICAL SUPPORT (OUTPATIENT)
Dept: REHABILITATION | Facility: HOSPITAL | Age: 56
End: 2021-03-01
Attending: ORTHOPAEDIC SURGERY
Payer: COMMERCIAL

## 2021-03-01 DIAGNOSIS — M25.552 LEFT HIP PAIN: ICD-10-CM

## 2021-03-01 PROCEDURE — 97110 THERAPEUTIC EXERCISES: CPT

## 2021-03-03 ENCOUNTER — OFFICE VISIT (OUTPATIENT)
Dept: SPORTS MEDICINE | Facility: CLINIC | Age: 56
End: 2021-03-03
Payer: COMMERCIAL

## 2021-03-03 VITALS
HEART RATE: 80 BPM | DIASTOLIC BLOOD PRESSURE: 68 MMHG | WEIGHT: 180 LBS | SYSTOLIC BLOOD PRESSURE: 101 MMHG | BODY MASS INDEX: 28.25 KG/M2 | HEIGHT: 67 IN

## 2021-03-03 DIAGNOSIS — M25.552 LEFT HIP PAIN: Primary | ICD-10-CM

## 2021-03-03 DIAGNOSIS — M25.552 LATERAL PAIN OF LEFT HIP: ICD-10-CM

## 2021-03-03 PROCEDURE — 99999 PR PBB SHADOW E&M-EST. PATIENT-LVL III: ICD-10-PCS | Mod: PBBFAC,,, | Performed by: ORTHOPAEDIC SURGERY

## 2021-03-03 PROCEDURE — 3008F BODY MASS INDEX DOCD: CPT | Mod: CPTII,S$GLB,, | Performed by: ORTHOPAEDIC SURGERY

## 2021-03-03 PROCEDURE — 1126F AMNT PAIN NOTED NONE PRSNT: CPT | Mod: S$GLB,,, | Performed by: ORTHOPAEDIC SURGERY

## 2021-03-03 PROCEDURE — 99214 OFFICE O/P EST MOD 30 MIN: CPT | Mod: S$GLB,,, | Performed by: ORTHOPAEDIC SURGERY

## 2021-03-03 PROCEDURE — 3008F PR BODY MASS INDEX (BMI) DOCUMENTED: ICD-10-PCS | Mod: CPTII,S$GLB,, | Performed by: ORTHOPAEDIC SURGERY

## 2021-03-03 PROCEDURE — 1126F PR PAIN SEVERITY QUANTIFIED, NO PAIN PRESENT: ICD-10-PCS | Mod: S$GLB,,, | Performed by: ORTHOPAEDIC SURGERY

## 2021-03-03 PROCEDURE — 99999 PR PBB SHADOW E&M-EST. PATIENT-LVL III: CPT | Mod: PBBFAC,,, | Performed by: ORTHOPAEDIC SURGERY

## 2021-03-03 PROCEDURE — 99214 PR OFFICE/OUTPT VISIT, EST, LEVL IV, 30-39 MIN: ICD-10-PCS | Mod: S$GLB,,, | Performed by: ORTHOPAEDIC SURGERY

## 2021-03-03 RX ORDER — MELOXICAM 15 MG/1
15 TABLET ORAL DAILY
Qty: 30 TABLET | Refills: 3 | Status: SHIPPED | OUTPATIENT
Start: 2021-03-03

## 2021-03-06 ENCOUNTER — IMMUNIZATION (OUTPATIENT)
Dept: PRIMARY CARE CLINIC | Facility: CLINIC | Age: 56
End: 2021-03-06
Payer: COMMERCIAL

## 2021-03-06 DIAGNOSIS — Z23 NEED FOR VACCINATION: Primary | ICD-10-CM

## 2021-03-06 PROCEDURE — 91300 COVID-19, MRNA, LNP-S, PF, 30 MCG/0.3 ML DOSE VACCINE: CPT | Mod: PBBFAC | Performed by: INTERNAL MEDICINE

## 2021-03-08 ENCOUNTER — CLINICAL SUPPORT (OUTPATIENT)
Dept: REHABILITATION | Facility: HOSPITAL | Age: 56
End: 2021-03-08
Attending: ORTHOPAEDIC SURGERY
Payer: COMMERCIAL

## 2021-03-08 DIAGNOSIS — M25.552 LEFT HIP PAIN: ICD-10-CM

## 2021-03-08 PROCEDURE — 97110 THERAPEUTIC EXERCISES: CPT

## 2021-03-10 ENCOUNTER — CLINICAL SUPPORT (OUTPATIENT)
Dept: SPORTS MEDICINE | Facility: CLINIC | Age: 56
End: 2021-03-10
Payer: COMMERCIAL

## 2021-03-10 VITALS
DIASTOLIC BLOOD PRESSURE: 72 MMHG | SYSTOLIC BLOOD PRESSURE: 118 MMHG | WEIGHT: 180 LBS | BODY MASS INDEX: 28.25 KG/M2 | HEIGHT: 67 IN

## 2021-03-10 DIAGNOSIS — M25.552 LEFT HIP PAIN: Primary | ICD-10-CM

## 2021-03-10 DIAGNOSIS — M79.18 PIRIFORMIS MUSCLE PAIN: ICD-10-CM

## 2021-03-10 PROCEDURE — 99499 NO LOS: ICD-10-PCS | Mod: S$GLB,,, | Performed by: ORTHOPAEDIC SURGERY

## 2021-03-10 PROCEDURE — 20552 PR INJECT TRIGGER POINT, 1 OR 2: ICD-10-PCS | Mod: LT,S$GLB,, | Performed by: ORTHOPAEDIC SURGERY

## 2021-03-10 PROCEDURE — 99999 PR PBB SHADOW E&M-EST. PATIENT-LVL II: ICD-10-PCS | Mod: PBBFAC,,, | Performed by: ORTHOPAEDIC SURGERY

## 2021-03-10 PROCEDURE — 99499 UNLISTED E&M SERVICE: CPT | Mod: S$GLB,,, | Performed by: ORTHOPAEDIC SURGERY

## 2021-03-10 PROCEDURE — 76942 ECHO GUIDE FOR BIOPSY: CPT | Mod: S$GLB,,, | Performed by: ORTHOPAEDIC SURGERY

## 2021-03-10 PROCEDURE — 99999 PR PBB SHADOW E&M-EST. PATIENT-LVL II: CPT | Mod: PBBFAC,,, | Performed by: ORTHOPAEDIC SURGERY

## 2021-03-10 PROCEDURE — 20552 NJX 1/MLT TRIGGER POINT 1/2: CPT | Mod: LT,S$GLB,, | Performed by: ORTHOPAEDIC SURGERY

## 2021-03-10 PROCEDURE — 76942 PR U/S GUIDANCE FOR NEEDLE GUIDANCE: ICD-10-PCS | Mod: S$GLB,,, | Performed by: ORTHOPAEDIC SURGERY

## 2021-03-10 RX ORDER — TRIAMCINOLONE ACETONIDE 40 MG/ML
40 INJECTION, SUSPENSION INTRA-ARTICULAR; INTRAMUSCULAR
Status: DISCONTINUED | OUTPATIENT
Start: 2021-03-10 | End: 2021-06-09

## 2021-03-22 ENCOUNTER — CLINICAL SUPPORT (OUTPATIENT)
Dept: REHABILITATION | Facility: HOSPITAL | Age: 56
End: 2021-03-22
Attending: ORTHOPAEDIC SURGERY
Payer: COMMERCIAL

## 2021-03-22 DIAGNOSIS — M25.552 LEFT HIP PAIN: ICD-10-CM

## 2021-03-22 PROCEDURE — 97110 THERAPEUTIC EXERCISES: CPT

## 2021-03-27 ENCOUNTER — IMMUNIZATION (OUTPATIENT)
Dept: PRIMARY CARE CLINIC | Facility: CLINIC | Age: 56
End: 2021-03-27
Payer: COMMERCIAL

## 2021-03-27 DIAGNOSIS — Z23 NEED FOR VACCINATION: Primary | ICD-10-CM

## 2021-03-27 PROCEDURE — 0002A COVID-19, MRNA, LNP-S, PF, 30 MCG/0.3 ML DOSE VACCINE: CPT | Mod: PBBFAC | Performed by: INTERNAL MEDICINE

## 2021-03-27 PROCEDURE — 91300 COVID-19, MRNA, LNP-S, PF, 30 MCG/0.3 ML DOSE VACCINE: CPT | Mod: PBBFAC | Performed by: INTERNAL MEDICINE

## 2021-06-02 ENCOUNTER — OFFICE VISIT (OUTPATIENT)
Dept: OPTOMETRY | Facility: CLINIC | Age: 56
End: 2021-06-02
Payer: COMMERCIAL

## 2021-06-02 ENCOUNTER — OFFICE VISIT (OUTPATIENT)
Dept: SPORTS MEDICINE | Facility: CLINIC | Age: 56
End: 2021-06-02
Payer: COMMERCIAL

## 2021-06-02 VITALS — WEIGHT: 176 LBS | BODY MASS INDEX: 27.62 KG/M2 | HEIGHT: 67 IN

## 2021-06-02 DIAGNOSIS — H25.13 NS (NUCLEAR SCLEROSIS), BILATERAL: ICD-10-CM

## 2021-06-02 DIAGNOSIS — H52.4 PRESBYOPIA: ICD-10-CM

## 2021-06-02 DIAGNOSIS — H10.13 CONJUNCTIVITIS, ALLERGIC, BILATERAL: Primary | ICD-10-CM

## 2021-06-02 DIAGNOSIS — M25.552 LEFT HIP PAIN: Primary | ICD-10-CM

## 2021-06-02 PROCEDURE — 3008F BODY MASS INDEX DOCD: CPT | Mod: CPTII,S$GLB,, | Performed by: ORTHOPAEDIC SURGERY

## 2021-06-02 PROCEDURE — 92015 PR REFRACTION: ICD-10-PCS | Mod: S$GLB,,, | Performed by: OPTOMETRIST

## 2021-06-02 PROCEDURE — 92015 DETERMINE REFRACTIVE STATE: CPT | Mod: S$GLB,,, | Performed by: OPTOMETRIST

## 2021-06-02 PROCEDURE — 99214 OFFICE O/P EST MOD 30 MIN: CPT | Mod: S$GLB,,, | Performed by: ORTHOPAEDIC SURGERY

## 2021-06-02 PROCEDURE — 99999 PR PBB SHADOW E&M-EST. PATIENT-LVL II: ICD-10-PCS | Mod: PBBFAC,,, | Performed by: OPTOMETRIST

## 2021-06-02 PROCEDURE — 1126F PR PAIN SEVERITY QUANTIFIED, NO PAIN PRESENT: ICD-10-PCS | Mod: S$GLB,,, | Performed by: OPTOMETRIST

## 2021-06-02 PROCEDURE — 99999 PR PBB SHADOW E&M-EST. PATIENT-LVL III: CPT | Mod: PBBFAC,,, | Performed by: ORTHOPAEDIC SURGERY

## 2021-06-02 PROCEDURE — 1126F AMNT PAIN NOTED NONE PRSNT: CPT | Mod: S$GLB,,, | Performed by: ORTHOPAEDIC SURGERY

## 2021-06-02 PROCEDURE — 99214 PR OFFICE/OUTPT VISIT, EST, LEVL IV, 30-39 MIN: ICD-10-PCS | Mod: S$GLB,,, | Performed by: ORTHOPAEDIC SURGERY

## 2021-06-02 PROCEDURE — 1126F AMNT PAIN NOTED NONE PRSNT: CPT | Mod: S$GLB,,, | Performed by: OPTOMETRIST

## 2021-06-02 PROCEDURE — 1126F PR PAIN SEVERITY QUANTIFIED, NO PAIN PRESENT: ICD-10-PCS | Mod: S$GLB,,, | Performed by: ORTHOPAEDIC SURGERY

## 2021-06-02 PROCEDURE — 92004 COMPRE OPH EXAM NEW PT 1/>: CPT | Mod: S$GLB,,, | Performed by: OPTOMETRIST

## 2021-06-02 PROCEDURE — 92004 PR EYE EXAM, NEW PATIENT,COMPREHESV: ICD-10-PCS | Mod: S$GLB,,, | Performed by: OPTOMETRIST

## 2021-06-02 PROCEDURE — 3008F PR BODY MASS INDEX (BMI) DOCUMENTED: ICD-10-PCS | Mod: CPTII,S$GLB,, | Performed by: ORTHOPAEDIC SURGERY

## 2021-06-02 PROCEDURE — 99999 PR PBB SHADOW E&M-EST. PATIENT-LVL II: CPT | Mod: PBBFAC,,, | Performed by: OPTOMETRIST

## 2021-06-02 PROCEDURE — 99999 PR PBB SHADOW E&M-EST. PATIENT-LVL III: ICD-10-PCS | Mod: PBBFAC,,, | Performed by: ORTHOPAEDIC SURGERY

## 2021-06-02 RX ORDER — CIPROFLOXACIN 500 MG/1
TABLET ORAL
COMMUNITY
Start: 2021-05-12 | End: 2021-06-21

## 2021-06-02 RX ORDER — METRONIDAZOLE 500 MG/1
500 TABLET ORAL EVERY 8 HOURS
COMMUNITY
Start: 2021-05-12 | End: 2022-10-27

## 2021-06-02 RX ORDER — AMOXICILLIN 500 MG
2 CAPSULE ORAL
COMMUNITY

## 2021-06-02 RX ORDER — DICYCLOMINE HYDROCHLORIDE 10 MG/1
10 CAPSULE ORAL EVERY 6 HOURS PRN
COMMUNITY
Start: 2021-05-18

## 2021-06-02 RX ORDER — ESTRADIOL 0.1 MG/G
CREAM VAGINAL
COMMUNITY

## 2021-06-02 RX ORDER — FLUTICASONE PROPIONATE 50 MCG
SPRAY, SUSPENSION (ML) NASAL
COMMUNITY

## 2021-06-02 RX ORDER — IBUPROFEN 600 MG/1
TABLET ORAL
COMMUNITY
End: 2022-10-27

## 2021-06-02 RX ORDER — FAMOTIDINE 20 MG/1
20 TABLET, FILM COATED ORAL 2 TIMES DAILY
COMMUNITY
Start: 2021-05-18 | End: 2022-10-27

## 2021-06-02 RX ORDER — ONDANSETRON 4 MG/1
TABLET, ORALLY DISINTEGRATING ORAL
COMMUNITY
Start: 2021-05-18 | End: 2022-10-27

## 2021-06-04 ENCOUNTER — PATIENT MESSAGE (OUTPATIENT)
Dept: SPORTS MEDICINE | Facility: CLINIC | Age: 56
End: 2021-06-04

## 2021-06-09 ENCOUNTER — OFFICE VISIT (OUTPATIENT)
Dept: SPORTS MEDICINE | Facility: CLINIC | Age: 56
End: 2021-06-09
Payer: COMMERCIAL

## 2021-06-09 ENCOUNTER — APPOINTMENT (OUTPATIENT)
Dept: RADIOLOGY | Facility: OTHER | Age: 56
End: 2021-06-09
Attending: ORTHOPAEDIC SURGERY
Payer: COMMERCIAL

## 2021-06-09 VITALS
DIASTOLIC BLOOD PRESSURE: 86 MMHG | WEIGHT: 176 LBS | BODY MASS INDEX: 27.62 KG/M2 | SYSTOLIC BLOOD PRESSURE: 146 MMHG | HEIGHT: 67 IN

## 2021-06-09 DIAGNOSIS — M99.04 SOMATIC DYSFUNCTION OF SACRAL REGION: ICD-10-CM

## 2021-06-09 DIAGNOSIS — M99.03 SOMATIC DYSFUNCTION OF LUMBAR REGION: ICD-10-CM

## 2021-06-09 DIAGNOSIS — M54.50 DORSALGIA OF LUMBAR REGION: Primary | ICD-10-CM

## 2021-06-09 DIAGNOSIS — M99.05 SOMATIC DYSFUNCTION OF PELVIS REGION: ICD-10-CM

## 2021-06-09 DIAGNOSIS — M54.9 DORSALGIA, UNSPECIFIED: ICD-10-CM

## 2021-06-09 DIAGNOSIS — M99.02 SOMATIC DYSFUNCTION OF THORACIC REGION: ICD-10-CM

## 2021-06-09 DIAGNOSIS — M99.06 SOMATIC DYSFUNCTION OF LOWER EXTREMITY: ICD-10-CM

## 2021-06-09 PROCEDURE — 99214 PR OFFICE/OUTPT VISIT, EST, LEVL IV, 30-39 MIN: ICD-10-PCS | Mod: 25,S$GLB,, | Performed by: ORTHOPAEDIC SURGERY

## 2021-06-09 PROCEDURE — 1125F PR PAIN SEVERITY QUANTIFIED, PAIN PRESENT: ICD-10-PCS | Mod: S$GLB,,, | Performed by: ORTHOPAEDIC SURGERY

## 2021-06-09 PROCEDURE — 3008F PR BODY MASS INDEX (BMI) DOCUMENTED: ICD-10-PCS | Mod: CPTII,S$GLB,, | Performed by: ORTHOPAEDIC SURGERY

## 2021-06-09 PROCEDURE — 96372 THER/PROPH/DIAG INJ SC/IM: CPT | Mod: S$GLB,,, | Performed by: ORTHOPAEDIC SURGERY

## 2021-06-09 PROCEDURE — 72110 X-RAY EXAM L-2 SPINE 4/>VWS: CPT | Mod: TC

## 2021-06-09 PROCEDURE — 3008F BODY MASS INDEX DOCD: CPT | Mod: CPTII,S$GLB,, | Performed by: ORTHOPAEDIC SURGERY

## 2021-06-09 PROCEDURE — 98927 PR OSTEOPATHIC MANIP,5-6 BODY REGN: ICD-10-PCS | Mod: S$GLB,,, | Performed by: ORTHOPAEDIC SURGERY

## 2021-06-09 PROCEDURE — 99999 PR PBB SHADOW E&M-EST. PATIENT-LVL III: ICD-10-PCS | Mod: PBBFAC,,, | Performed by: ORTHOPAEDIC SURGERY

## 2021-06-09 PROCEDURE — 98927 OSTEOPATH MANJ 5-6 REGIONS: CPT | Mod: S$GLB,,, | Performed by: ORTHOPAEDIC SURGERY

## 2021-06-09 PROCEDURE — 99214 OFFICE O/P EST MOD 30 MIN: CPT | Mod: 25,S$GLB,, | Performed by: ORTHOPAEDIC SURGERY

## 2021-06-09 PROCEDURE — 96372 PR INJECTION,THERAP/PROPH/DIAG2ST, IM OR SUBCUT: ICD-10-PCS | Mod: S$GLB,,, | Performed by: ORTHOPAEDIC SURGERY

## 2021-06-09 PROCEDURE — 72110 XR LUMBAR SPINE COMPLETE 5 VIEW: ICD-10-PCS | Mod: 26,,, | Performed by: INTERNAL MEDICINE

## 2021-06-09 PROCEDURE — 1125F AMNT PAIN NOTED PAIN PRSNT: CPT | Mod: S$GLB,,, | Performed by: ORTHOPAEDIC SURGERY

## 2021-06-09 PROCEDURE — 99999 PR PBB SHADOW E&M-EST. PATIENT-LVL III: CPT | Mod: PBBFAC,,, | Performed by: ORTHOPAEDIC SURGERY

## 2021-06-09 PROCEDURE — 72110 X-RAY EXAM L-2 SPINE 4/>VWS: CPT | Mod: 26,,, | Performed by: INTERNAL MEDICINE

## 2021-06-09 RX ORDER — METHYLPREDNISOLONE 4 MG/1
TABLET ORAL
Qty: 1 PACKAGE | Refills: 0 | Status: SHIPPED | OUTPATIENT
Start: 2021-06-09 | End: 2021-06-21

## 2021-06-09 RX ORDER — KETOROLAC TROMETHAMINE 30 MG/ML
30 INJECTION, SOLUTION INTRAMUSCULAR; INTRAVENOUS ONCE
Status: SHIPPED | OUTPATIENT
Start: 2021-06-09 | End: 2021-06-12

## 2021-06-11 ENCOUNTER — PATIENT MESSAGE (OUTPATIENT)
Dept: SPORTS MEDICINE | Facility: CLINIC | Age: 56
End: 2021-06-11

## 2021-06-16 DIAGNOSIS — M54.9 DORSALGIA, UNSPECIFIED: ICD-10-CM

## 2021-06-18 ENCOUNTER — HOSPITAL ENCOUNTER (OUTPATIENT)
Dept: RADIOLOGY | Facility: HOSPITAL | Age: 56
Discharge: HOME | End: 2021-06-18
Attending: ORTHOPAEDIC SURGERY
Payer: COMMERCIAL

## 2021-06-18 DIAGNOSIS — M54.9 DORSALGIA, UNSPECIFIED: ICD-10-CM

## 2021-06-18 PROCEDURE — 72148 MRI LUMBAR SPINE WITHOUT CONTRAST: ICD-10-PCS | Mod: 26,,, | Performed by: RADIOLOGY

## 2021-06-18 PROCEDURE — 72148 MRI LUMBAR SPINE W/O DYE: CPT | Mod: 26,,, | Performed by: RADIOLOGY

## 2021-06-18 PROCEDURE — 72148 MRI LUMBAR SPINE W/O DYE: CPT | Mod: TC

## 2021-06-21 ENCOUNTER — OFFICE VISIT (OUTPATIENT)
Dept: SPORTS MEDICINE | Facility: CLINIC | Age: 56
End: 2021-06-21
Payer: COMMERCIAL

## 2021-06-21 DIAGNOSIS — M54.50 DORSALGIA OF LUMBAR REGION: Primary | ICD-10-CM

## 2021-06-21 DIAGNOSIS — M47.816 FACET ARTHROPATHY, LUMBAR: ICD-10-CM

## 2021-06-21 DIAGNOSIS — M51.36 DDD (DEGENERATIVE DISC DISEASE), LUMBAR: ICD-10-CM

## 2021-06-21 PROCEDURE — 99214 PR OFFICE/OUTPT VISIT, EST, LEVL IV, 30-39 MIN: ICD-10-PCS | Mod: 95,,, | Performed by: ORTHOPAEDIC SURGERY

## 2021-06-21 PROCEDURE — 99214 OFFICE O/P EST MOD 30 MIN: CPT | Mod: 95,,, | Performed by: ORTHOPAEDIC SURGERY

## 2021-06-22 ENCOUNTER — PATIENT MESSAGE (OUTPATIENT)
Dept: SPORTS MEDICINE | Facility: CLINIC | Age: 56
End: 2021-06-22

## 2021-06-22 ENCOUNTER — TELEPHONE (OUTPATIENT)
Dept: SPORTS MEDICINE | Facility: CLINIC | Age: 56
End: 2021-06-22

## 2021-06-22 DIAGNOSIS — M47.816 FACET ARTHROPATHY, LUMBAR: ICD-10-CM

## 2021-06-22 DIAGNOSIS — M51.36 DDD (DEGENERATIVE DISC DISEASE), LUMBAR: ICD-10-CM

## 2021-06-22 DIAGNOSIS — M54.50 DORSALGIA OF LUMBAR REGION: Primary | ICD-10-CM

## 2021-08-26 ENCOUNTER — PATIENT MESSAGE (OUTPATIENT)
Dept: SPORTS MEDICINE | Facility: CLINIC | Age: 56
End: 2021-08-26

## 2021-08-27 DIAGNOSIS — Z01.818 PRE-OP TESTING: Primary | ICD-10-CM

## 2021-09-14 ENCOUNTER — PATIENT MESSAGE (OUTPATIENT)
Dept: SPORTS MEDICINE | Facility: CLINIC | Age: 56
End: 2021-09-14

## 2021-12-01 ENCOUNTER — APPOINTMENT (OUTPATIENT)
Dept: RADIOLOGY | Facility: OTHER | Age: 56
End: 2021-12-01
Payer: COMMERCIAL

## 2021-12-01 DIAGNOSIS — Z09 FOLLOW-UP EXAMINATION, FOLLOWING OTHER SURGERY: Primary | ICD-10-CM

## 2021-12-01 DIAGNOSIS — Z09 FOLLOW-UP EXAMINATION, FOLLOWING OTHER SURGERY: ICD-10-CM

## 2021-12-01 PROCEDURE — 73630 XR FOOT COMPLETE 3 VIEW LEFT: ICD-10-PCS | Mod: 26,LT,, | Performed by: RADIOLOGY

## 2021-12-01 PROCEDURE — 73630 X-RAY EXAM OF FOOT: CPT | Mod: TC,LT

## 2021-12-01 PROCEDURE — 73630 X-RAY EXAM OF FOOT: CPT | Mod: 26,LT,, | Performed by: RADIOLOGY

## 2021-12-15 ENCOUNTER — OFFICE VISIT (OUTPATIENT)
Dept: SPORTS MEDICINE | Facility: CLINIC | Age: 56
End: 2021-12-15
Payer: COMMERCIAL

## 2021-12-15 VITALS
WEIGHT: 176 LBS | BODY MASS INDEX: 27.62 KG/M2 | HEART RATE: 73 BPM | HEIGHT: 67 IN | DIASTOLIC BLOOD PRESSURE: 68 MMHG | SYSTOLIC BLOOD PRESSURE: 115 MMHG

## 2021-12-15 DIAGNOSIS — M70.62 TROCHANTERIC BURSITIS OF LEFT HIP: Primary | ICD-10-CM

## 2021-12-15 PROCEDURE — 99214 OFFICE O/P EST MOD 30 MIN: CPT | Mod: S$GLB,,, | Performed by: ORTHOPAEDIC SURGERY

## 2021-12-15 PROCEDURE — 99214 PR OFFICE/OUTPT VISIT, EST, LEVL IV, 30-39 MIN: ICD-10-PCS | Mod: S$GLB,,, | Performed by: ORTHOPAEDIC SURGERY

## 2021-12-15 PROCEDURE — 99999 PR PBB SHADOW E&M-EST. PATIENT-LVL II: CPT | Mod: PBBFAC,,, | Performed by: ORTHOPAEDIC SURGERY

## 2021-12-15 PROCEDURE — 99999 PR PBB SHADOW E&M-EST. PATIENT-LVL II: ICD-10-PCS | Mod: PBBFAC,,, | Performed by: ORTHOPAEDIC SURGERY

## 2021-12-15 PROCEDURE — 4010F ACE/ARB THERAPY RXD/TAKEN: CPT | Mod: CPTII,S$GLB,, | Performed by: ORTHOPAEDIC SURGERY

## 2021-12-15 PROCEDURE — 4010F PR ACE/ARB THEARPY RXD/TAKEN: ICD-10-PCS | Mod: CPTII,S$GLB,, | Performed by: ORTHOPAEDIC SURGERY

## 2022-10-25 ENCOUNTER — TELEPHONE (OUTPATIENT)
Dept: SPORTS MEDICINE | Facility: CLINIC | Age: 57
End: 2022-10-25
Payer: COMMERCIAL

## 2022-10-25 NOTE — TELEPHONE ENCOUNTER
I spoke to the patient in regards to her appointment and she decided to be seen for her left shoulder pain with Dr. Barreto. We scheduled her an appointment for hip pain with Oniel Aguilar.

## 2022-10-25 NOTE — TELEPHONE ENCOUNTER
I called the patient. The patient didn't answer I left a message explaining we could see her for her shoulder pain  but she would need to see a different provider for her hip pain.

## 2022-10-26 ENCOUNTER — HOSPITAL ENCOUNTER (OUTPATIENT)
Dept: RADIOLOGY | Facility: HOSPITAL | Age: 57
Discharge: HOME | End: 2022-10-26
Attending: ORTHOPAEDIC SURGERY
Payer: COMMERCIAL

## 2022-10-26 ENCOUNTER — OFFICE VISIT (OUTPATIENT)
Dept: SPORTS MEDICINE | Facility: CLINIC | Age: 57
End: 2022-10-26
Payer: COMMERCIAL

## 2022-10-26 VITALS
BODY MASS INDEX: 31.55 KG/M2 | HEART RATE: 79 BPM | SYSTOLIC BLOOD PRESSURE: 104 MMHG | DIASTOLIC BLOOD PRESSURE: 64 MMHG | WEIGHT: 201 LBS | HEIGHT: 67 IN

## 2022-10-26 DIAGNOSIS — M25.512 LEFT SHOULDER PAIN, UNSPECIFIED CHRONICITY: Primary | ICD-10-CM

## 2022-10-26 DIAGNOSIS — M25.512 LEFT SHOULDER PAIN, UNSPECIFIED CHRONICITY: ICD-10-CM

## 2022-10-26 PROCEDURE — 4010F ACE/ARB THERAPY RXD/TAKEN: CPT | Mod: CPTII,S$GLB,, | Performed by: ORTHOPAEDIC SURGERY

## 2022-10-26 PROCEDURE — 99214 PR OFFICE/OUTPT VISIT, EST, LEVL IV, 30-39 MIN: ICD-10-PCS | Mod: S$GLB,,, | Performed by: ORTHOPAEDIC SURGERY

## 2022-10-26 PROCEDURE — 99999 PR PBB SHADOW E&M-EST. PATIENT-LVL III: ICD-10-PCS | Mod: PBBFAC,,, | Performed by: ORTHOPAEDIC SURGERY

## 2022-10-26 PROCEDURE — 99214 OFFICE O/P EST MOD 30 MIN: CPT | Mod: S$GLB,,, | Performed by: ORTHOPAEDIC SURGERY

## 2022-10-26 PROCEDURE — 3078F PR MOST RECENT DIASTOLIC BLOOD PRESSURE < 80 MM HG: ICD-10-PCS | Mod: CPTII,S$GLB,, | Performed by: ORTHOPAEDIC SURGERY

## 2022-10-26 PROCEDURE — 99999 PR PBB SHADOW E&M-EST. PATIENT-LVL III: CPT | Mod: PBBFAC,,, | Performed by: ORTHOPAEDIC SURGERY

## 2022-10-26 PROCEDURE — 73030 X-RAY EXAM OF SHOULDER: CPT | Mod: 26,LT,, | Performed by: RADIOLOGY

## 2022-10-26 PROCEDURE — 1159F MED LIST DOCD IN RCRD: CPT | Mod: CPTII,S$GLB,, | Performed by: ORTHOPAEDIC SURGERY

## 2022-10-26 PROCEDURE — 73030 X-RAY EXAM OF SHOULDER: CPT | Mod: TC,LT

## 2022-10-26 PROCEDURE — 4010F PR ACE/ARB THEARPY RXD/TAKEN: ICD-10-PCS | Mod: CPTII,S$GLB,, | Performed by: ORTHOPAEDIC SURGERY

## 2022-10-26 PROCEDURE — 1159F PR MEDICATION LIST DOCUMENTED IN MEDICAL RECORD: ICD-10-PCS | Mod: CPTII,S$GLB,, | Performed by: ORTHOPAEDIC SURGERY

## 2022-10-26 PROCEDURE — 3078F DIAST BP <80 MM HG: CPT | Mod: CPTII,S$GLB,, | Performed by: ORTHOPAEDIC SURGERY

## 2022-10-26 PROCEDURE — 73030 XR SHOULDER COMPLETE 2 OR MORE VIEWS LEFT: ICD-10-PCS | Mod: 26,LT,, | Performed by: RADIOLOGY

## 2022-10-26 PROCEDURE — 3074F PR MOST RECENT SYSTOLIC BLOOD PRESSURE < 130 MM HG: ICD-10-PCS | Mod: CPTII,S$GLB,, | Performed by: ORTHOPAEDIC SURGERY

## 2022-10-26 PROCEDURE — 3074F SYST BP LT 130 MM HG: CPT | Mod: CPTII,S$GLB,, | Performed by: ORTHOPAEDIC SURGERY

## 2022-10-26 RX ORDER — CYCLOBENZAPRINE HCL 10 MG
10 TABLET ORAL 3 TIMES DAILY PRN
Qty: 60 TABLET | Refills: 0 | Status: SHIPPED | OUTPATIENT
Start: 2022-10-26 | End: 2022-11-05

## 2022-10-26 RX ORDER — METHYLPREDNISOLONE 4 MG/1
TABLET ORAL
Qty: 21 EACH | Refills: 0 | Status: SHIPPED | OUTPATIENT
Start: 2022-10-26 | End: 2022-11-16

## 2022-10-26 RX ORDER — ROSUVASTATIN CALCIUM 5 MG/1
5 TABLET, COATED ORAL DAILY
COMMUNITY

## 2022-10-26 NOTE — PROGRESS NOTES
CC: left shoulder pain. Prior patient of mine.     57 y.o. Female reports left shoulder pain.    Pain present for 2 months.  Denies trauma or injury.  Pain becomes in neck/trapezius and radiates to left arm below elbow, occasionally into fingertips. She also reports occasional numbness in her left arm.    No aggravating or alleviating factors./    She has tried meloxicam for 5 weeks with no relief.  She has tried a heating pad for 3 weeks with no relief.        PREVIOUS SURGERY (2019)  Outside surgeon (Hailey Chan, Dr. Mitchell Pemberton)  Left Rotator Cuff Repair      Review of Systems   Constitution: Negative. Negative for chills, fever and night sweats.   HENT: Negative for congestion and headaches.    Eyes: Negative for blurred vision, left vision loss and right vision loss.   Cardiovascular: Negative for chest pain and syncope.   Respiratory: Negative for cough and shortness of breath.    Endocrine: Negative for polydipsia, polyphagia and polyuria.   Hematologic/Lymphatic: Negative for bleeding problem. Does not bruise/bleed easily.   Skin: Negative for dry skin, itching and rash.   Musculoskeletal: Negative for falls and muscle weakness.   Gastrointestinal: Negative for abdominal pain and bowel incontinence.   Genitourinary: Negative for bladder incontinence and nocturia.   Neurological: Negative for disturbances in coordination, loss of balance and seizures.   Psychiatric/Behavioral: Negative for depression. The patient does not have insomnia.    Allergic/Immunologic: Negative for hives and persistent infections.     PAST MEDICAL HISTORY:   Past Medical History:   Diagnosis Date    Arrhythmia     bigeminy & trigeminy     followed cardiologist    Pulmonary embolism     Bilateral     PAST SURGICAL HISTORY:   Past Surgical History:   Procedure Laterality Date    APPENDECTOMY       SECTION N/A     HYSTERECTOMY Bilateral     LAPAROSCOPY W/ MINI-LAPAROTOMY Right     SHOULDER ARTHROSCOPY       FAMILY  "HISTORY: History reviewed. No pertinent family history.  SOCIAL HISTORY:   Social History     Socioeconomic History    Marital status:    Tobacco Use    Smoking status: Never    Smokeless tobacco: Never   Substance and Sexual Activity    Alcohol use: Yes     Comment: socially       MEDICATIONS:   Current Outpatient Medications:     cyclobenzaprine (FLEXERIL) 10 MG tablet, Take 1 tablet (10 mg total) by mouth 3 (three) times daily as needed for Muscle spasms., Disp: 60 tablet, Rfl: 0    dicyclomine (BENTYL) 10 MG capsule, Take 10 mg by mouth every 6 (six) hours as needed., Disp: , Rfl:     estradioL (ESTRACE) 0.01 % (0.1 mg/gram) vaginal cream, estradiol 0.01% (0.1 mg/gram) vaginal cream, Disp: , Rfl:     famotidine (PEPCID) 20 MG tablet, Take 20 mg by mouth 2 (two) times daily., Disp: , Rfl:     fluticasone propionate (FLONASE) 50 mcg/actuation nasal spray, fluticasone propionate 50 mcg/actuation nasal spray,suspension, Disp: , Rfl:     ibuprofen (ADVIL,MOTRIN) 600 MG tablet, ibuprofen 600 mg tablet, Disp: , Rfl:     losartan (COZAAR) 25 MG tablet, Take 25 mg by mouth every evening. , Disp: , Rfl:     meloxicam (MOBIC) 15 MG tablet, Take 1 tablet (15 mg total) by mouth once daily., Disp: 30 tablet, Rfl: 3    methylPREDNISolone (MEDROL DOSEPACK) 4 mg tablet, use as directed, Disp: 21 each, Rfl: 0    metroNIDAZOLE (FLAGYL) 500 MG tablet, Take 500 mg by mouth every 8 (eight) hours., Disp: , Rfl:     omega-3 fatty acids/fish oil (FISH OIL-OMEGA-3 FATTY ACIDS) 300-1,000 mg capsule, Take 2 g by mouth., Disp: , Rfl:     ondansetron (ZOFRAN-ODT) 4 MG TbDL, , Disp: , Rfl:     rosuvastatin (CRESTOR) 5 MG tablet, Take 5 mg by mouth once daily., Disp: , Rfl:   ALLERGIES:   Review of patient's allergies indicates:   Allergen Reactions    Morphine Itching    Penicillins Rash       VITAL SIGNS: /64   Pulse 79   Ht 5' 7" (1.702 m)   Wt 91.2 kg (201 lb)   BMI 31.48 kg/m²      PHYSICAL EXAMINATION:  General:  The " patient is alert and oriented x 3.  Mood is pleasant.  Observation of ears, eyes and nose reveal no gross abnormalities.  No labored breathing observed.  Gait is coordinated. Patient can toe walk and heel walk without difficulty.      left SHOULDER / UPPER EXTREMITY EXAM    OBSERVATION:     Swelling  none  Deformity  none   Discoloration  none   Scapular winging none   Scars   none  Atrophy  none    TENDERNESS / CREPITUS (T/C):          T/C      T/C   Clavicle   -/-  SUPRAspinatus    -/-     AC Jt.    -/-  INFRAspinatus  -/-    SC Jt.    -/-  Deltoid    -/-      G. Tuberosity  -/-  LH BICEP groove  +/-   Acromion:  -/-  Midline Neck   -/-     Scapular Spine -/-  Trapezium   -/-   SMA Scapula  -/-  GH jt. line - post  -/-     Scapulothoracic  -/-         ROM: (* = with pain)  Right shoulder   Left shoulder        AROM (PROM)   AROM (PROM)   FE    170° (175°)     170° (175°)     ER at 0°    60°  (65°)    60°  (65°)   ER at 90° ABD  90°  (90°)    90°  (90°)   IR at 90°  ABD   NA  (40°)     NA  (40°)      IR (spine level)   T10     T10    STRENGTH: (* = with pain) RIGHT SHOULDER  LEFT SHOULDER   SCAPTION at 0  5/5    5/5   SCAPTION at 30  5/5    5/5    IR    5/5    5/5   ER    5/5    5/5   BICEPS   5/5    5/5   Deltoid    5/5    5/5     SIGNS:  Painful side       NEER   +    OPEMAS  +    JOSEPH   +    SPEEDS  neg     DROP ARM   neg   BELLY PRESS neg   Superior escape none    LIFT-OFF  neg   X-Body ADD    neg    MOVING VALGUS neg        STABILITY TESTING    RIGHT SHOULDER   LEFT SHOULDER     Translation     Anterior  up face     up face    Posterior  up face    up face    Sulcus   < 10mm    < 10 mm     Signs   Apprehension   neg      neg       Relocation   no change     no change      Jerk test  neg     neg    EXTREMITY NEURO-VASCULAR EXAM    Sensation grossly intact to light touch all dermatomal regions.    DTR 2+ Biceps, Triceps, BR and Negative Jades sign   Grossly intact motor function at Elbow, Wrist  and Hand   Distal pulses radial and ulnar 2+, brisk cap refill, symmetric.      NECK:  Painless FROM and spinous processes non-tender. Negative Spurlings sign.      OTHER FINDINGS:  + scapular dyskinesia    XRAYS reviewed and interpreted personally by me:  Shoulder trauma series left,  were obtained and reviewed  No convincing fracture or dislocation is noted. The osseous structures appear well mineralized and well aligned    MRI reviewed and interpreted personally by me::  Partial thickness bursal tear, approx 30%.  + biceps tendonitis  + SLAP II        ASSESSMENT:  Cervical Radiculopathy    PLAN:   Physical therapy  Flexeril  Medrol dosepak  If no improvement in 6 weeks, recommend follow up with back and spine.      All questions were answered, pt will contact us for questions or concerns in the interim.

## 2022-10-27 ENCOUNTER — OFFICE VISIT (OUTPATIENT)
Dept: ORTHOPEDICS | Facility: CLINIC | Age: 57
End: 2022-10-27
Payer: COMMERCIAL

## 2022-10-27 ENCOUNTER — HOSPITAL ENCOUNTER (OUTPATIENT)
Dept: RADIOLOGY | Facility: HOSPITAL | Age: 57
Discharge: HOME | End: 2022-10-27
Attending: NURSE PRACTITIONER
Payer: COMMERCIAL

## 2022-10-27 VITALS — WEIGHT: 196 LBS | HEIGHT: 67 IN | BODY MASS INDEX: 30.76 KG/M2

## 2022-10-27 DIAGNOSIS — M25.551 RIGHT HIP PAIN: Primary | ICD-10-CM

## 2022-10-27 DIAGNOSIS — M54.50 RIGHT LOW BACK PAIN, UNSPECIFIED CHRONICITY, UNSPECIFIED WHETHER SCIATICA PRESENT: Primary | ICD-10-CM

## 2022-10-27 DIAGNOSIS — G89.29 CHRONIC RIGHT-SIDED LOW BACK PAIN WITHOUT SCIATICA: ICD-10-CM

## 2022-10-27 DIAGNOSIS — M25.551 RIGHT HIP PAIN: ICD-10-CM

## 2022-10-27 DIAGNOSIS — M54.50 CHRONIC RIGHT-SIDED LOW BACK PAIN WITHOUT SCIATICA: ICD-10-CM

## 2022-10-27 PROCEDURE — 4010F ACE/ARB THERAPY RXD/TAKEN: CPT | Mod: CPTII,S$GLB,, | Performed by: NURSE PRACTITIONER

## 2022-10-27 PROCEDURE — 73502 XR HIP WITH PELVIS WHEN PERFORMED, 2 OR 3  VIEWS RIGHT: ICD-10-PCS | Mod: 26,RT,, | Performed by: RADIOLOGY

## 2022-10-27 PROCEDURE — 1159F PR MEDICATION LIST DOCUMENTED IN MEDICAL RECORD: ICD-10-PCS | Mod: CPTII,S$GLB,, | Performed by: NURSE PRACTITIONER

## 2022-10-27 PROCEDURE — 1159F MED LIST DOCD IN RCRD: CPT | Mod: CPTII,S$GLB,, | Performed by: NURSE PRACTITIONER

## 2022-10-27 PROCEDURE — 4010F PR ACE/ARB THEARPY RXD/TAKEN: ICD-10-PCS | Mod: CPTII,S$GLB,, | Performed by: NURSE PRACTITIONER

## 2022-10-27 PROCEDURE — 99203 PR OFFICE/OUTPT VISIT, NEW, LEVL III, 30-44 MIN: ICD-10-PCS | Mod: S$GLB,,, | Performed by: NURSE PRACTITIONER

## 2022-10-27 PROCEDURE — 99203 OFFICE O/P NEW LOW 30 MIN: CPT | Mod: S$GLB,,, | Performed by: NURSE PRACTITIONER

## 2022-10-27 PROCEDURE — 99999 PR PBB SHADOW E&M-EST. PATIENT-LVL III: ICD-10-PCS | Mod: PBBFAC,,, | Performed by: NURSE PRACTITIONER

## 2022-10-27 PROCEDURE — 99999 PR PBB SHADOW E&M-EST. PATIENT-LVL III: CPT | Mod: PBBFAC,,, | Performed by: NURSE PRACTITIONER

## 2022-10-27 PROCEDURE — 73502 X-RAY EXAM HIP UNI 2-3 VIEWS: CPT | Mod: TC,RT

## 2022-10-27 PROCEDURE — 73502 X-RAY EXAM HIP UNI 2-3 VIEWS: CPT | Mod: 26,RT,, | Performed by: RADIOLOGY

## 2022-10-27 RX ORDER — TRAMADOL HYDROCHLORIDE 50 MG/1
50 TABLET ORAL EVERY 8 HOURS PRN
COMMUNITY
Start: 2022-10-04 | End: 2024-02-13 | Stop reason: ALTCHOICE

## 2022-10-27 RX ORDER — BIOTIN 5 MG
5 TABLET ORAL
COMMUNITY

## 2022-10-27 NOTE — PROGRESS NOTES
SUBJECTIVE:     Chief Complaint & History of Present Illness:  Estela Vázquez is a New 57 y.o. year old female patient presenting today for constant right hip pain which started several weeks ago but worse for the past week.  There is not a history of trauma.  She states pain worsened after twisting at the waist and she had pain with walking.  She reports she seen Dr. Barreto yesterday for her left shoulder and was treated with a medrol dose pack and flexeril for suspected cervical radiculopathy.  She reports she was previously seeing Dr. CLYDE Sánchez in sports for her back pain.  He normally treated her with manipulation and Toradol but feels this pain is different.  The pain is located in the posterior/lateral aspect of the hip.  The pain is described as achy, 7/10.  It is is worse with walking and twisting at the waist .  There is radiation into the back.  Previous treatments include Toradol, Steroids, and flexeril which have provided adequate relief.  There is not a history of previous injury or surgery to the hip.  The patient does not use an assistive device.      Past Medical History:   Diagnosis Date    Arrhythmia     bigeminy & trigeminy     followed cardiologist    Pulmonary embolism     Bilateral       Past Surgical History:   Procedure Laterality Date    APPENDECTOMY       SECTION N/A     HYSTERECTOMY Bilateral     LAPAROSCOPY W/ MINI-LAPAROTOMY Right     SHOULDER ARTHROSCOPY         History reviewed. No pertinent family history.    Review of patient's allergies indicates:   Allergen Reactions    Morphine Itching    Penicillins Rash         Current Outpatient Medications:     cyclobenzaprine (FLEXERIL) 10 MG tablet, Take 1 tablet (10 mg total) by mouth 3 (three) times daily as needed for Muscle spasms., Disp: 60 tablet, Rfl: 0    dicyclomine (BENTYL) 10 MG capsule, Take 10 mg by mouth every 6 (six) hours as needed., Disp: , Rfl:     estradioL (ESTRACE) 0.01 % (0.1 mg/gram) vaginal cream,  "estradiol 0.01% (0.1 mg/gram) vaginal cream, Disp: , Rfl:     famotidine (PEPCID) 20 MG tablet, Take 20 mg by mouth 2 (two) times daily., Disp: , Rfl:     fluticasone propionate (FLONASE) 50 mcg/actuation nasal spray, fluticasone propionate 50 mcg/actuation nasal spray,suspension, Disp: , Rfl:     ibuprofen (ADVIL,MOTRIN) 600 MG tablet, ibuprofen 600 mg tablet, Disp: , Rfl:     losartan (COZAAR) 25 MG tablet, Take 25 mg by mouth every evening. , Disp: , Rfl:     meloxicam (MOBIC) 15 MG tablet, Take 1 tablet (15 mg total) by mouth once daily., Disp: 30 tablet, Rfl: 3    methylPREDNISolone (MEDROL DOSEPACK) 4 mg tablet, use as directed, Disp: 21 each, Rfl: 0    metroNIDAZOLE (FLAGYL) 500 MG tablet, Take 500 mg by mouth every 8 (eight) hours., Disp: , Rfl:     omega-3 fatty acids/fish oil (FISH OIL-OMEGA-3 FATTY ACIDS) 300-1,000 mg capsule, Take 2 g by mouth., Disp: , Rfl:     ondansetron (ZOFRAN-ODT) 4 MG TbDL, , Disp: , Rfl:     rosuvastatin (CRESTOR) 5 MG tablet, Take 5 mg by mouth once daily., Disp: , Rfl:     Review of Systems:  ROS:  Constitutional: no fever or chills  Eyes: no visual changes  ENT: no nasal congestion or sore throat  Respiratory: no cough or shortness of breath  Cardiovascular: no chest pain or palpitations  Gastrointestinal: no nausea or vomiting, tolerating diet  Genitourinary: no hematuria or dysuria  Integument/Breast: no rash or pruritis  Hematologic/Lymphatic: no easy bruising or lymphadenopathy  Musculoskeletal: positive for arthralgias and back pain  Neurological: no seizures or tremors  Behavioral/Psych: no auditory or visual hallucinations  Endocrine: no heat or cold intolerance      PE:  Ht 5' 7" (1.702 m)   Wt 88.9 kg (196 lb)   BMI 30.70 kg/m²   Estimated body mass index is 30.7 kg/m² as calculated from the following:    Height as of this encounter: 5' 7" (1.702 m).    Weight as of this encounter: 88.9 kg (196 lb).   General: Pleasant, cooperative, NAD   HEENT: NCAT, sclera " nonicteric   Lungs: Respirations are equal and unlabored.   Abdomen: Soft and non-tender.  CV: 2+ bilateral upper and lower extremity pulses.   Skin: Intact throughout LE with no rashes, erythema, or lesions  Extremities: No LE edema, NVI lower extremities      Hip Exam:   right pain with straight leg raises    90 degrees flexion  0 degrees extension   35 degrees internal rotation  35 degrees external rotation  10 degrees abduction  10 degrees adduction   No pain with log roll or heel tap.    RADIOGRAPHS:  X-ray of the right hip was obtained, no acute fractures seen.  She does appears to have DJD in the lumbar spine.  MRI of her lumbar spine dated 6/18/21 shows mild DJD at L4-L5 with facet joint arthropathy with mild neural foraminal.  All radiographs were personally reviewed by me.    ASSESSMENT/PLAN:       ICD-10-CM ICD-9-CM   1. Right hip pain  M25.551 719.45   2. Chronic right-sided low back pain without sciatica  M54.50 724.2    G89.29 338.29       Plan: We discussed with the patient at length all the different treatment options available for arthrosis of the hip including anti-inflammatories, acetaminophen, rest, ice, lower extremity strengthening exercise, occasional cortisone injections for temporary relief, and finally total hip arthroplasty.     -Estela Vázquez presents to clinic today with c/c right hip pain for the past several week, worse for last week.  -X-ray as above.  -Recommend RICE therapy.  -I believe her pain is coming from her back due to clinical symptoms of pain with twisting and straight leg raises.  Previously was seeing Dr. Sánchez but was told at sports he no longer sees back patients.  -Recommend she continue Medrol dose pack and flexeril given to her yesterday and suggested using Salonpas patch to her back or may try Icy Hot topical cream.  -Refer to spine clinic next week after completion of medrol dose pack with dedicated lumbar x-rays.

## 2022-11-01 ENCOUNTER — OFFICE VISIT (OUTPATIENT)
Dept: ORTHOPEDICS | Facility: CLINIC | Age: 57
End: 2022-11-01
Payer: COMMERCIAL

## 2022-11-01 ENCOUNTER — HOSPITAL ENCOUNTER (OUTPATIENT)
Dept: RADIOLOGY | Facility: HOSPITAL | Age: 57
Discharge: HOME | End: 2022-11-01
Attending: ORTHOPAEDIC SURGERY
Payer: COMMERCIAL

## 2022-11-01 VITALS — WEIGHT: 199.75 LBS | HEIGHT: 67 IN | BODY MASS INDEX: 31.35 KG/M2

## 2022-11-01 DIAGNOSIS — M54.16 LUMBAR RADICULOPATHY: ICD-10-CM

## 2022-11-01 DIAGNOSIS — M51.36 DDD (DEGENERATIVE DISC DISEASE), LUMBAR: ICD-10-CM

## 2022-11-01 DIAGNOSIS — M54.50 RIGHT LOW BACK PAIN, UNSPECIFIED CHRONICITY, UNSPECIFIED WHETHER SCIATICA PRESENT: ICD-10-CM

## 2022-11-01 DIAGNOSIS — M47.816 LUMBAR SPONDYLOSIS: Primary | ICD-10-CM

## 2022-11-01 PROCEDURE — 3008F PR BODY MASS INDEX (BMI) DOCUMENTED: ICD-10-PCS | Mod: CPTII,S$GLB,, | Performed by: ORTHOPAEDIC SURGERY

## 2022-11-01 PROCEDURE — 72114 XR LUMBAR SPINE 5 VIEW WITH FLEX AND EXT: ICD-10-PCS | Mod: 26,,, | Performed by: RADIOLOGY

## 2022-11-01 PROCEDURE — 1159F PR MEDICATION LIST DOCUMENTED IN MEDICAL RECORD: ICD-10-PCS | Mod: CPTII,S$GLB,, | Performed by: ORTHOPAEDIC SURGERY

## 2022-11-01 PROCEDURE — 1160F PR REVIEW ALL MEDS BY PRESCRIBER/CLIN PHARMACIST DOCUMENTED: ICD-10-PCS | Mod: CPTII,S$GLB,, | Performed by: ORTHOPAEDIC SURGERY

## 2022-11-01 PROCEDURE — 1160F RVW MEDS BY RX/DR IN RCRD: CPT | Mod: CPTII,S$GLB,, | Performed by: ORTHOPAEDIC SURGERY

## 2022-11-01 PROCEDURE — 99214 PR OFFICE/OUTPT VISIT, EST, LEVL IV, 30-39 MIN: ICD-10-PCS | Mod: S$GLB,,, | Performed by: ORTHOPAEDIC SURGERY

## 2022-11-01 PROCEDURE — 72114 X-RAY EXAM L-S SPINE BENDING: CPT | Mod: 26,,, | Performed by: RADIOLOGY

## 2022-11-01 PROCEDURE — 3008F BODY MASS INDEX DOCD: CPT | Mod: CPTII,S$GLB,, | Performed by: ORTHOPAEDIC SURGERY

## 2022-11-01 PROCEDURE — 1159F MED LIST DOCD IN RCRD: CPT | Mod: CPTII,S$GLB,, | Performed by: ORTHOPAEDIC SURGERY

## 2022-11-01 PROCEDURE — 99214 OFFICE O/P EST MOD 30 MIN: CPT | Mod: S$GLB,,, | Performed by: ORTHOPAEDIC SURGERY

## 2022-11-01 PROCEDURE — 72114 X-RAY EXAM L-S SPINE BENDING: CPT | Mod: TC

## 2022-11-01 PROCEDURE — 99999 PR PBB SHADOW E&M-EST. PATIENT-LVL III: ICD-10-PCS | Mod: PBBFAC,,, | Performed by: ORTHOPAEDIC SURGERY

## 2022-11-01 PROCEDURE — 99999 PR PBB SHADOW E&M-EST. PATIENT-LVL III: CPT | Mod: PBBFAC,,, | Performed by: ORTHOPAEDIC SURGERY

## 2022-11-01 PROCEDURE — 4010F ACE/ARB THERAPY RXD/TAKEN: CPT | Mod: CPTII,S$GLB,, | Performed by: ORTHOPAEDIC SURGERY

## 2022-11-01 PROCEDURE — 4010F PR ACE/ARB THEARPY RXD/TAKEN: ICD-10-PCS | Mod: CPTII,S$GLB,, | Performed by: ORTHOPAEDIC SURGERY

## 2022-11-01 RX ORDER — GABAPENTIN 300 MG/1
300 CAPSULE ORAL 3 TIMES DAILY
Qty: 60 CAPSULE | Refills: 1 | Status: SHIPPED | OUTPATIENT
Start: 2022-11-01 | End: 2024-01-02

## 2022-11-01 NOTE — PROGRESS NOTES
DATE: 2022  PATIENT: Estela Vázquez    Attending Physician: Semaj Cleveland M.D.    CHIEF COMPLAINT: LBP and R hip pain    HISTORY:  Estela Vázquez is a 57 y.o. female here for initial evaluation of low back and right hip pain (Back - 8, Leg - 8). The pain has been present for 35 years and it was exacerbated a few months ago. The patient describes the pain as dull and it radiates anterolaterally to the R hip; no groin pain.  The pain is worse with activity and improved by rest. There is no associated numbness and tingling. There is no subjective weakness. Prior treatments have included meds (medrol dose pack, mobic, flexeril), but no PT, ALEX or surgery.    The Patient denies myelopathic symptoms such as handwriting changes or difficulty with buttons/coins/keys. Denies perineal paresthesias, bowel/bladder dysfunction.    The patient does not smoke, have DM or endorse IVDU. The patient is not on any blood thinners and does not take chronic narcotics. She is a retired nurse.    PAST MEDICAL/SURGICAL HISTORY:  Past Medical History:   Diagnosis Date    Arrhythmia     bigeminy & trigeminy     followed cardiologist    Pulmonary embolism     Bilateral     Past Surgical History:   Procedure Laterality Date    APPENDECTOMY       SECTION N/A     HYSTERECTOMY Bilateral     LAPAROSCOPY W/ MINI-LAPAROTOMY Right     SHOULDER ARTHROSCOPY         Current Medications:   Current Outpatient Medications:     biotin 5 mg Tab, Take 5 mg by mouth., Disp: , Rfl:     cyclobenzaprine (FLEXERIL) 10 MG tablet, Take 1 tablet (10 mg total) by mouth 3 (three) times daily as needed for Muscle spasms., Disp: 60 tablet, Rfl: 0    dicyclomine (BENTYL) 10 MG capsule, Take 10 mg by mouth every 6 (six) hours as needed., Disp: , Rfl:     estradioL (ESTRACE) 0.01 % (0.1 mg/gram) vaginal cream, estradiol 0.01% (0.1 mg/gram) vaginal cream, Disp: , Rfl:     fluticasone propionate (FLONASE) 50 mcg/actuation nasal spray, fluticasone  "propionate 50 mcg/actuation nasal spray,suspension, Disp: , Rfl:     losartan (COZAAR) 25 MG tablet, Take 25 mg by mouth every evening. , Disp: , Rfl:     meloxicam (MOBIC) 15 MG tablet, Take 1 tablet (15 mg total) by mouth once daily., Disp: 30 tablet, Rfl: 3    methylPREDNISolone (MEDROL DOSEPACK) 4 mg tablet, use as directed, Disp: 21 each, Rfl: 0    omega-3 fatty acids/fish oil (FISH OIL-OMEGA-3 FATTY ACIDS) 300-1,000 mg capsule, Take 2 g by mouth., Disp: , Rfl:     rosuvastatin (CRESTOR) 5 MG tablet, Take 5 mg by mouth once daily., Disp: , Rfl:     traMADoL (ULTRAM) 50 mg tablet, Take 50 mg by mouth every 8 (eight) hours as needed., Disp: , Rfl:     TURMERIC ORAL, Take 1,000 mg by mouth., Disp: , Rfl:     gabapentin (NEURONTIN) 300 MG capsule, Take 1 capsule (300 mg total) by mouth 3 (three) times daily., Disp: 60 capsule, Rfl: 1    Social History:   Social History     Socioeconomic History    Marital status:    Tobacco Use    Smoking status: Never    Smokeless tobacco: Never   Substance and Sexual Activity    Alcohol use: Yes     Comment: socially       REVIEW OF SYSTEMS:  Constitution: Negative. Negative for chills, fever and night sweats.   Cardiovascular: Negative for chest pain and syncope.   Respiratory: Negative for cough and shortness of breath.   Gastrointestinal: See HPI. Negative for nausea/vomiting. Negative for abdominal pain.  Genitourinary: See HPI. Negative for discoloration or dysuria.  Hematologic/Lymphatic: negative for bleeding/clotting disorders.   Musculoskeletal: Negative for falls and muscle weakness.   Neurological: See HPI. no history of seizures. no history of cranial surgery or shunts.  Neurological: See HPI. No seizures.   Endocrine: Negative for polydipsia, polyphagia and polyuria.   Allergic/Immunologic: Negative for hives and persistent infections.     EXAM:  Ht 5' 7" (1.702 m)   Wt 90.6 kg (199 lb 11.8 oz)   BMI 31.28 kg/m²     PHYSICAL EXAMINATION:    General: The " patient is a 57 y.o. female in no apparent distress, the patient is orientatied to person, place and time.  Psych: Normal mood and affect  HEENT: Vision grossly intact, hearing intact to the spoken word.  Lungs: Respirations unlabored.  Gait: Normal station and gait, no difficulty with toe or heel walk.   Skin: Dorsal lumbar skin negative for rashes, lesions, hairy patches and surgical scars. There is lower lumbar tenderness to palpation.  Range of motion: Lumbar range of motion is acceptable.  Spinal Balance: Global saggital and coronal spinal balance acceptable, no significant for scoliosis and kyphosis.  Musculoskeletal: No pain with the range of motion of the bilateral hips. No trochanteric tenderness to palpation.  Vascular: Bilateral lower extremities warm and well perfused, Dorsalis pedis pulses 2+ bilaterally.  Neurological: Normal strength and tone in all major motor groups in the bilateral lower extremities. Normal sensation to light touch in the L2-S1 dermatomes bilaterally.  Deep tendon reflexes symmetric 2+ in the bilateral lower extremities.  Negative Babinski bilaterally. Straight leg raise negative bilaterally.    IMAGING:   Today I independently reviewed the following images and my interpretations are as follows:    AP, Lat and Flex/Ex  upright L-spine demonstrate lumbar spondylosis and DDD without listhesis.    MRI lumbar from 2021 did not show any significant central or foraminal stenosis.      Body mass index is 31.28 kg/m².  No results found for: HGBA1C    ASSESSMENT/PLAN:    Estela was seen today for pain.    Diagnoses and all orders for this visit:    Lumbar spondylosis  -     Ambulatory referral/consult to Physical/Occupational Therapy; Future  -     gabapentin (NEURONTIN) 300 MG capsule; Take 1 capsule (300 mg total) by mouth 3 (three) times daily.    DDD (degenerative disc disease), lumbar    Lumbar radiculopathy      Follow up if symptoms worsen or fail to improve.    Patient has lumbar  spondylosis. I discussed the natural history of their diagnoses as well as surgical and nonsurgical treatment options. I educated the patient on the importance of core/back strengthening, correct posture, bending/lifting ergonomics, and low-impact aerobic exercises (walking, elliptical, and aquatherapy). I prescribed gabapentin. Continue medications. I will refer the patient to PT for core/back strengthening. No ortho spine surgical intervention warranted. Patient will follow up PRN. Next step is Pain Management referral ([facility]).    Semaj Cleveland MD  Orthopaedic Spine Surgeon  Department of Orthopaedic Surgery  [phone]

## 2022-11-18 ENCOUNTER — TELEPHONE (OUTPATIENT)
Dept: SPORTS MEDICINE | Facility: CLINIC | Age: 57
End: 2022-11-18
Payer: COMMERCIAL

## 2022-11-18 ENCOUNTER — PATIENT MESSAGE (OUTPATIENT)
Dept: SPORTS MEDICINE | Facility: CLINIC | Age: 57
End: 2022-11-18
Payer: COMMERCIAL

## 2022-11-18 DIAGNOSIS — M25.512 LEFT SHOULDER PAIN, UNSPECIFIED CHRONICITY: Primary | ICD-10-CM

## 2022-11-28 ENCOUNTER — CLINICAL SUPPORT (OUTPATIENT)
Dept: REHABILITATION | Facility: OTHER | Age: 57
End: 2022-11-28
Attending: ORTHOPAEDIC SURGERY
Payer: COMMERCIAL

## 2022-11-28 DIAGNOSIS — G89.29 CHRONIC RIGHT-SIDED LOW BACK PAIN WITHOUT SCIATICA: ICD-10-CM

## 2022-11-28 DIAGNOSIS — M47.816 LUMBAR SPONDYLOSIS: ICD-10-CM

## 2022-11-28 DIAGNOSIS — M25.512 LEFT SHOULDER PAIN, UNSPECIFIED CHRONICITY: ICD-10-CM

## 2022-11-28 DIAGNOSIS — M54.50 CHRONIC RIGHT-SIDED LOW BACK PAIN WITHOUT SCIATICA: ICD-10-CM

## 2022-11-28 DIAGNOSIS — M54.12 RADICULITIS OF LEFT CERVICAL REGION: ICD-10-CM

## 2022-11-28 PROBLEM — R26.9 GAIT DIFFICULTY: Status: RESOLVED | Noted: 2018-11-26 | Resolved: 2022-11-28

## 2022-11-28 PROBLEM — M25.552 LEFT HIP PAIN: Status: RESOLVED | Noted: 2020-12-21 | Resolved: 2022-11-28

## 2022-11-28 PROBLEM — M25.552 ACUTE HIP PAIN, LEFT: Status: RESOLVED | Noted: 2018-11-26 | Resolved: 2022-11-28

## 2022-11-28 PROBLEM — M62.81 MUSCLE WEAKNESS OF LOWER EXTREMITY: Status: RESOLVED | Noted: 2018-11-26 | Resolved: 2022-11-28

## 2022-11-28 PROCEDURE — 97163 PT EVAL HIGH COMPLEX 45 MIN: CPT | Mod: PN
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
## 2022-11-30 ENCOUNTER — CLINICAL SUPPORT (OUTPATIENT)
Dept: REHABILITATION | Facility: OTHER | Age: 57
End: 2022-11-30
Payer: COMMERCIAL

## 2022-11-30 DIAGNOSIS — M54.12 RADICULITIS OF LEFT CERVICAL REGION: ICD-10-CM

## 2022-11-30 DIAGNOSIS — G89.29 CHRONIC RIGHT-SIDED LOW BACK PAIN WITHOUT SCIATICA: Primary | ICD-10-CM

## 2022-11-30 DIAGNOSIS — M54.50 CHRONIC RIGHT-SIDED LOW BACK PAIN WITHOUT SCIATICA: Primary | ICD-10-CM

## 2022-11-30 PROCEDURE — 97110 THERAPEUTIC EXERCISES: CPT | Mod: PN

## 2022-11-30 NOTE — PROGRESS NOTES
"[facility]   Physical Therapy Treatment Note     Name: Estela Vázquez  Clinic Number: [id number]    Therapy Diagnosis:   Encounter Diagnoses   Name Primary?    Chronic right-sided low back pain without sciatica Yes    Radiculitis of left cervical region      Physician: Semaj Cleveland MD    Visit Date: 11/30/2022    Physician Orders: PT Eval and Treat  per MD for shoulder pain = Scapular dyskinesia, Scapular stabilization - [place] protocol, 1-3x/week x 6-8 weeks with HEP  Medical Diagnosis from Referral: M47.816 (ICD-10-CM) - Lumbar spondylosis  Additional Dx by Nika Barreto MD: M25.512 (ICD-10-CM) - Left shoulder pain, unspecified chronicity      Evaluation Date: 11/28/2022  Authorization Period Expiration: 12/31/2022  Plan of Care Expiration: 2/28/2023  Progress Note Due: 12/28/2022  Visit # / Visits authorized: 2/ 1   FOTO: 1/3 on 11/28/22     Precautions: Standard    PTA Visit #: 0/5     Time In: 10:00  Time Out: 10:45  Total Billable Time: 45 minutes    SUBJECTIVE     Pt reports: no change in sx.  She was not compliant with home exercise program. -  she was not given one during initial visit  Response to previous treatment: fair  Functional change: none    Pain: 8/10  Location: right low back and hip (glute)     OBJECTIVE     Objective Measures updated at progress report unless specified.     Treatment     Estela received the treatments listed below:      therapeutic exercises to develop strength, endurance, ROM, flexibility, posture, and core stabilization for 45 minutes including:    UQ:  Seated scap squeezes x 20 x 5" holds  Seated UT/LS stretches 3 x 15" holds ea  Radial nerve glides x 10  Ulnar nerve glides x 10  1/2 foam   Pec stretch x 2'   Abd <> bear hugs x 10   Claude OH flex x 10   SA punch x 10    Next visit (?): open books, prone scapular series    LQ:   LTR 3 x 20" static holds CLAUDE  PPT 10 x 10"  PPT + glute set into bridge initiation x 10  SL reverse clams (pilates block " "between knees) 10 x 10"    Next visit (?): piriformis stretch, supine hip flexor stretch, SLR with pilates ring press, reverse crunch w/ PB, SL clams    manual therapy techniques: Joint mobilizations, Manual traction, Myofacial release, Soft tissue Mobilization, and Friction Massage were applied to the: neck, back for 00 minutes, including:  None today.  Consider dry needling in the future    neuromuscular re-education activities to improve: Balance, Coordination, Kinesthetic, Sense, Proprioception, and Posture for 00 minutes. The following activities were included:  None today.  Core activation series next visit?    therapeutic activities to improve functional performance for 00  minutes, including:  None today.          Patient Education and Home Exercises     Home Exercises Provided and Patient Education Provided     Education provided:   - updated HEP 11/30/2022    Written Home Exercises Provided: Patient instructed to cont prior HEP. Exercises were reviewed and Estela was able to demonstrate them prior to the end of the session.  Estela demonstrated good  understanding of the education provided. See EMR under Patient Instructions for exercises provided during therapy sessions    ASSESSMENT     Initiated neural mobility, mm flexibility, scapular/core and glute strength today. Good tolerance overall with pt noting improvement in tension in low back and ability to perform upright posture, despite continued numbness/tingling down the LUE. Updated HEP to reflect postural mobility, neural mobility, and glute strengthening today. Pt demo'd good return technique and verbalized understanding.    Estela Is progressing well towards her goals.   Pt prognosis is Good.     Pt will continue to benefit from skilled outpatient physical therapy to address the deficits listed in the problem list box on initial evaluation, provide pt/family education and to maximize pt's level of independence in the home and community environment. "     Pt's spiritual, cultural and educational needs considered and pt agreeable to plan of care and goals.     Anticipated barriers to physical therapy: standard, chronicity of sx, multiple medical diagnoses, Hx of L ankle surgery, L shoulder surgery     Goals: updated 11/30/2022   Short Term Goals (6 Weeks):   1. Pt will report 20% reduction in pain of the lumbar spine and BLE for ease with ADL's  2. PT will demonstrate improved trunk strength by a half grade in for ease with upright posture during standing activities.  3. Pt will demonstrate improved lumbar spine ROM in all directions by a half grade for ease with bending activities.   4. Pt to demonstrate improved functional ability with FOTO limitation <=30% disability.  5. Pt to demonstrate improved  cervical ROM by 10% to allow pt to perform self care activities with increased ease.  6. Pt to demonstrate improved postural strength by a half grade to allow improved ADLs with increased ease.      Long Term Goals (12 Weeks):   1. Pt will report being independent with HEP for maintenance of improvements gained during therapy sessions  2. PT will report 50% reduction of pain of the back and BLE for ease with dressing and grooming activities.   3. Pt will demonstrate trunk and extremity strength to >=4+/5 without the provocation of pain for ease with household chores  4. Pt will demonstrate appropriate upright posture without external cueing for ease with work related activities.   5. Pt to demonstrate improved functional ability with FOTO limitation <=20% disability.  6. Pt to demonstrate improved cervical ROM to WNL, R=L, to allow pt to perform self care with increased ease.  7. Pt will demonstrate >=4+/5 strength within the L scapula and shoulder for ease with house hold chores    PLAN     Continue with POC for neural mobility, flexibility, trunk strength/stabilization.    Haydee Mckeon, PT

## 2022-12-05 ENCOUNTER — CLINICAL SUPPORT (OUTPATIENT)
Dept: REHABILITATION | Facility: OTHER | Age: 57
End: 2022-12-05
Payer: COMMERCIAL

## 2022-12-05 DIAGNOSIS — M54.12 RADICULITIS OF LEFT CERVICAL REGION: ICD-10-CM

## 2022-12-05 DIAGNOSIS — M54.50 CHRONIC RIGHT-SIDED LOW BACK PAIN WITHOUT SCIATICA: Primary | ICD-10-CM

## 2022-12-05 DIAGNOSIS — G89.29 CHRONIC RIGHT-SIDED LOW BACK PAIN WITHOUT SCIATICA: Primary | ICD-10-CM

## 2022-12-05 PROCEDURE — 97110 THERAPEUTIC EXERCISES: CPT | Mod: PN

## 2022-12-05 PROCEDURE — 97140 MANUAL THERAPY 1/> REGIONS: CPT | Mod: PN

## 2022-12-05 NOTE — PROGRESS NOTES
Dry Needling Daily Note     Patient ID: Estela Vázquez is a 57 y.o. female.  Diagnosis:   1. Chronic right-sided low back pain without sciatica        2. Radiculitis of left cervical region          Date:  12/05/2022    Total Time:  17 minutes      Subjective:     Pt reports: continued pain along L neck, upper trap, and pain down the arm  Pain Scale: Estela rates pain on a scale of 0-10 to be 3 currently.    Objective:     Pt signed written consent to dry needling Rx.  Pt gave verbal consent for DN.   Pt rec'd dry needling to L neck, shoulder with 1 in needles with no adverse effects.    Homeostatic points:  1. Deep Radial  2. Greater Auricular  3. Spinal Accessory  4. Saphenous  5. Deep Fibular  6. Tibial  7. Greater Occipital  8. Suprascapular ( infraspinatus)  9. Lateral Antebrachial Cutaneous  10. Sural  11. Lateral Popliteal  12. Superficial Radial  13. Dorsal Scapular  14. Superior Cluneal  15. Posterior Cutaneous L 2  16. Inferior Gluteal  17. Lateral Pectoral  18. Ilitotibal  19. Infraorbital  20. Spinous process T7  21. Posterior cutaneous  T6  22. Posterior cutaneous L 5  23. Supraorbital  24. Common fibular    Paravertebral Points:  C3-7, T1-4 (L)    Symptomatic Points:   Paraspinals, multifidi (x3 on L CSP), L UT/LS    Assessment:     Patient demonstrated appropriate response to FDN. Twitch response with pistoning to L UT. Mm grabbing during winding technique, used every 5 minutes. Pt notes improvement in muscle tightness in global neck with mild residual tightness over LS by end of session. Numbness/tingling down the arm remains the same (intermittent).    Patient Education/Response:     Education provided re:   Purpose, benefits, and potential side effects of dry needling.   Educated pt to use heat following treatment sessions to reduce c/o pain or soreness and to improve circulation to needled sites.   Encouraged pt to continue with HEP to maintain flexibility, ROM, and functional  mobility.  Estela verbalized good understanding of education     Plans and Goals:     Monitor response to FDN. Continue with FDN in POC as tolerated.     Haydee Mckeon, PT  12/5/2022

## 2022-12-05 NOTE — PROGRESS NOTES
"[facility]   Physical Therapy Treatment Note     Name: Estela Vázquez  Clinic Number: [id number]    Therapy Diagnosis:   Encounter Diagnoses   Name Primary?    Chronic right-sided low back pain without sciatica Yes    Radiculitis of left cervical region      Physician: Semaj Cleveland MD    Visit Date: 12/5/2022    Physician Orders: PT Eval and Treat  per MD for shoulder pain = Scapular dyskinesia, Scapular stabilization - [place] protocol, 1-3x/week x 6-8 weeks with HEP  Medical Diagnosis from Referral: M47.816 (ICD-10-CM) - Lumbar spondylosis  Additional Dx by Nika Barreto MD: M25.512 (ICD-10-CM) - Left shoulder pain, unspecified chronicity      Evaluation Date: 11/28/2022  Authorization Period Expiration: 12/31/2022  Plan of Care Expiration: 2/28/2023  Progress Note Due: 12/28/2022  Visit # / Visits authorized: 3/ 1   FOTO: 1/3 on 11/28/22     Precautions: Standard    PTA Visit #: 0/5     Time In: 10:50  Time Out: 11:45  Total Billable Time: 55 minutes    SUBJECTIVE     Pt reports: back is feeling better, neck and arm continue to be intermittently painful. Reports good consistency with HEP and notes it has helped her LBP become more manageable.  She was compliant with home exercise program.  Response to previous treatment: fair  Functional change: none    Pain: 1/10 (neck 3/10)  Location: right low back and hip (glute)     OBJECTIVE     Objective Measures updated at progress report unless specified.     Treatment     Estela received the treatments listed below:      therapeutic exercises to develop strength, endurance, ROM, flexibility, posture, and core stabilization for 30 minutes including:    **Exercises in BOLD performed today**    UQ:  Seated scap squeezes x 20 x 5" holds  Seated UT/LS stretches 3 x 15" holds ea  Radial nerve glides 2x 10  Ulnar nerve glides 2x 10  1/2 foam   Pec stretch x 2'   Abd <> bear hugs x 10   Neymar OH flex x 10   SA punch x 10    +supine chin tucks x 20 x " "5" holds  +chin tuck with claude ER x 20 x GTB  +chin tuck with claude Abd x 20 x GTB  +open books x 10 CLAUDE    Next visit (?): prone scapular series    LQ:   LTR 3 x 20" static holds CLAUDE  PPT 10 x 10"  PPT + glute set into bridge initiation x 10  SL reverse clams (pilates block between knees) 10 x 10"    Next visit (?): piriformis stretch, supine hip flexor stretch, SLR with pilates ring press, reverse crunch w/ PB, SL clams    manual therapy techniques: Joint mobilizations, Manual traction, Myofacial release, Soft tissue Mobilization, and Friction Massage were applied to the: neck, back for 25 minutes, includin min x Spring PAs T4-10, rib unilat/claude rotational PA glides T4-8  17 min x dry needling - see note by Haydee Erwin, PT    neuromuscular re-education activities to improve: Balance, Coordination, Kinesthetic, Sense, Proprioception, and Posture for 00 minutes. The following activities were included:  None today.  Core activation series next visit?    therapeutic activities to improve functional performance for 00  minutes, including:  None today.      Modalities: MHP to CSP simultaneous with supine therex x 10 min    Patient Education and Home Exercises     Home Exercises Provided and Patient Education Provided     Education provided:   - updated HEP 2022    Written Home Exercises Provided: Patient instructed to cont prior HEP. Exercises were reviewed and Estela was able to demonstrate them prior to the end of the session.  Estela demonstrated good  understanding of the education provided. See EMR under Patient Instructions for exercises provided during therapy sessions    ASSESSMENT     Reviewed HEP for understanding and technique, particularly with nerve glides as pt noted inability to recall technique to reduce pain yesterday. Initiated dry needling as pt notes good experience in the past as well as presenting with increased tone/tenderness to lower cervical/upper thoracic paraspinals as well as the " L upper trap and levator scap. Noted improvement in myofascial mobility following needling session, but pt also presents with marked joint mobility limitations in the upper TSP, which may contribute to flexed posture and neural tension. Plan to progress postural flexibility and strength next visit.      Estela Is progressing well towards her goals.   Pt prognosis is Good.     Pt will continue to benefit from skilled outpatient physical therapy to address the deficits listed in the problem list box on initial evaluation, provide pt/family education and to maximize pt's level of independence in the home and community environment.     Pt's spiritual, cultural and educational needs considered and pt agreeable to plan of care and goals.     Anticipated barriers to physical therapy: standard, chronicity of sx, multiple medical diagnoses, Hx of L ankle surgery, L shoulder surgery     Goals: updated 12/5/2022   Short Term Goals (6 Weeks):   1. Pt will report 20% reduction in pain of the lumbar spine and BLE for ease with ADL's  2. PT will demonstrate improved trunk strength by a half grade in for ease with upright posture during standing activities.  3. Pt will demonstrate improved lumbar spine ROM in all directions by a half grade for ease with bending activities.   4. Pt to demonstrate improved functional ability with FOTO limitation <=30% disability.  5. Pt to demonstrate improved  cervical ROM by 10% to allow pt to perform self care activities with increased ease.  6. Pt to demonstrate improved postural strength by a half grade to allow improved ADLs with increased ease.      Long Term Goals (12 Weeks):   1. Pt will report being independent with HEP for maintenance of improvements gained during therapy sessions  2. PT will report 50% reduction of pain of the back and BLE for ease with dressing and grooming activities.   3. Pt will demonstrate trunk and extremity strength to >=4+/5 without the provocation of pain for ease  with household chores  4. Pt will demonstrate appropriate upright posture without external cueing for ease with work related activities.   5. Pt to demonstrate improved functional ability with FOTO limitation <=20% disability.  6. Pt to demonstrate improved cervical ROM to WNL, R=L, to allow pt to perform self care with increased ease.  7. Pt will demonstrate >=4+/5 strength within the L scapula and shoulder for ease with house hold chores    PLAN     Continue with POC for neural mobility, flexibility, trunk strength/stabilization.    Haydee Mckeon, PT

## 2022-12-07 ENCOUNTER — CLINICAL SUPPORT (OUTPATIENT)
Dept: REHABILITATION | Facility: OTHER | Age: 57
End: 2022-12-07
Payer: COMMERCIAL

## 2022-12-07 DIAGNOSIS — M54.12 RADICULITIS OF LEFT CERVICAL REGION: ICD-10-CM

## 2022-12-07 DIAGNOSIS — M54.50 CHRONIC RIGHT-SIDED LOW BACK PAIN WITHOUT SCIATICA: Primary | ICD-10-CM

## 2022-12-07 DIAGNOSIS — G89.29 CHRONIC RIGHT-SIDED LOW BACK PAIN WITHOUT SCIATICA: Primary | ICD-10-CM

## 2022-12-07 PROCEDURE — 97140 MANUAL THERAPY 1/> REGIONS: CPT | Mod: PN | Performed by: PHYSICAL THERAPIST

## 2022-12-07 PROCEDURE — 97110 THERAPEUTIC EXERCISES: CPT | Mod: PN | Performed by: PHYSICAL THERAPIST

## 2022-12-13 ENCOUNTER — CLINICAL SUPPORT (OUTPATIENT)
Dept: REHABILITATION | Facility: OTHER | Age: 57
End: 2022-12-13
Payer: COMMERCIAL

## 2022-12-13 DIAGNOSIS — M54.50 CHRONIC RIGHT-SIDED LOW BACK PAIN WITHOUT SCIATICA: Primary | ICD-10-CM

## 2022-12-13 DIAGNOSIS — M54.12 RADICULITIS OF LEFT CERVICAL REGION: ICD-10-CM

## 2022-12-13 DIAGNOSIS — G89.29 CHRONIC RIGHT-SIDED LOW BACK PAIN WITHOUT SCIATICA: Primary | ICD-10-CM

## 2022-12-13 PROCEDURE — 97140 MANUAL THERAPY 1/> REGIONS: CPT | Mod: PN | Performed by: PHYSICAL THERAPIST

## 2022-12-13 PROCEDURE — 97110 THERAPEUTIC EXERCISES: CPT | Mod: PN | Performed by: PHYSICAL THERAPIST

## 2022-12-13 NOTE — PROGRESS NOTES
"[facility]   Physical Therapy Treatment Note     Name: Estela Vázquez  Clinic Number: [id number]    Therapy Diagnosis:   Encounter Diagnoses   Name Primary?    Chronic right-sided low back pain without sciatica Yes    Radiculitis of left cervical region      Physician: Semaj Cleveland MD    Visit Date: 12/13/2022    Physician Orders: PT Eval and Treat  per MD for shoulder pain = Scapular dyskinesia, Scapular stabilization - [place] protocol, 1-3x/week x 6-8 weeks with HEP  Medical Diagnosis from Referral: M47.816 (ICD-10-CM) - Lumbar spondylosis  Additional Dx by Nika Barreto MD: M25.512 (ICD-10-CM) - Left shoulder pain, unspecified chronicity      Evaluation Date: 11/28/2022  Authorization Period Expiration: 12/31/2022  Plan of Care Expiration: 2/28/2023  Progress Note Due: 12/28/2022  Visit # / Visits authorized: 5/ 21   FOTO: 1/3 on 11/28/22     Precautions: Standard    PTA Visit #: 0/5     Time In: 1418  Time Out: 1500  Total Billable Time: 42 minutes    SUBJECTIVE     Pt reports: she was noticing some flare up of upper back pain with HEP, calmed down with taking a break for a day or two. Continues with numbness down L UE to thumb, but pinky side has resolved. Numbness is transient, will come on for a few seconds and then resolve. Some days it's more frequent than others, sometimes lasts longer than others.   She was compliant with home exercise program.  Response to previous treatment: fair  Functional change: none    Pain: 3/10 (neck 3/10)  Location: right low back and hip (glute), L neck and UE    OBJECTIVE     Objective Measures updated at progress report unless specified.     Treatment     Estela received the treatments listed below:      therapeutic exercises to develop strength, endurance, ROM, flexibility, posture, and core stabilization for 25 minutes including:    **Exercises in BOLD performed today**    UQ:  Seated scap squeezes x 20 x 5" holds  Seated UT/LS stretches 3 x " "15" holds ea  Radial nerve glides 2x 10  Ulnar nerve glides 2x 10  1/2 foam   Pec stretch x 2'   Abd <> bear hugs x 10   Neymar OH flex x 10   SA punch x 10    supine chin tucks x 20 x 5" holds  chin tuck with neymar ER x 20 x GTB  chin tuck with neymar Abd x 20 x GTB  open books x 10 NEYMAR with GTB    Next visit (?): prone scapular series    LQ:   LTR 3 x 20" static holds NEYMAR  PPT 10 x 10"  PPT + glute set into bridge initiation x 10  SL reverse clams (pilates block between knees) 10 x 10"    Next visit (?): piriformis stretch, supine hip flexor stretch, SLR with pilates ring press, reverse crunch w/ PB, SL clams    manual therapy techniques: Joint mobilizations, Manual traction, Myofacial release, Soft tissue Mobilization, and Friction Massage were applied to the: neck, back for 17 minutes, includin min x Spring PAs T4-10, rib unilat/neymar rotational PA glides T4-8  15 min x Application of TDN: Pt educated on benefits and potential side effects of dry needling. Educated pt on benefits, precautions, side effects following TDN. Educated pt to use heat following treatment sessions if pt is experiencing pain or soreness. Pt verbalized good understanding of education.  Pt signed written consent to dry needling. Pt gave verbal consent for DN    Pt received dry needling to the below listed muscles using 30 and 40 mm needles.  L UT  L LS  L C3,  7  L suboccipitals (GB20)  Winding performed every 5 minutes during treatment.      neuromuscular re-education activities to improve: Balance, Coordination, Kinesthetic, Sense, Proprioception, and Posture for 00 minutes. The following activities were included:  None today.  Core activation series next visit?    therapeutic activities to improve functional performance for 00  minutes, including:  None today.      Modalities: MHP to CSP simultaneous with supine therex x 10 min    Patient Education and Home Exercises     Home Exercises Provided and Patient Education Provided     Education " provided:   - updated HEP 11/30/2022    Written Home Exercises Provided: Patient instructed to cont prior HEP. Exercises were reviewed and Estela was able to demonstrate them prior to the end of the session.  Estela demonstrated good  understanding of the education provided. See EMR under Patient Instructions for exercises provided during therapy sessions    ASSESSMENT     Overall good tolerance to all treatment today. Numbness that travels down anterior L UE to thumb is intermittent, unable to ellicit or reduce. While sitting at rest and providing subjective history pt noted twice that numbness came on and then dissipated within a few seconds. No change with nerve glide positions. Dry needling continued today with good soft tissue response at all insertion points, no adverse effects following treatment. Stiffness noted to upper and mid TSP with springing.       Estela Is progressing well towards her goals.   Pt prognosis is Good.     Pt will continue to benefit from skilled outpatient physical therapy to address the deficits listed in the problem list box on initial evaluation, provide pt/family education and to maximize pt's level of independence in the home and community environment.     Pt's spiritual, cultural and educational needs considered and pt agreeable to plan of care and goals.     Anticipated barriers to physical therapy: standard, chronicity of sx, multiple medical diagnoses, Hx of L ankle surgery, L shoulder surgery     Goals: updated 12/13/2022   Short Term Goals (6 Weeks):   1. Pt will report 20% reduction in pain of the lumbar spine and BLE for ease with ADL's. Progressing, not met 12/13/2022   2. PT will demonstrate improved trunk strength by a half grade in for ease with upright posture during standing activities. Progressing, not met 12/13/2022    3. Pt will demonstrate improved lumbar spine ROM in all directions by a half grade for ease with bending activities. Progressing, not met 12/13/2022     4. Pt to demonstrate improved functional ability with FOTO limitation <=30% disability. Progressing, not met 12/13/2022   5. Pt to demonstrate improved  cervical ROM by 10% to allow pt to perform self care activities with increased ease. Progressing, not met 12/13/2022   6. Pt to demonstrate improved postural strength by a half grade to allow improved ADLs with increased ease. Progressing, not met 12/13/2022      Long Term Goals (12 Weeks):   1. Pt will report being independent with HEP for maintenance of improvements gained during therapy sessions. Progressing, not met 12/13/2022   2. PT will report 50% reduction of pain of the back and BLE for ease with dressing and grooming activities. Progressing, not met 12/13/2022   3. Pt will demonstrate trunk and extremity strength to >=4+/5 without the provocation of pain for ease with household chores. Progressing, not met 12/13/2022   4. Pt will demonstrate appropriate upright posture without external cueing for ease with work related activities. Progressing, not met 12/13/2022   5. Pt to demonstrate improved functional ability with FOTO limitation <=20% disability. Progressing, not met 12/13/2022   6. Pt to demonstrate improved cervical ROM to WNL, R=L, to allow pt to perform self care with increased ease. Progressing, not met 12/13/2022   7. Pt will demonstrate >=4+/5 strength within the L scapula and shoulder for ease with house hold chores. Progressing, not met 12/13/2022     PLAN     Continue with POC for neural mobility, flexibility, trunk strength/stabilization.    Meaghan Prado, PT

## 2022-12-15 NOTE — PROGRESS NOTES
"[facility]   Physical Therapy Treatment Note     Name: Estela Vázquez  Clinic Number: [id number]    Therapy Diagnosis:   Encounter Diagnoses   Name Primary?    Chronic right-sided low back pain without sciatica Yes    Radiculitis of left cervical region        Physician: Semaj Cleveland MD    Visit Date: 12/16/2022    Physician Orders: PT Eval and Treat  per MD for shoulder pain = Scapular dyskinesia, Scapular stabilization - [place] protocol, 1-3x/week x 6-8 weeks with HEP  Medical Diagnosis from Referral: M47.816 (ICD-10-CM) - Lumbar spondylosis  Additional Dx by Nika Barreto MD: M25.512 (ICD-10-CM) - Left shoulder pain, unspecified chronicity      Evaluation Date: 11/28/2022  Authorization Period Expiration: 12/31/2022  Plan of Care Expiration: 2/28/2023  Progress Note Due: 12/28/2022  Visit # / Visits authorized: 6/ 21   FOTO: 1/3 on 11/28/22     Precautions: Standard    PTA Visit #: 0/5     Time In: 9:00am  Time Out: 9:45am  Total Billable Time: 45 minutes 1:1    SUBJECTIVE     Pt reports: overall numbness down L arm is better, is no longer on the backside of the arm, but continues to radiate down the lateral arm to the thumb.    She was compliant with home exercise program.  Response to previous treatment: fair  Functional change: none    Pain: 3/10 (neck 3/10)  Location: right low back and hip (glute), L neck and UE    OBJECTIVE     Objective Measures updated at progress report unless specified.     Treatment     Estela received the treatments listed below:      therapeutic exercises to develop strength, endurance, ROM, flexibility, posture, and core stabilization for 38 minutes including:    **Exercises in BOLD performed today**    Upper Quarter:  Seated scap squeezes x 20 x 5" holds  Seated UT/LS stretches 3 x 15" holds ea  Radial nerve glides 2x 10  Ulnar nerve glides 2x 10  +self first rib inferior glide x 10 deep breaths (using sheet)  +self 3-way scalene stretch 5 x 10" ea " "direction (using sheet)  1/2 foam   Pec stretch x 2'   Abd <> bear hugs x 10   Neymar OH flex x 10   SA punch x 10    supine chin tucks x 20 x 5" holds  chin tuck with neymar ER x 30 x GTB  chin tuck with neymar Abd x 30 x GTB  open books x 20 NEYMAR with GTB    +prone scap retractions: 15 x 5" holds  +prone Is: 15 x 5" holds    Next visit (?): progress prone scapular series    Lower Quarter:   LTR 3 x 20" static holds NEYMAR  PPT 10 x 10"  PPT + glute set into bridge initiation x 10  SL reverse clams (pilates block between knees) 10 x 10"    Next visit (?): piriformis stretch, supine hip flexor stretch, SLR with pilates ring press, reverse crunch w/ PB, SL clams    manual therapy techniques: Joint mobilizations, Manual traction, Myofacial release, Soft tissue Mobilization, and Friction Massage were applied to the: neck, back for 7 minutes, includin min x Spring PAs T4-10, rib unilat/neymar rotational PA glides T4-8  00 min x Application of TDN: Pt educated on benefits and potential side effects of dry needling. Educated pt on benefits, precautions, side effects following TDN. Educated pt to use heat following treatment sessions if pt is experiencing pain or soreness. Pt verbalized good understanding of education.  Pt signed written consent to dry needling. Pt gave verbal consent for DN    Pt received dry needling to the below listed muscles using 30 and 40 mm needles.  L UT  L LS  L C3,  7  L suboccipitals (GB20)  Winding performed every 5 minutes during treatment.      neuromuscular re-education activities to improve: Balance, Coordination, Kinesthetic, Sense, Proprioception, and Posture for 00 minutes. The following activities were included:  None today.  Core activation series next visit?    therapeutic activities to improve functional performance for 00  minutes, including:  None today.      Modalities: MHP to CSP simultaneous with supine therex x 00 min    Patient Education and Home Exercises     Home Exercises Provided " and Patient Education Provided     Education provided:   - updated HEP 11/30/2022  - added scalene stretch to HEP, 12/16/22    Written Home Exercises Provided: Patient instructed to cont prior HEP. Exercises were reviewed and Estela was able to demonstrate them prior to the end of the session.  Estela demonstrated good  understanding of the education provided. See EMR under Patient Instructions for exercises provided during therapy sessions    ASSESSMENT     Progressed scapular motor control and strengthening today in prone position against gravity. Intermittent TC required for appropriate scapular placement and full mm activation. Pt notes fatigue in BUE after 1 set, UA to progress to 2nd set. Progress as tolerated next visit.   Pt presents with decreased first rib mobility as well as poor scalene flexibility on left, with recreation of neural sx down the LUE with anterior scalene stretch with first rib depression. Added scalene stretch to HEP today and encouraged use as an alternative to nerve glides if sx not resolving.  Defer dry needling due to time constraints. Resume prn in the future.    Estela Is progressing well towards her goals.   Pt prognosis is Good.     Pt will continue to benefit from skilled outpatient physical therapy to address the deficits listed in the problem list box on initial evaluation, provide pt/family education and to maximize pt's level of independence in the home and community environment.     Pt's spiritual, cultural and educational needs considered and pt agreeable to plan of care and goals.     Anticipated barriers to physical therapy: standard, chronicity of sx, multiple medical diagnoses, Hx of L ankle surgery, L shoulder surgery     Goals: updated 12/16/2022   Short Term Goals (6 Weeks):   1. Pt will report 20% reduction in pain of the lumbar spine and BLE for ease with ADL's. Progressing, not met 12/16/2022   2. PT will demonstrate improved trunk strength by a half grade in for  ease with upright posture during standing activities. Progressing, not met 12/16/2022    3. Pt will demonstrate improved lumbar spine ROM in all directions by a half grade for ease with bending activities. Progressing, not met 12/16/2022    4. Pt to demonstrate improved functional ability with FOTO limitation <=30% disability. Progressing, not met 12/16/2022   5. Pt to demonstrate improved  cervical ROM by 10% to allow pt to perform self care activities with increased ease. Progressing, not met 12/16/2022   6. Pt to demonstrate improved postural strength by a half grade to allow improved ADLs with increased ease. Progressing, not met 12/16/2022      Long Term Goals (12 Weeks):   1. Pt will report being independent with HEP for maintenance of improvements gained during therapy sessions. Progressing, not met 12/16/2022   2. PT will report 50% reduction of pain of the back and BLE for ease with dressing and grooming activities. Progressing, not met 12/16/2022   3. Pt will demonstrate trunk and extremity strength to >=4+/5 without the provocation of pain for ease with household chores. Progressing, not met 12/16/2022   4. Pt will demonstrate appropriate upright posture without external cueing for ease with work related activities. Progressing, not met 12/16/2022   5. Pt to demonstrate improved functional ability with FOTO limitation <=20% disability. Progressing, not met 12/16/2022   6. Pt to demonstrate improved cervical ROM to WNL, R=L, to allow pt to perform self care with increased ease. Progressing, not met 12/16/2022   7. Pt will demonstrate >=4+/5 strength within the L scapula and shoulder for ease with house hold chores. Progressing, not met 12/16/2022     PLAN     Continue with POC for neural mobility, flexibility, trunk strength/stabilization.    Haydee Mckeon, PT

## 2022-12-16 ENCOUNTER — CLINICAL SUPPORT (OUTPATIENT)
Dept: REHABILITATION | Facility: OTHER | Age: 57
End: 2022-12-16
Payer: COMMERCIAL

## 2022-12-16 DIAGNOSIS — M54.50 CHRONIC RIGHT-SIDED LOW BACK PAIN WITHOUT SCIATICA: Primary | ICD-10-CM

## 2022-12-16 DIAGNOSIS — G89.29 CHRONIC RIGHT-SIDED LOW BACK PAIN WITHOUT SCIATICA: Primary | ICD-10-CM

## 2022-12-16 DIAGNOSIS — M54.12 RADICULITIS OF LEFT CERVICAL REGION: ICD-10-CM

## 2022-12-16 PROCEDURE — 97110 THERAPEUTIC EXERCISES: CPT | Mod: PN

## 2022-12-19 ENCOUNTER — CLINICAL SUPPORT (OUTPATIENT)
Dept: REHABILITATION | Facility: OTHER | Age: 57
End: 2022-12-19
Payer: COMMERCIAL

## 2022-12-19 ENCOUNTER — PATIENT MESSAGE (OUTPATIENT)
Dept: SPORTS MEDICINE | Facility: CLINIC | Age: 57
End: 2022-12-19
Payer: COMMERCIAL

## 2022-12-19 DIAGNOSIS — M54.12 RADICULITIS OF LEFT CERVICAL REGION: ICD-10-CM

## 2022-12-19 DIAGNOSIS — M54.50 CHRONIC RIGHT-SIDED LOW BACK PAIN WITHOUT SCIATICA: Primary | ICD-10-CM

## 2022-12-19 DIAGNOSIS — G89.29 CHRONIC RIGHT-SIDED LOW BACK PAIN WITHOUT SCIATICA: Primary | ICD-10-CM

## 2022-12-19 PROCEDURE — 97140 MANUAL THERAPY 1/> REGIONS: CPT | Mod: PN

## 2022-12-19 PROCEDURE — 97110 THERAPEUTIC EXERCISES: CPT | Mod: PN

## 2022-12-19 NOTE — PROGRESS NOTES
"[facility]   Physical Therapy Treatment Note     Name: Estela Vázquez  Clinic Number: [id number]    Therapy Diagnosis:   Encounter Diagnoses   Name Primary?    Chronic right-sided low back pain without sciatica Yes    Radiculitis of left cervical region        Physician: Semaj Cleveland MD    Visit Date: 12/19/2022    Physician Orders: PT Eval and Treat  per MD for shoulder pain = Scapular dyskinesia, Scapular stabilization - [place] protocol, 1-3x/week x 6-8 weeks with HEP  Medical Diagnosis from Referral: M47.816 (ICD-10-CM) - Lumbar spondylosis  Additional Dx by Nika Barreto MD: M25.512 (ICD-10-CM) - Left shoulder pain, unspecified chronicity      Evaluation Date: 11/28/2022  Authorization Period Expiration: 12/31/2022  Plan of Care Expiration: 2/28/2023  Progress Note Due: 12/28/2022  Visit # / Visits authorized: 7/ 21   FOTO: 1/3 on 11/28/22     Precautions: Standard    PTA Visit #: 0/5     Time In: 9:15am  Time Out: 10:00am  Total Billable Time: 45 minutes 1:1    SUBJECTIVE     Pt reports: soreness a day after last visit but arm pain is better. Attempted to do new stretch for scalenes at home; "I really like this one, it helps!"    She was compliant with home exercise program.  Response to previous treatment: fair  Functional change: none    Pain: 3/10 (neck 3/10)  Location: right low back and hip (glute), L neck and UE    OBJECTIVE     Objective Measures updated at progress report unless specified.     Treatment     Estela received the treatments listed below:      therapeutic exercises to develop strength, endurance, ROM, flexibility, posture, and core stabilization for 30 minutes including:    **Exercises in BOLD performed today**    Upper Quarter:  Seated scap squeezes x 20 x 5" holds  Seated UT/LS stretches 3 x 15" holds ea  Radial nerve glides 2x 10  Ulnar nerve glides 2x 10  self first rib inferior glide x 10 deep breaths (using sheet)  self 3-way scalene stretch x 10 deep " "breaths ea direction (using sheet)  1/2 foam   Pec stretch x 2'   Abd <> bear hugs x 10   Neymar OH flex x 10   SA punch x 10    supine chin tucks x 20 x 5" holds  chin tuck with neymar ER x 30 x GTB  chin tuck with neymar Abd x 30 x GTB  open books x 20 NEYMAR with GTB    prone scap retractions: 15 x 5" holds  prone Is: 15 x 5" holds    Next visit (?): progress prone scapular series    Lower Quarter:   LTR 3 x 20" static holds NEYMAR  PPT 10 x 10"  PPT + glute set into bridge initiation x 10  SL reverse clams (pilates block between knees) 10 x 10"    Next visit (?): piriformis stretch, supine hip flexor stretch, SLR with pilates ring press, reverse crunch w/ PB, SL clams    manual therapy techniques: Joint mobilizations, Manual traction, Myofacial release, Soft tissue Mobilization, and Friction Massage were applied to the: neck, back for 15 minutes, includin min x Spring PAs T4-10, rib unilat/neymar rotational PA glides T4-8  15 min x Application of TDN: Pt educated on benefits and potential side effects of dry needling. Educated pt on benefits, precautions, side effects following TDN. Educated pt to use heat following treatment sessions if pt is experiencing pain or soreness. Pt verbalized good understanding of education.  Pt signed written consent to dry needling. Pt gave verbal consent for DN    Pt received dry needling to the below listed muscles using 30 and 40 mm needles.  L UT (distal)  L LS    L C3, 5- 7  L scalenes  L suboccipitals (GB20)  Winding performed every 5 minutes during treatment.      neuromuscular re-education activities to improve: Balance, Coordination, Kinesthetic, Sense, Proprioception, and Posture for 00 minutes. The following activities were included:  None today.  Core activation series next visit?    therapeutic activities to improve functional performance for 00  minutes, including:  None today.      Modalities: MHP to CSP simultaneous with supine therex x 10 min - simultaneous with supine " therex    Patient Education and Home Exercises     Home Exercises Provided and Patient Education Provided     Education provided:   - updated HEP 11/30/2022  - added scalene stretch to HEP, 12/16/22    Written Home Exercises Provided: Patient instructed to cont prior HEP. Exercises were reviewed and Estela was able to demonstrate them prior to the end of the session.  Estela demonstrated good  understanding of the education provided. See EMR under Patient Instructions for exercises provided during therapy sessions    ASSESSMENT     Resumed dry needling today per pt request as she notes increased tenderness over L shoulder. Good tolerance with dry needling with notable improvement in pain modulation and mobility afterwards.    Estela Is progressing well towards her goals.   Pt prognosis is Good.     Pt will continue to benefit from skilled outpatient physical therapy to address the deficits listed in the problem list box on initial evaluation, provide pt/family education and to maximize pt's level of independence in the home and community environment.     Pt's spiritual, cultural and educational needs considered and pt agreeable to plan of care and goals.     Anticipated barriers to physical therapy: standard, chronicity of sx, multiple medical diagnoses, Hx of L ankle surgery, L shoulder surgery     Goals: updated 12/19/2022   Short Term Goals (6 Weeks):   1. Pt will report 20% reduction in pain of the lumbar spine and BLE for ease with ADL's. Progressing, not met 12/19/2022   2. PT will demonstrate improved trunk strength by a half grade in for ease with upright posture during standing activities. Progressing, not met 12/19/2022    3. Pt will demonstrate improved lumbar spine ROM in all directions by a half grade for ease with bending activities. Progressing, not met 12/19/2022    4. Pt to demonstrate improved functional ability with FOTO limitation <=30% disability. Progressing, not met 12/19/2022   5. Pt to  demonstrate improved  cervical ROM by 10% to allow pt to perform self care activities with increased ease. Progressing, not met 12/19/2022   6. Pt to demonstrate improved postural strength by a half grade to allow improved ADLs with increased ease. Progressing, not met 12/19/2022      Long Term Goals (12 Weeks):   1. Pt will report being independent with HEP for maintenance of improvements gained during therapy sessions. Progressing, not met 12/19/2022   2. PT will report 50% reduction of pain of the back and BLE for ease with dressing and grooming activities. Progressing, not met 12/19/2022   3. Pt will demonstrate trunk and extremity strength to >=4+/5 without the provocation of pain for ease with household chores. Progressing, not met 12/19/2022   4. Pt will demonstrate appropriate upright posture without external cueing for ease with work related activities. Progressing, not met 12/19/2022   5. Pt to demonstrate improved functional ability with FOTO limitation <=20% disability. Progressing, not met 12/19/2022   6. Pt to demonstrate improved cervical ROM to WNL, R=L, to allow pt to perform self care with increased ease. Progressing, not met 12/19/2022   7. Pt will demonstrate >=4+/5 strength within the L scapula and shoulder for ease with house hold chores. Progressing, not met 12/19/2022     PLAN     Continue with POC for neural mobility, flexibility, trunk strength/stabilization.    Haydee Mckeon, PT

## 2022-12-20 DIAGNOSIS — M25.512 LEFT SHOULDER PAIN, UNSPECIFIED CHRONICITY: Primary | ICD-10-CM

## 2022-12-20 RX ORDER — CYCLOBENZAPRINE HCL 10 MG
10 TABLET ORAL NIGHTLY PRN
Qty: 30 TABLET | Refills: 0 | Status: SHIPPED | OUTPATIENT
Start: 2022-12-20 | End: 2023-01-19

## 2022-12-21 ENCOUNTER — CLINICAL SUPPORT (OUTPATIENT)
Dept: REHABILITATION | Facility: OTHER | Age: 57
End: 2022-12-21
Payer: COMMERCIAL

## 2022-12-21 DIAGNOSIS — G89.29 CHRONIC RIGHT-SIDED LOW BACK PAIN WITHOUT SCIATICA: Primary | ICD-10-CM

## 2022-12-21 DIAGNOSIS — M54.50 CHRONIC RIGHT-SIDED LOW BACK PAIN WITHOUT SCIATICA: Primary | ICD-10-CM

## 2022-12-21 DIAGNOSIS — M54.12 RADICULITIS OF LEFT CERVICAL REGION: ICD-10-CM

## 2022-12-21 PROCEDURE — 97530 THERAPEUTIC ACTIVITIES: CPT | Mod: PN

## 2022-12-21 PROCEDURE — 97110 THERAPEUTIC EXERCISES: CPT | Mod: PN

## 2022-12-21 PROCEDURE — 97112 NEUROMUSCULAR REEDUCATION: CPT | Mod: PN

## 2022-12-21 NOTE — PROGRESS NOTES
"[facility]   Physical Therapy Treatment Note     Name: Estela Vázquez  Clinic Number: [id number]    Therapy Diagnosis:   Encounter Diagnoses   Name Primary?    Chronic right-sided low back pain without sciatica Yes    Radiculitis of left cervical region          Physician: Semaj Cleveland MD    Visit Date: 12/21/2022    Physician Orders: PT Eval and Treat  per MD for shoulder pain = Scapular dyskinesia, Scapular stabilization - [place] protocol, 1-3x/week x 6-8 weeks with HEP  Medical Diagnosis from Referral: M47.816 (ICD-10-CM) - Lumbar spondylosis  Additional Dx by Nika Barreto MD: M25.512 (ICD-10-CM) - Left shoulder pain, unspecified chronicity      Evaluation Date: 11/28/2022  Authorization Period Expiration: 12/31/2022  Plan of Care Expiration: 2/28/2023  Progress Note Due: 12/28/2022  Visit # / Visits authorized: 7/ 21   FOTO: 1/3 on 11/28/22     Precautions: Standard    PTA Visit #: 0/5     Time In: 8:30am  Time Out: 9:25am  Total Billable Time: 45 minutes 1:1    SUBJECTIVE     Pt reports: increased soreness from last visit. Would like to defer DN today.  She was compliant with home exercise program.  Response to previous treatment: fair  Functional change: none    Pain: 3/10 (neck 3/10)  Location: right low back and hip (glute), L neck and UE    OBJECTIVE     Objective Measures updated at progress report unless specified.     Treatment     Estela received the treatments listed below:      **Exercises in BOLD performed today**     therapeutic exercises to develop strength, endurance, ROM, flexibility, posture, and core stabilization for 29 minutes including:    Ulnar nerve glides 2x 10  self first rib inferior glide x 10 deep breaths (using sheet)  self 3-way scalene stretch x 10 deep breaths ea direction (using sheet)  Seated UT/LS stretches 3 x 15" holds ea  supine chin tucks x 20 x 5" holds  chin tuck with randy ER x 20 x GTB - change to sitting against 1/2 foam  chin tuck with " "claude Abd x 20 x GTB - change to sitting against 1/2 foam  open books x 20 CLAUDE with GTB    prone scap retractions: 20 x 5" holds  prone Is: 20 x 5" holds        manual therapy techniques: Joint mobilizations, Manual traction, Myofacial release, Soft tissue Mobilization, and Friction Massage were applied to the: neck, back for 10 minutes, including:  10 min x vacuum/cupping STM with manual therapy techniques was performed to L Upper trap/levator scap to decrease muscle tightness, increase circulation and promote healing process. The pt's skin was monitored for redness adjusting pressure as needed. The pt was instructed in possible side effects of bruising and/or soreness. Pt verbalized understanding. -- performed simultaneous with seated TE today    00 min x Dry needling - see last note done on 12/19/22    neuromuscular re-education activities to improve: Balance, Coordination, Kinesthetic, Sense, Proprioception, and Posture for 8 minutes. The following activities were included:  +prone Ts: 20 x 5"  +prone Ys: 20 x 5"    therapeutic activities to improve functional performance for 8  minutes, including:  +standing rows x 20 x GTB  +standing SALPD x 20 x RTB      Modalities: MHP to CSP simultaneous with supine therex x 10 min at end of session    Patient Education and Home Exercises     Home Exercises Provided and Patient Education Provided     Education provided:   - updated HEP 11/30/2022  - added scalene stretch to HEP, 12/16/22    Written Home Exercises Provided: Patient instructed to cont prior HEP. Exercises were reviewed and Estela was able to demonstrate them prior to the end of the session.  Estela demonstrated good  understanding of the education provided. See EMR under Patient Instructions for exercises provided during therapy sessions    ASSESSMENT     Defer dry needling per pt request. Initiated progression of prone scapular retractions with T, Y arm lifts with fatigue in periscapular region but self reports " "increased "tenderness" over L shoulder. Added cupping simultaneous with seated resistance exercises for dynamic myofascial mobilization. Pt notes improved pain modulation following cupping and modalities.    Estela Is progressing well towards her goals.   Pt prognosis is Good.     Pt will continue to benefit from skilled outpatient physical therapy to address the deficits listed in the problem list box on initial evaluation, provide pt/family education and to maximize pt's level of independence in the home and community environment.     Pt's spiritual, cultural and educational needs considered and pt agreeable to plan of care and goals.     Anticipated barriers to physical therapy: standard, chronicity of sx, multiple medical diagnoses, Hx of L ankle surgery, L shoulder surgery     Goals: updated 12/21/2022   Short Term Goals (6 Weeks):   1. Pt will report 20% reduction in pain of the lumbar spine and BLE for ease with ADL's. Progressing, not met 12/21/2022   2. PT will demonstrate improved trunk strength by a half grade in for ease with upright posture during standing activities. Progressing, not met 12/21/2022    3. Pt will demonstrate improved lumbar spine ROM in all directions by a half grade for ease with bending activities. Progressing, not met 12/21/2022    4. Pt to demonstrate improved functional ability with FOTO limitation <=30% disability. Progressing, not met 12/21/2022   5. Pt to demonstrate improved  cervical ROM by 10% to allow pt to perform self care activities with increased ease. Progressing, not met 12/21/2022   6. Pt to demonstrate improved postural strength by a half grade to allow improved ADLs with increased ease. Progressing, not met 12/21/2022      Long Term Goals (12 Weeks):   1. Pt will report being independent with HEP for maintenance of improvements gained during therapy sessions. Progressing, not met 12/21/2022   2. PT will report 50% reduction of pain of the back and BLE for ease with " dressing and grooming activities. Progressing, not met 12/21/2022   3. Pt will demonstrate trunk and extremity strength to >=4+/5 without the provocation of pain for ease with household chores. Progressing, not met 12/21/2022   4. Pt will demonstrate appropriate upright posture without external cueing for ease with work related activities. Progressing, not met 12/21/2022   5. Pt to demonstrate improved functional ability with FOTO limitation <=20% disability. Progressing, not met 12/21/2022   6. Pt to demonstrate improved cervical ROM to WNL, R=L, to allow pt to perform self care with increased ease. Progressing, not met 12/21/2022   7. Pt will demonstrate >=4+/5 strength within the L scapula and shoulder for ease with house hold chores. Progressing, not met 12/21/2022     PLAN     Continue with POC for neural mobility, flexibility, trunk strength/stabilization.    Haydee Mckeon, PT

## 2022-12-26 NOTE — PROGRESS NOTES
"[facility]   Physical Therapy Treatment Note     Name: Estela Vázquez  Clinic Number: [id number]    Therapy Diagnosis:   Encounter Diagnoses   Name Primary?    Chronic right-sided low back pain without sciatica Yes    Radiculitis of left cervical region            Physician: Semaj Cleveland MD    Visit Date: 12/27/2022    Physician Orders: PT Eval and Treat  per MD for shoulder pain = Scapular dyskinesia, Scapular stabilization - [place] protocol, 1-3x/week x 6-8 weeks with HEP  Medical Diagnosis from Referral: M47.816 (ICD-10-CM) - Lumbar spondylosis  Additional Dx by Nika Barreto MD: M25.512 (ICD-10-CM) - Left shoulder pain, unspecified chronicity      Evaluation Date: 11/28/2022  Authorization Period Expiration: 12/31/2022  Plan of Care Expiration: 2/28/2023  Progress Note Due: 12/28/2022  Visit # / Visits authorized: 7/ 21   FOTO: 1/3 on 11/28/22     Precautions: Standard    PTA Visit #: 0/5     Time In: 11:15am  Time Out: 12:00am  Total Billable Time: 45 minutes 1:1    SUBJECTIVE     Pt reports: increased soreness from last visit. Would like to defer DN today. Feels that arm pain is getting worse since last visit and now numbness feels like it "covers the whole hand," but her lower back is feeling better.  She was compliant with home exercise program.  Response to previous treatment: fair  Functional change: none    Pain: 3/10 (neck 3/10)  Location: right low back and hip (glute), L neck and UE    OBJECTIVE     Objective Measures updated at progress report unless specified.     Updated 12/27/22    Lower quarter screen:     Sitting posture: increased FHP, rounded shoulders, increased TSP kyphosis throughout - req melvin Q VC for upright posture    Thoracic/Lumbar AROM: Pain/Dysfunction with Movement:   Flexion WNL, fingertips to toes, poor lumbar curve reversal, increased mid thoracic flexion, no pain   Extension Max eastman, decreased lumbar curvature, c/o stiffness   Right side bending " "Mod eastman, fingertips 2" above knee joint line, no pain   Left side bending Min eastman, fingertips to knee joint line, tension on R   Right rotation Mod-max eastman, stiffness but no pain   Left rotation Mod-max eastman, stiffness but no pain         Lower Extremity Strength  Right LE   Left LE     Hip flexion: 4-/5 Hip flexion: 4-/5   Hip extension:  4/5 Hip extension: 4/5   Hip abduction: 4-/5 Hip abduction: 4-/5   Hip adduction:  5/5 Hip adduction:  5/5   Knee Flexion 5/5 Knee Flexion 5/5   Knee Extension 4+/5 Knee Extension 4+/5      Joint Mobility: hypo throughout LSP, TSP with firm EF during spring PAs of TSP, mm tension on R during LSP rotational glides          Upper quarter screen:        CERVICAL SPINE AROM:   Flexion: WFL, chin to chest, increased LUE pain   Extension: mod eastman, increased LUE pain   Right Sidebend: Max eastman, <20 deg, c/o pulling in L   Left Sidebend: Max eastman, <15 deg, c/o pinch pain on L   Right Rotation: 45 deg, c/o pain in L   Left Rotation: 50 deg, c/o stiffness      Deep Neck Flexor: poor        UPPER EXTREMITY STRENGTH:    Right Left   Shoulder Flexion 4+/5 5/5   Shoulder Abduction 5/5 5/5   Shoulder Internal Rotation 5/5 5/5   Shoulder External Rotation 5/5 4+/5   Serratus anterior 4+/5 4/5   Upper trap 5/5 5/5   Mid trap/rhomboids 4/5 3+/5   Low trap 4-/5 3+/5    (lbs)  3 trials with dynomometer 57, 59, 56 45, 40, 36         Flexibility:   Pectoralis Major/ Minor: hypo  Latissimus Dorsi: NT  Subscapularis: NT  Upper Traps: hypo  Levator Scap: hypo     Joint Mobility: mod hypo upper TSP, SCS       Treatment     Estela received the treatments listed below:      **Exercises in BOLD performed today**     therapeutic exercises to develop strength, endurance, ROM, flexibility, posture, and core stabilization for 45 minutes including:    Reassessment x 20'    Ulnar nerve glides 2x  - NP  self first rib inferior glide x 10 deep breaths (using sheet)  self 3-way scalene stretch x 10 deep breaths ea " "direction (using sheet)  Seated UT/LS stretches 3 x 15" holds ea  supine chin tucks x 20 x 5" holds  chin tuck with claude ER x 20 x GTB - change to sitting against 1/2 foam  chin tuck with claude Abd x 20 x GTB - change to sitting against 1/2 foam  open books x 5 CLAUDE - progressed to standing today  +seated thoracic extensions x 10    prone scap retractions: 20 x 5" holds  prone Is: 20 x 5" holds        manual therapy techniques: Joint mobilizations, Manual traction, Myofacial release, Soft tissue Mobilization, and Friction Massage were applied to the: neck, back for 00 minutes, includin min x vacuum/cupping STM with manual therapy techniques was performed to L Upper trap/levator scap to decrease muscle tightness, increase circulation and promote healing process. The pt's skin was monitored for redness adjusting pressure as needed. The pt was instructed in possible side effects of bruising and/or soreness. Pt verbalized understanding. -- performed simultaneous with seated TE today    00 min x Dry needling - see last note done on 22    neuromuscular re-education activities to improve: Balance, Coordination, Kinesthetic, Sense, Proprioception, and Posture for 00 minutes. The following activities were included:  +prone Ts: 20 x 5"  +prone Ys: 20 x 5"    therapeutic activities to improve functional performance for 00 minutes, including:  +standing rows x 20 x GTB  +standing SALPD x 20 x RTB      Modalities: MHP to CSP simultaneous with supine therex x 00 min at end of session    Patient Education and Home Exercises     Home Exercises Provided and Patient Education Provided     Education provided:   - updated HEP 2022  - added scalene stretch to HEP, 22    Written Home Exercises Provided: Patient instructed to cont prior HEP. Exercises were reviewed and Estela was able to demonstrate them prior to the end of the session.  Estela demonstrated good  understanding of the education provided. See EMR under " Patient Instructions for exercises provided during therapy sessions    ASSESSMENT     Reassessment performed as pt notes worsening of sx down the LUE. Pt presents with continued flexed posture with increased fulcruming at CTJ with possible traction on deep spinal structures. Heavy edu on importance of upright posture, frequent stretching throughout the day, including use of open books standing and seated thoracic extensions to promote pec flexibility, thoracic opening. Pt notes improvement in spine stiffness with thoracic extensions with slight improvement in UE sx. Advised pt F/U with referring MD, possibly seeing spine specialist for further diagnostic testing.    Estela Is progressing well towards her goals.   Pt prognosis is Good.     Pt will continue to benefit from skilled outpatient physical therapy to address the deficits listed in the problem list box on initial evaluation, provide pt/family education and to maximize pt's level of independence in the home and community environment.     Pt's spiritual, cultural and educational needs considered and pt agreeable to plan of care and goals.     Anticipated barriers to physical therapy: standard, chronicity of sx, multiple medical diagnoses, Hx of L ankle surgery, L shoulder surgery     Goals: updated 12/27/2022   Short Term Goals (6 Weeks):   1. Pt will report 20% reduction in pain of the lumbar spine and BLE for ease with ADL's. Progressing, not met 12/27/2022   2. PT will demonstrate improved trunk strength by a half grade in for ease with upright posture during standing activities. Progressing, not met 12/27/2022    3. Pt will demonstrate improved lumbar spine ROM in all directions by a half grade for ease with bending activities. Progressing, not met 12/27/2022    4. Pt to demonstrate improved functional ability with FOTO limitation <=30% disability. Progressing, not met 12/27/2022   5. Pt to demonstrate improved  cervical ROM by 10% to allow pt to perform  self care activities with increased ease. Progressing, not met 12/27/2022   6. Pt to demonstrate improved postural strength by a half grade to allow improved ADLs with increased ease. Progressing, not met 12/27/2022      Long Term Goals (12 Weeks):   1. Pt will report being independent with HEP for maintenance of improvements gained during therapy sessions. Progressing, not met 12/27/2022   2. PT will report 50% reduction of pain of the back and BLE for ease with dressing and grooming activities. Progressing, not met 12/27/2022   3. Pt will demonstrate trunk and extremity strength to >=4+/5 without the provocation of pain for ease with household chores. Progressing, not met 12/27/2022   4. Pt will demonstrate appropriate upright posture without external cueing for ease with work related activities. Progressing, not met 12/27/2022   5. Pt to demonstrate improved functional ability with FOTO limitation <=20% disability. Progressing, not met 12/27/2022   6. Pt to demonstrate improved cervical ROM to WNL, R=L, to allow pt to perform self care with increased ease. Progressing, not met 12/27/2022   7. Pt will demonstrate >=4+/5 strength within the L scapula and shoulder for ease with house hold chores. Progressing, not met 12/27/2022     PLAN     Continue with POC for neural mobility, flexibility, trunk strength/stabilization.    Haydee Mckeon, PT

## 2022-12-27 ENCOUNTER — CLINICAL SUPPORT (OUTPATIENT)
Dept: REHABILITATION | Facility: OTHER | Age: 57
End: 2022-12-27
Payer: COMMERCIAL

## 2022-12-27 DIAGNOSIS — G89.29 CHRONIC RIGHT-SIDED LOW BACK PAIN WITHOUT SCIATICA: Primary | ICD-10-CM

## 2022-12-27 DIAGNOSIS — M54.12 RADICULITIS OF LEFT CERVICAL REGION: ICD-10-CM

## 2022-12-27 DIAGNOSIS — M54.50 CHRONIC RIGHT-SIDED LOW BACK PAIN WITHOUT SCIATICA: Primary | ICD-10-CM

## 2022-12-27 PROCEDURE — 97110 THERAPEUTIC EXERCISES: CPT | Mod: PN

## 2023-01-04 ENCOUNTER — CLINICAL SUPPORT (OUTPATIENT)
Dept: REHABILITATION | Facility: OTHER | Age: 58
End: 2023-01-04
Payer: COMMERCIAL

## 2023-01-04 DIAGNOSIS — G89.29 CHRONIC RIGHT-SIDED LOW BACK PAIN WITHOUT SCIATICA: Primary | ICD-10-CM

## 2023-01-04 DIAGNOSIS — M54.50 CHRONIC RIGHT-SIDED LOW BACK PAIN WITHOUT SCIATICA: Primary | ICD-10-CM

## 2023-01-04 DIAGNOSIS — M54.12 RADICULITIS OF LEFT CERVICAL REGION: ICD-10-CM

## 2023-01-04 PROCEDURE — 97112 NEUROMUSCULAR REEDUCATION: CPT | Mod: PN

## 2023-01-04 PROCEDURE — 97530 THERAPEUTIC ACTIVITIES: CPT | Mod: PN

## 2023-01-04 PROCEDURE — 97110 THERAPEUTIC EXERCISES: CPT | Mod: PN

## 2023-01-04 NOTE — PROGRESS NOTES
"[facility]   Physical Therapy Treatment Note     Name: Estela Vázquez  Clinic Number: [id number]    Therapy Diagnosis:   Encounter Diagnoses   Name Primary?    Chronic right-sided low back pain without sciatica Yes    Radiculitis of left cervical region            Physician: Semaj Cleveland MD    Visit Date: 1/4/2023    Physician Orders: PT Eval and Treat  per MD for shoulder pain = Scapular dyskinesia, Scapular stabilization - [place] protocol, 1-3x/week x 6-8 weeks with HEP  Medical Diagnosis from Referral: M47.816 (ICD-10-CM) - Lumbar spondylosis  Additional Dx by Nika Barreto MD: M25.512 (ICD-10-CM) - Left shoulder pain, unspecified chronicity      Evaluation Date: 11/28/2022  Authorization Period Expiration: 12/31/2022  Plan of Care Expiration: 2/28/2023  Progress Note Due: 12/28/2022  Visit # / Visits authorized: 7/ 21   FOTO: 1/3 on 11/28/22     Precautions: Standard    PTA Visit #: 0/5     Time In: 12:15pm  Time Out: 1:00pm  Total Billable Time: 45 minutes 1:1    SUBJECTIVE     Pt reports: being OOT for several days but made a daily attempt at stretching and doing exercises with resistance band, as demonstrated during last visit, to maintain mobility while reducing pain. Pt says that she has had a vice-like pressure around the upper arm, shoulder with continued "who sleeve" pain down towards the wrist. Denied pain, numbness, tingling in the hand.      She was compliant with home exercise program.  Response to previous treatment: fair  Functional change: none    Pain: 3/10 (neck 3/10)  Location: right low back and hip (glute), L neck and UE    OBJECTIVE     Objective Measures updated at progress report unless specified.     Updated 12/27/22      Treatment     Estela received the treatments listed below:      **Exercises in BOLD performed today**     therapeutic exercises to develop strength, endurance, ROM, flexibility, posture, and core stabilization for 25 minutes " "including:    chin tuck with claude ER x 20 x GTB - change to sitting against 1/2 foam  chin tuck with claude Abd x 20 x GTB - change to sitting against 1/2 foam  open books x 10 CLAUDE - progressed to standing today  seated thoracic extensions x 10    +IR walkouts 1 x15 x RTB  +ER walkouts 1 x15 x RTB    Scalene stretch with sheet x 10 breaths    neuromuscular re-education activities to improve: Balance, Coordination, Kinesthetic, Sense, Proprioception, and Posture for 12 minutes. The following activities were included:  prone Ts: 15 x 5" - changed to standing today with RTB  prone Ys: 15 x 5" - changed to standing today with YTB  +wall walks 1 x 10 x YTB  +wall clocks 1 x 8 x YTB    therapeutic activities to improve functional performance for 8 minutes, including:  standing rows x 20 x BTB  standing SALPD x 20 x GTB    manual therapy techniques: Joint mobilizations, Manual traction, Myofacial release, Soft tissue Mobilization, and Friction Massage were applied to the: neck, back for 00 minutes, includin min x vacuum/cupping STM with manual therapy techniques was performed to L Upper trap/levator scap to decrease muscle tightness, increase circulation and promote healing process. The pt's skin was monitored for redness adjusting pressure as needed. The pt was instructed in possible side effects of bruising and/or soreness. Pt verbalized understanding. -- performed simultaneous with seated TE today  00 min x Dry needling - see last note done on 22    Modalities: MHP to CSP simultaneous with supine therex x 00 min at end of session    Patient Education and Home Exercises     Home Exercises Provided and Patient Education Provided     Education provided:   - updated HEP 2022  - added scalene stretch to HEP, 22    Written Home Exercises Provided: Patient instructed to cont prior HEP. Exercises were reviewed and Estela was able to demonstrate them prior to the end of the session.  Estela demonstrated good  " understanding of the education provided. See EMR under Patient Instructions for exercises provided during therapy sessions    ASSESSMENT     Progressed scapular and trunk strength, endurance, motor control/coordination today. Good tolerance with appropriate training effect noted, fatigue in scapula but without increased LUE sx.    Estela Is progressing well towards her goals.   Pt prognosis is Good.     Pt will continue to benefit from skilled outpatient physical therapy to address the deficits listed in the problem list box on initial evaluation, provide pt/family education and to maximize pt's level of independence in the home and community environment.     Pt's spiritual, cultural and educational needs considered and pt agreeable to plan of care and goals.     Anticipated barriers to physical therapy: standard, chronicity of sx, multiple medical diagnoses, Hx of L ankle surgery, L shoulder surgery     Goals: updated 1/4/2023   Short Term Goals (6 Weeks):   1. Pt will report 20% reduction in pain of the lumbar spine and BLE for ease with ADL's. Progressing, not met 01/04/2023   2. PT will demonstrate improved trunk strength by a half grade in for ease with upright posture during standing activities. Progressing, not met 01/04/2023    3. Pt will demonstrate improved lumbar spine ROM in all directions by a half grade for ease with bending activities. Progressing, not met 01/04/2023    4. Pt to demonstrate improved functional ability with FOTO limitation <=30% disability. Progressing, not met 01/04/2023   5. Pt to demonstrate improved  cervical ROM by 10% to allow pt to perform self care activities with increased ease. Progressing, not met 01/04/2023   6. Pt to demonstrate improved postural strength by a half grade to allow improved ADLs with increased ease. Progressing, not met 01/04/2023      Long Term Goals (12 Weeks):   1. Pt will report being independent with HEP for maintenance of improvements gained during  therapy sessions. Progressing, not met 01/04/2023   2. PT will report 50% reduction of pain of the back and BLE for ease with dressing and grooming activities. Progressing, not met 01/04/2023   3. Pt will demonstrate trunk and extremity strength to >=4+/5 without the provocation of pain for ease with household chores. Progressing, not met 01/04/2023   4. Pt will demonstrate appropriate upright posture without external cueing for ease with work related activities. Progressing, not met 01/04/2023   5. Pt to demonstrate improved functional ability with FOTO limitation <=20% disability. Progressing, not met 01/04/2023   6. Pt to demonstrate improved cervical ROM to WNL, R=L, to allow pt to perform self care with increased ease. Progressing, not met 01/04/2023   7. Pt will demonstrate >=4+/5 strength within the L scapula and shoulder for ease with house hold chores. Progressing, not met 01/04/2023     PLAN     Continue with POC for neural mobility, flexibility, trunk strength/stabilization.    Haydee Mckeon, PT

## 2023-01-08 ENCOUNTER — PATIENT MESSAGE (OUTPATIENT)
Dept: REHABILITATION | Facility: OTHER | Age: 58
End: 2023-01-08
Payer: COMMERCIAL

## 2023-08-10 ENCOUNTER — PATIENT MESSAGE (OUTPATIENT)
Dept: SPORTS MEDICINE | Facility: CLINIC | Age: 58
End: 2023-08-10
Payer: COMMERCIAL

## 2023-08-21 ENCOUNTER — PATIENT MESSAGE (OUTPATIENT)
Dept: SPORTS MEDICINE | Facility: CLINIC | Age: 58
End: 2023-08-21
Payer: COMMERCIAL

## 2023-08-21 ENCOUNTER — HOSPITAL ENCOUNTER (OUTPATIENT)
Dept: RADIOLOGY | Facility: HOSPITAL | Age: 58
Discharge: HOME | End: 2023-08-21
Attending: ORTHOPAEDIC SURGERY
Payer: COMMERCIAL

## 2023-08-21 ENCOUNTER — OFFICE VISIT (OUTPATIENT)
Dept: SPORTS MEDICINE | Facility: CLINIC | Age: 58
End: 2023-08-21
Payer: COMMERCIAL

## 2023-08-21 VITALS
HEART RATE: 70 BPM | DIASTOLIC BLOOD PRESSURE: 67 MMHG | SYSTOLIC BLOOD PRESSURE: 107 MMHG | BODY MASS INDEX: 30.83 KG/M2 | WEIGHT: 196.88 LBS

## 2023-08-21 DIAGNOSIS — M25.521 RIGHT ELBOW PAIN: ICD-10-CM

## 2023-08-21 DIAGNOSIS — M25.521 RIGHT ELBOW PAIN: Primary | ICD-10-CM

## 2023-08-21 DIAGNOSIS — M70.21 OLECRANON BURSITIS OF RIGHT ELBOW: ICD-10-CM

## 2023-08-21 PROCEDURE — 99214 PR OFFICE/OUTPT VISIT, EST, LEVL IV, 30-39 MIN: ICD-10-PCS | Mod: S$GLB,,, | Performed by: ORTHOPAEDIC SURGERY

## 2023-08-21 PROCEDURE — 3078F PR MOST RECENT DIASTOLIC BLOOD PRESSURE < 80 MM HG: ICD-10-PCS | Mod: CPTII,S$GLB,, | Performed by: ORTHOPAEDIC SURGERY

## 2023-08-21 PROCEDURE — 99999 PR PBB SHADOW E&M-EST. PATIENT-LVL III: ICD-10-PCS | Mod: PBBFAC,,, | Performed by: ORTHOPAEDIC SURGERY

## 2023-08-21 PROCEDURE — 73080 X-RAY EXAM OF ELBOW: CPT | Mod: 26,RT,, | Performed by: RADIOLOGY

## 2023-08-21 PROCEDURE — 1159F PR MEDICATION LIST DOCUMENTED IN MEDICAL RECORD: ICD-10-PCS | Mod: CPTII,S$GLB,, | Performed by: ORTHOPAEDIC SURGERY

## 2023-08-21 PROCEDURE — 99999 PR PBB SHADOW E&M-EST. PATIENT-LVL III: CPT | Mod: PBBFAC,,, | Performed by: ORTHOPAEDIC SURGERY

## 2023-08-21 PROCEDURE — 4010F ACE/ARB THERAPY RXD/TAKEN: CPT | Mod: CPTII,S$GLB,, | Performed by: ORTHOPAEDIC SURGERY

## 2023-08-21 PROCEDURE — 99214 OFFICE O/P EST MOD 30 MIN: CPT | Mod: S$GLB,,, | Performed by: ORTHOPAEDIC SURGERY

## 2023-08-21 PROCEDURE — 3008F BODY MASS INDEX DOCD: CPT | Mod: CPTII,S$GLB,, | Performed by: ORTHOPAEDIC SURGERY

## 2023-08-21 PROCEDURE — 4010F PR ACE/ARB THEARPY RXD/TAKEN: ICD-10-PCS | Mod: CPTII,S$GLB,, | Performed by: ORTHOPAEDIC SURGERY

## 2023-08-21 PROCEDURE — 3074F SYST BP LT 130 MM HG: CPT | Mod: CPTII,S$GLB,, | Performed by: ORTHOPAEDIC SURGERY

## 2023-08-21 PROCEDURE — 3074F PR MOST RECENT SYSTOLIC BLOOD PRESSURE < 130 MM HG: ICD-10-PCS | Mod: CPTII,S$GLB,, | Performed by: ORTHOPAEDIC SURGERY

## 2023-08-21 PROCEDURE — 73080 X-RAY EXAM OF ELBOW: CPT | Mod: TC,RT

## 2023-08-21 PROCEDURE — 3078F DIAST BP <80 MM HG: CPT | Mod: CPTII,S$GLB,, | Performed by: ORTHOPAEDIC SURGERY

## 2023-08-21 PROCEDURE — 1159F MED LIST DOCD IN RCRD: CPT | Mod: CPTII,S$GLB,, | Performed by: ORTHOPAEDIC SURGERY

## 2023-08-21 PROCEDURE — 3008F PR BODY MASS INDEX (BMI) DOCUMENTED: ICD-10-PCS | Mod: CPTII,S$GLB,, | Performed by: ORTHOPAEDIC SURGERY

## 2023-08-21 PROCEDURE — 73080 XR ELBOW COMPLETE 3 VIEW RIGHT: ICD-10-PCS | Mod: 26,RT,, | Performed by: RADIOLOGY

## 2023-08-21 RX ORDER — DOXYCYCLINE HYCLATE 100 MG
100 TABLET ORAL 2 TIMES DAILY
Qty: 60 TABLET | Refills: 0 | Status: SHIPPED | OUTPATIENT
Start: 2023-08-21 | End: 2024-01-02

## 2023-08-21 NOTE — PROGRESS NOTES
CC Right elbow pain    HPI:   Estela Vázquez is a pleasant 58 y.o., RHD, patient who reports to clinic with right elbow pain.      Pain and swelling has been present for 2 months   She notes posterior swelling to her elbow, it was much more swollen, red and hot to touch   She now notes the swelling not as bad and only gets red and warm at times   She admits to days where she hasn't felt great and notes on those days the elbow seemed to be more flared up as well     Tender to touch, when her elbow hits or rests on things   She feels the elbow flares after an increase in activity with her upper extremity    affecting ADLS    She has been using, ice, rest and Meloxicam   She has also stopped doing her upper body exercising     SANE: 30    She is leaving next week to be on vacation for 2 months    Review of Systems   Constitution: Negative. Negative for chills, fever and night sweats.   HENT: Negative for congestion and headaches.    Eyes: Negative for blurred vision, left vision loss and right vision loss.   Cardiovascular: Negative for chest pain and syncope.   Respiratory: Negative for cough and shortness of breath.    Endocrine: Negative for polydipsia, polyphagia and polyuria.   Hematologic/Lymphatic: Negative for bleeding problem. Does not bruise/bleed easily.   Skin: Negative for dry skin, itching and rash.   Musculoskeletal: Negative for falls and muscle weakness.   Gastrointestinal: Negative for abdominal pain and bowel incontinence.   Genitourinary: Negative for bladder incontinence and nocturia.   Neurological: Negative for disturbances in coordination, loss of balance and seizures.   Psychiatric/Behavioral: Negative for depression. The patient does not have insomnia.    Allergic/Immunologic: Negative for hives and persistent infections.     PAST MEDICAL HISTORY:   Past Medical History:   Diagnosis Date    Arrhythmia     bigeminy & trigeminy     followed cardiologist    Pulmonary embolism     Bilateral      PAST SURGICAL HISTORY:   Past Surgical History:   Procedure Laterality Date    APPENDECTOMY       SECTION N/A     HYSTERECTOMY Bilateral     LAPAROSCOPY W/ MINI-LAPAROTOMY Right     SHOULDER ARTHROSCOPY       FAMILY HISTORY: History reviewed. No pertinent family history.  SOCIAL HISTORY:   Social History     Socioeconomic History    Marital status:    Tobacco Use    Smoking status: Never    Smokeless tobacco: Never   Substance and Sexual Activity    Alcohol use: Yes     Comment: socially       MEDICATIONS:   Current Outpatient Medications:     biotin 5 mg Tab, Take 5 mg by mouth., Disp: , Rfl:     dicyclomine (BENTYL) 10 MG capsule, Take 10 mg by mouth every 6 (six) hours as needed., Disp: , Rfl:     estradioL (ESTRACE) 0.01 % (0.1 mg/gram) vaginal cream, estradiol 0.01% (0.1 mg/gram) vaginal cream, Disp: , Rfl:     fluticasone propionate (FLONASE) 50 mcg/actuation nasal spray, fluticasone propionate 50 mcg/actuation nasal spray,suspension, Disp: , Rfl:     gabapentin (NEURONTIN) 300 MG capsule, Take 1 capsule (300 mg total) by mouth 3 (three) times daily., Disp: 60 capsule, Rfl: 1    losartan (COZAAR) 25 MG tablet, Take 25 mg by mouth every evening. , Disp: , Rfl:     meloxicam (MOBIC) 15 MG tablet, Take 1 tablet (15 mg total) by mouth once daily., Disp: 30 tablet, Rfl: 3    omega-3 fatty acids/fish oil (FISH OIL-OMEGA-3 FATTY ACIDS) 300-1,000 mg capsule, Take 2 g by mouth., Disp: , Rfl:     rosuvastatin (CRESTOR) 5 MG tablet, Take 5 mg by mouth once daily., Disp: , Rfl:     traMADoL (ULTRAM) 50 mg tablet, Take 50 mg by mouth every 8 (eight) hours as needed., Disp: , Rfl:     TURMERIC ORAL, Take 1,000 mg by mouth., Disp: , Rfl:   ALLERGIES:   Review of patient's allergies indicates:   Allergen Reactions    Morphine Itching       VITAL SIGNS: /67   Pulse 70   Wt 89.3 kg (196 lb 13.9 oz)   BMI 30.83 kg/m²        PHYSICAL EXAM:  General: Patient appears alert and oriented x 3.  Mood is  pleasant.  Observation of ears, eyes and nose reveal no gross abnormalities. HEENT: NCAT, sclera nonicteric  Lungs: Respirations are equal and unlabored.  Well nourished, in no acute distress and ambulates with a non-antalgic gait with no assistive devices.    Skin: Skin intact bilaterally. Sensation intact bilaterally. Compartments soft. No evidence of edema, infection, or induration.     Right ELBOW / WRIST EXTREMITY EXAM:    OBSERVATION / INSPECTION    Swelling  + olecranon bursitis     Deformity  none  Discoloration  Redness      Scars   none    Atrophy  none    TENDERNESS / CREPITUS (T / C):           T / C        Medial epicondyle   + / -    Med. (Ulnar) collateral ligament  - / -    Flexor pronator Musculature   - / -   Biceps tendon    - / -   Head of radius    - / -    Lateral epicondyle   - / -    Extensor Musculature   - / -   Brachioradialis    - / -   Triceps tendon   - / -   Triceps muscle   - / -   Olecranon    + / -   Olecranon bursa   + / -   Cubital fossa    - / -   Anterior jointline   - / -   Radial tunnel    -/ -             ROM: ('*' = with pain)    Right Elbow  AROM (PROM)     Extension   0 deg  (5 deg)   Flexion   145 deg (145 deg)         Pronation  90 deg  (90 deg)      Supination   80 deg  (80 deg)                 Left Elbow  AROM (PROM)     Extension   0 deg  (5 deg)   Flexion   145 deg (145 deg)         Pronation  90 deg  (90 deg)      Supination   80 deg  (80 deg)             STRENGTH: ('*' = with pain)    Elbow Flexion:   5/5  Elbow Extension:  5/5  Wrist Flexion:   5/5  Wrist Extension:  5/5  :    5/5  Intrinsics:   5/5  EPL (Ext. pollicis longus): 5/5  Pinch Mechanism:  5/5    ELBOW EXAMINATION:  See above noted areas of tenderness.   Test for Ligamentous Instability - UCL normal  Test for Ligamentous Instability - LUCL normal  PLRI       neg  Tinel's (Percussion) Test - Cubital  neg  Tennis Elbow Test    neg  Golfer's Elbow Test    neg  Radial Capitellar Grind  Test   neg  Valgus/Extension Overload Test  neg  Resisted Long Finger Extension Pain neg  Moving Valgus    neg  Forearm pain with resisted supination neg  Yeargeson' s (elbow pain)   neg  Hook test     neg         EXTREMITY NEURO-VASCULAR EXAMINATION: Sensation grossly intact to light touch all dermatomal regions. DTR 2+ Biceps, Triceps, BR and Negative Jade's sign. Grossly intact motor function at Elbow, Wrist and Hand. Distal pulses radial and ulnar 2+, brisk cap refill, symmetric.    Other Findings:      Xrays: (AP, lateral, oblique) Right elbow ordered and reviewed by me personally today: no evidence of fracture or dislocation.  Osseous structures appear intact. Further prominence of olecranon bursitis change.    ASSESSMENT:  Right elbow olecranon bursitis   Right medial epicondylitis       PLAN:  Patient placed in a compression sleeve  Doxycycline twice a day for 30 days   Patient will follow up on Monday before she leaves to go out of town for 2 months     I made the decision to obtain old records of the patient including previous notes and imaging. New imaging was ordered today of the extremity or extremities evaluated. I independently reviewed and interpreted the radiographs and/or MRIs today as well as prior imaging.

## 2023-08-23 RX ORDER — ONDANSETRON 4 MG/1
4 TABLET, FILM COATED ORAL EVERY 12 HOURS PRN
Qty: 60 TABLET | Refills: 0 | Status: SHIPPED | OUTPATIENT
Start: 2023-08-23 | End: 2023-09-22

## 2023-08-28 ENCOUNTER — OFFICE VISIT (OUTPATIENT)
Dept: SPORTS MEDICINE | Facility: CLINIC | Age: 58
End: 2023-08-28
Payer: COMMERCIAL

## 2023-08-28 VITALS — WEIGHT: 198.94 LBS | BODY MASS INDEX: 31.16 KG/M2

## 2023-08-28 DIAGNOSIS — M70.21 OLECRANON BURSITIS OF RIGHT ELBOW: Primary | ICD-10-CM

## 2023-08-28 DIAGNOSIS — M77.01 MEDIAL EPICONDYLITIS OF ELBOW, RIGHT: ICD-10-CM

## 2023-08-28 PROCEDURE — 99999 PR PBB SHADOW E&M-EST. PATIENT-LVL III: CPT | Mod: PBBFAC,,, | Performed by: ORTHOPAEDIC SURGERY

## 2023-08-28 PROCEDURE — 4010F ACE/ARB THERAPY RXD/TAKEN: CPT | Mod: CPTII,S$GLB,, | Performed by: ORTHOPAEDIC SURGERY

## 2023-08-28 PROCEDURE — 4010F PR ACE/ARB THEARPY RXD/TAKEN: ICD-10-PCS | Mod: CPTII,S$GLB,, | Performed by: ORTHOPAEDIC SURGERY

## 2023-08-28 PROCEDURE — 99214 PR OFFICE/OUTPT VISIT, EST, LEVL IV, 30-39 MIN: ICD-10-PCS | Mod: S$GLB,,, | Performed by: ORTHOPAEDIC SURGERY

## 2023-08-28 PROCEDURE — 1159F MED LIST DOCD IN RCRD: CPT | Mod: CPTII,S$GLB,, | Performed by: ORTHOPAEDIC SURGERY

## 2023-08-28 PROCEDURE — 99999 PR PBB SHADOW E&M-EST. PATIENT-LVL III: ICD-10-PCS | Mod: PBBFAC,,, | Performed by: ORTHOPAEDIC SURGERY

## 2023-08-28 PROCEDURE — 99214 OFFICE O/P EST MOD 30 MIN: CPT | Mod: S$GLB,,, | Performed by: ORTHOPAEDIC SURGERY

## 2023-08-28 PROCEDURE — 3008F PR BODY MASS INDEX (BMI) DOCUMENTED: ICD-10-PCS | Mod: CPTII,S$GLB,, | Performed by: ORTHOPAEDIC SURGERY

## 2023-08-28 PROCEDURE — 3008F BODY MASS INDEX DOCD: CPT | Mod: CPTII,S$GLB,, | Performed by: ORTHOPAEDIC SURGERY

## 2023-08-28 PROCEDURE — 1159F PR MEDICATION LIST DOCUMENTED IN MEDICAL RECORD: ICD-10-PCS | Mod: CPTII,S$GLB,, | Performed by: ORTHOPAEDIC SURGERY

## 2023-08-28 NOTE — PROGRESS NOTES
CC Right elbow pain    HPI:   Estela Vázquez is a pleasant 58 y.o., RHD, patient who reports to clinic with right elbow pain.      Patient presents today for follow up of right elbow, she notes the sleeve is helping and she noticed an improvement in the swelling but that she continues to have tenderness   She has continued taking her antibiotic     PREVIOUS HISTORY: 8/21/23  Pain and swelling has been present for 2 months   She notes posterior swelling to her elbow, it was much more swollen, red and hot to touch   She now notes the swelling not as bad and only gets red and warm at times   She admits to days where she hasn't felt great and notes on those days the elbow seemed to be more flared up as well     Tender to touch, when her elbow hits or rests on things   She feels the elbow flares after an increase in activity with her upper extremity    affecting ADLS    She has been using, ice, rest and Meloxicam   She has also stopped doing her upper body exercising     SANE: 30    She is leaving next week to be on vacation for 2 months    Review of Systems   Constitution: Negative. Negative for chills, fever and night sweats.   HENT: Negative for congestion and headaches.    Eyes: Negative for blurred vision, left vision loss and right vision loss.   Cardiovascular: Negative for chest pain and syncope.   Respiratory: Negative for cough and shortness of breath.    Endocrine: Negative for polydipsia, polyphagia and polyuria.   Hematologic/Lymphatic: Negative for bleeding problem. Does not bruise/bleed easily.   Skin: Negative for dry skin, itching and rash.   Musculoskeletal: Negative for falls and muscle weakness.   Gastrointestinal: Negative for abdominal pain and bowel incontinence.   Genitourinary: Negative for bladder incontinence and nocturia.   Neurological: Negative for disturbances in coordination, loss of balance and seizures.   Psychiatric/Behavioral: Negative for depression. The patient does not have  insomnia.    Allergic/Immunologic: Negative for hives and persistent infections.     PAST MEDICAL HISTORY:   Past Medical History:   Diagnosis Date    Arrhythmia     bigeminy & trigeminy     followed cardiologist    Pulmonary embolism     Bilateral     PAST SURGICAL HISTORY:   Past Surgical History:   Procedure Laterality Date    APPENDECTOMY       SECTION N/A     HYSTERECTOMY Bilateral     LAPAROSCOPY W/ MINI-LAPAROTOMY Right     SHOULDER ARTHROSCOPY       FAMILY HISTORY: History reviewed. No pertinent family history.  SOCIAL HISTORY:   Social History     Socioeconomic History    Marital status:    Tobacco Use    Smoking status: Never    Smokeless tobacco: Never   Substance and Sexual Activity    Alcohol use: Yes     Comment: socially       MEDICATIONS:   Current Outpatient Medications:     biotin 5 mg Tab, Take 5 mg by mouth., Disp: , Rfl:     dicyclomine (BENTYL) 10 MG capsule, Take 10 mg by mouth every 6 (six) hours as needed., Disp: , Rfl:     doxycycline (VIBRA-TABS) 100 MG tablet, Take 1 tablet (100 mg total) by mouth 2 (two) times daily., Disp: 60 tablet, Rfl: 0    estradioL (ESTRACE) 0.01 % (0.1 mg/gram) vaginal cream, estradiol 0.01% (0.1 mg/gram) vaginal cream, Disp: , Rfl:     fluticasone propionate (FLONASE) 50 mcg/actuation nasal spray, fluticasone propionate 50 mcg/actuation nasal spray,suspension, Disp: , Rfl:     gabapentin (NEURONTIN) 300 MG capsule, Take 1 capsule (300 mg total) by mouth 3 (three) times daily., Disp: 60 capsule, Rfl: 1    losartan (COZAAR) 25 MG tablet, Take 25 mg by mouth every evening. , Disp: , Rfl:     meloxicam (MOBIC) 15 MG tablet, Take 1 tablet (15 mg total) by mouth once daily., Disp: 30 tablet, Rfl: 3    omega-3 fatty acids/fish oil (FISH OIL-OMEGA-3 FATTY ACIDS) 300-1,000 mg capsule, Take 2 g by mouth., Disp: , Rfl:     ondansetron (ZOFRAN) 4 MG tablet, Take 1 tablet (4 mg total) by mouth every 12 (twelve) hours as needed for Nausea., Disp: 60 tablet, Rfl:  0    rosuvastatin (CRESTOR) 5 MG tablet, Take 5 mg by mouth once daily., Disp: , Rfl:     traMADoL (ULTRAM) 50 mg tablet, Take 50 mg by mouth every 8 (eight) hours as needed., Disp: , Rfl:     TURMERIC ORAL, Take 1,000 mg by mouth., Disp: , Rfl:   ALLERGIES:   Review of patient's allergies indicates:   Allergen Reactions    Morphine Itching       VITAL SIGNS: Wt 90.2 kg (198 lb 15.4 oz)   BMI 31.16 kg/m²        PHYSICAL EXAM:  General: Patient appears alert and oriented x 3.  Mood is pleasant.  Observation of ears, eyes and nose reveal no gross abnormalities. HEENT: NCAT, sclera nonicteric  Lungs: Respirations are equal and unlabored.  Well nourished, in no acute distress and ambulates with a non-antalgic gait with no assistive devices.    Skin: Skin intact bilaterally. Sensation intact bilaterally. Compartments soft. No evidence of edema, infection, or induration.     Right ELBOW / WRIST EXTREMITY EXAM:    OBSERVATION / INSPECTION    Swelling  + olecranon bursitis, improving    Deformity  none  Discoloration  Redness      Scars   none    Atrophy  none    TENDERNESS / CREPITUS (T / C):           T / C        Medial epicondyle   + / -    Med. (Ulnar) collateral ligament  - / -    Flexor pronator Musculature   - / -   Biceps tendon    - / -   Head of radius    - / -    Lateral epicondyle   - / -    Extensor Musculature   - / -   Brachioradialis    - / -   Triceps tendon   - / -   Triceps muscle   - / -   Olecranon    + / -   Olecranon bursa   + / -   Cubital fossa    - / -   Anterior jointline   - / -   Radial tunnel    -/ -             ROM: ('*' = with pain)    Right Elbow  AROM (PROM)     Extension   0 deg  (5 deg)   Flexion   145 deg (145 deg)         Pronation  90 deg  (90 deg)      Supination   80 deg  (80 deg)                 Left Elbow  AROM (PROM)     Extension   0 deg  (5 deg)   Flexion   145 deg (145 deg)         Pronation  90 deg  (90 deg)      Supination   80 deg  (80 deg)             STRENGTH: ('*' =  with pain)    Elbow Flexion:   5/5  Elbow Extension:  5/5  Wrist Flexion:   5/5  Wrist Extension:  5/5  :    5/5  Intrinsics:   5/5  EPL (Ext. pollicis longus): 5/5  Pinch Mechanism:  5/5    ELBOW EXAMINATION:  See above noted areas of tenderness.   Test for Ligamentous Instability - UCL normal  Test for Ligamentous Instability - LUCL normal  PLRI       neg  Tinel's (Percussion) Test - Cubital  neg  Tennis Elbow Test    neg  Golfer's Elbow Test    neg  Radial Capitellar Grind Test   neg  Valgus/Extension Overload Test  neg  Resisted Long Finger Extension Pain neg  Moving Valgus    neg  Forearm pain with resisted supination neg  Yeargeson' s (elbow pain)   neg  Hook test     neg         EXTREMITY NEURO-VASCULAR EXAMINATION: Sensation grossly intact to light touch all dermatomal regions. DTR 2+ Biceps, Triceps, BR and Negative Jade's sign. Grossly intact motor function at Elbow, Wrist and Hand. Distal pulses radial and ulnar 2+, brisk cap refill, symmetric.    Other Findings:      Xrays: (AP, lateral, oblique) Right elbow ordered and reviewed by me personally today: no evidence of fracture or dislocation.  Osseous structures appear intact. Further prominence of olecranon bursitis change.    ASSESSMENT:  Right elbow olecranon bursitis, improving   Right medial epicondylitis       PLAN:  Patient to continue compression sleeve  Doxycycline twice a day for 30 days   Ok to discontinue meloxicam   Patient to begin medial epicondylitis home exercise program when olecranon bursitis has improved    I made the decision to obtain old records of the patient including previous notes and imaging. New imaging was ordered today of the extremity or extremities evaluated. I independently reviewed and interpreted the radiographs and/or MRIs today as well as prior imaging.

## 2024-01-02 ENCOUNTER — OFFICE VISIT (OUTPATIENT)
Dept: GASTROENTEROLOGY | Facility: CLINIC | Age: 59
End: 2024-01-02
Payer: COMMERCIAL

## 2024-01-02 ENCOUNTER — LAB VISIT (OUTPATIENT)
Dept: LAB | Facility: HOSPITAL | Age: 59
End: 2024-01-02
Attending: INTERNAL MEDICINE
Payer: COMMERCIAL

## 2024-01-02 VITALS
WEIGHT: 194 LBS | BODY MASS INDEX: 30.45 KG/M2 | DIASTOLIC BLOOD PRESSURE: 88 MMHG | SYSTOLIC BLOOD PRESSURE: 165 MMHG | HEART RATE: 85 BPM | HEIGHT: 67 IN

## 2024-01-02 DIAGNOSIS — Z98.890 H/O ABDOMINAL SURGERY: ICD-10-CM

## 2024-01-02 DIAGNOSIS — R10.9 CHRONIC ABDOMINAL PAIN: Primary | ICD-10-CM

## 2024-01-02 DIAGNOSIS — R93.5 ABNORMAL CT OF THE ABDOMEN: ICD-10-CM

## 2024-01-02 DIAGNOSIS — Z87.42 HISTORY OF ENDOMETRIOSIS: ICD-10-CM

## 2024-01-02 DIAGNOSIS — R19.8 IRREGULAR BOWEL HABITS: ICD-10-CM

## 2024-01-02 DIAGNOSIS — G89.29 CHRONIC ABDOMINAL PAIN: Primary | ICD-10-CM

## 2024-01-02 DIAGNOSIS — G89.29 CHRONIC ABDOMINAL PAIN: ICD-10-CM

## 2024-01-02 DIAGNOSIS — R12 HEARTBURN: ICD-10-CM

## 2024-01-02 DIAGNOSIS — K57.30 DIVERTICULOSIS OF COLON: ICD-10-CM

## 2024-01-02 DIAGNOSIS — R10.9 CHRONIC ABDOMINAL PAIN: ICD-10-CM

## 2024-01-02 DIAGNOSIS — Z87.19 HISTORY OF COLONIC DIVERTICULITIS: ICD-10-CM

## 2024-01-02 LAB
CRP SERPL-MCNC: 1.3 MG/L (ref 0–8.2)
ERYTHROCYTE [SEDIMENTATION RATE] IN BLOOD BY PHOTOMETRIC METHOD: <2 MM/HR (ref 0–36)

## 2024-01-02 PROCEDURE — 3008F BODY MASS INDEX DOCD: CPT | Mod: CPTII,S$GLB,, | Performed by: INTERNAL MEDICINE

## 2024-01-02 PROCEDURE — 86364 TISS TRNSGLTMNASE EA IG CLAS: CPT | Mod: 59 | Performed by: INTERNAL MEDICINE

## 2024-01-02 PROCEDURE — 1159F MED LIST DOCD IN RCRD: CPT | Mod: CPTII,S$GLB,, | Performed by: INTERNAL MEDICINE

## 2024-01-02 PROCEDURE — 99999 PR PBB SHADOW E&M-EST. PATIENT-LVL IV: CPT | Mod: PBBFAC,,, | Performed by: INTERNAL MEDICINE

## 2024-01-02 PROCEDURE — 85652 RBC SED RATE AUTOMATED: CPT | Performed by: INTERNAL MEDICINE

## 2024-01-02 PROCEDURE — 86036 ANCA SCREEN EACH ANTIBODY: CPT | Mod: 59 | Performed by: INTERNAL MEDICINE

## 2024-01-02 PROCEDURE — 3079F DIAST BP 80-89 MM HG: CPT | Mod: CPTII,S$GLB,, | Performed by: INTERNAL MEDICINE

## 2024-01-02 PROCEDURE — 1160F RVW MEDS BY RX/DR IN RCRD: CPT | Mod: CPTII,S$GLB,, | Performed by: INTERNAL MEDICINE

## 2024-01-02 PROCEDURE — 99204 OFFICE O/P NEW MOD 45 MIN: CPT | Mod: S$GLB,,, | Performed by: INTERNAL MEDICINE

## 2024-01-02 PROCEDURE — 3077F SYST BP >= 140 MM HG: CPT | Mod: CPTII,S$GLB,, | Performed by: INTERNAL MEDICINE

## 2024-01-02 PROCEDURE — 86038 ANTINUCLEAR ANTIBODIES: CPT | Performed by: INTERNAL MEDICINE

## 2024-01-02 PROCEDURE — 86140 C-REACTIVE PROTEIN: CPT | Performed by: INTERNAL MEDICINE

## 2024-01-02 NOTE — PROGRESS NOTES
[facility] Consultation Note    Reason for Consult:    Chief Complaint   Patient presents with    Abdominal Pain       PCP:   Marie Steinberg    Referring MD:  Self, Aaareferral  No address on file      HPI:  Estela Vázquez is a 58 y.o. female here for evaluation of abdominal pain.  She is new to my clinic.  She moved here about 2 and half years ago.  Outside records are available through Care everywhere which I personally reviewed.  Her PCP is in the [facility] system and has arranged for an upcoming CT scan.  She has a complex history with multiple abdominal surgeries that include , hysterectomy with oophorectomy, appendectomy, and lysis of adhesions.  She also has a history of diverticulitis.  She has chronic right lower quadrant abdominal pain that is severe at times and interfering with activities of daily living.  It feels like a gripping and tightness sensation that builds over time.  This has been radiating to the right upper quadrant and epigastric area.  She initially noticed problems after a  when she was found to have endometriosis.  Surgery for endometriosis improved symptoms.  She had worsening symptoms after an episode of appendicitis, and has had ongoing problems since then.  She particularly noticed worsening symptoms over the last several months.  She has almost daily symptoms.  She sometimes is awoken from her sleep.  She may have symptoms several times per day.  These are not precipitated by any specific activities, and she is unable to identify a pattern.  The symptoms occur randomly.  The pain is also unrelated to meals or bowel movements.  It starts in the same place every time in the right lower quadrant and has radiated to the right upper quadrant.  Her bowel movements are not regular, alternating between less frequent and more frequent bowel movements.  She denies blood in the stool, but has seen red blood in the past when having hard bowel  movements.  She takes Metamucil and a probiotic regularly.  She has been using tramadol for pain relief.  She has been prescribed Mobic in the past, but does not use this regularly.  Last time she used this was about 2 weeks ago.    She also has heartburn and reflux that is mostly food related.  She denies difficulties eating or swallowing.  She uses an over-the-counter H2 blocker that helps.    She reports having had a colonoscopy about 2 years ago by Dr. Ute Forde with [facility].  She was told diverticulosis was seen, but there were no other significant findings.  She has never had colon polyps.  Her last colonoscopy was performed to follow-up an episode of diverticulitis.        ROS:  Constitutional: No fevers, chills, No weight loss, normal appetite  Pulm: No cough, No shortness of breath  Ophtho: No vision changes or pain  GI: see HPI  Derm: No rash or lesions  : No dysuria, No frequent urination        Medical History:  has a past medical history of Arrhythmia and Pulmonary embolism.    Surgical History:  has a past surgical history that includes Shoulder arthroscopy; Hysterectomy (Bilateral); Laparoscopy w/ mini-laparotomy (Right);  section (N/A); and Appendectomy.    Family History: family history includes Esophageal cancer in her mother.    Social History:  reports that she has never smoked. She has never used smokeless tobacco. She reports current alcohol use. She reports that she does not use drugs.    Review of patient's allergies indicates:   Allergen Reactions    Morphine Itching       Prior to Admission medications    Medication Sig Start Date End Date Taking? Authorizing Provider   biotin 5 mg Tab Take 5 mg by mouth.    Provider, Historical   dicyclomine (BENTYL) 10 MG capsule Take 10 mg by mouth every 6 (six) hours as needed. 21   Provider, Historical   estradioL (ESTRACE) 0.01 % (0.1 mg/gram) vaginal cream estradiol 0.01% (0.1 mg/gram) vaginal cream    Provider,  "Historical   fluticasone propionate (FLONASE) 50 mcg/actuation nasal spray fluticasone propionate 50 mcg/actuation nasal spray,suspension    Provider, Historical   losartan (COZAAR) 25 MG tablet Take 25 mg by mouth every evening.     Provider, Historical   meloxicam (MOBIC) 15 MG tablet Take 1 tablet (15 mg total) by mouth once daily. 3/3/21   Nika Barreto MD   omega-3 fatty acids/fish oil (FISH OIL-OMEGA-3 FATTY ACIDS) 300-1,000 mg capsule Take 2 g by mouth.    Provider, Historical   rosuvastatin (CRESTOR) 5 MG tablet Take 5 mg by mouth once daily.    Provider, Historical   traMADoL (ULTRAM) 50 mg tablet Take 50 mg by mouth every 8 (eight) hours as needed. 10/4/22   Provider, Historical   TURMERIC ORAL Take 1,000 mg by mouth.    Provider, Historical   doxycycline (VIBRA-TABS) 100 MG tablet Take 1 tablet (100 mg total) by mouth 2 (two) times daily. 8/21/23 1/2/24  Ector Braxton DO   gabapentin (NEURONTIN) 300 MG capsule Take 1 capsule (300 mg total) by mouth 3 (three) times daily. 11/1/22 1/2/24  Semaj Cleveland MD       Objective Findings:  Vital Signs:  BP (!) 165/88   Pulse 85   Ht 5' 7" (1.702 m)   Wt 88 kg (194 lb)   BMI 30.38 kg/m²   Body mass index is 30.38 kg/m².      Physical Exam:  General Appearance:  Well appearing in no acute distress, appears stated age  Head:  Normocephalic, atraumatic  Eyes:  No scleral icterus or pallor, EOMI  Abdomen:  Soft, non distended, tenderness to deep palpation in the right lower quadrant with improvement during Carnett maneuver. No hepatosplenomegaly, ascites, or mass.  Skin:  No rash  Neurologic:  AAO x 4; CN II-XII intact      Labs:    Lab Results   Component Value Date     09/27/2022    K 4.4 09/27/2022    CO2 29 09/27/2022    BUN 21.0 09/27/2022    CREATININE 0.87 09/27/2022    CALCIUM 9.9 09/27/2022    ALBUMIN 4.4 09/27/2022    BILITOT 0.7 09/27/2022    AST 16 09/27/2022    ALT 17 09/27/2022     Outside labs reviewed in Care everywhere revealing normal CMP and " CBC done 12/22/2023.              Imaging:  CT of the abdomen and pelvis with IV contrast 05/18/2021:   CT ABDOMEN PELVIS W CONTRAST : 5/18/2021 5:58 PM  Clinical History: Abdominal pain, acute, nonlocalize  Comparison:  CT abdomen and pelvis 10/24/201  Technique:  Spiral CT imaging is performed through the abdomen and pelvis after the administration of IV contrast material. A single spiral acquisition was obtained at portal venous phase per the [facility] imaging protocol. Automatic exposure controls were utilized to limit patient exposure for the generation of the CT study  Findings:  The images of the lung bases show no acute infiltrate or significant pleural disease.. The heart is mildly enlarged. There is a small hiatal hernia  The liver, gallbladder, kidneys, adrenal glands, spleen and pancreas are unremarkable  Evaluation of the GI tract is limited without oral contrast. Diverticulosis is noted. There is no evidence of bowel obstruction or perforation  The abdominal aorta is not aneurysmal. There is no pathologic retroperitoneal adenopathy  No abdominal ascites is identified  At the pelvis, the uterus and appendix are surgically absent. There is no evidence of ascites, or adenopathy  No acute bony pathology is identified  IMPRESSION  1. POSTOPERATIVE CHANGES.  2. DIVERTICULOSIS.  3. NO ACUTE ABDOMINAL OR PELVIC PATHOLOGY IDENTIFIED.                CT scan of the abdomen and pelvis with IV contrast only 10/24/2019:   Findings:    Mild atelectatic changes to the lung bases.. The heart size is within normal limits for size.  The liver is of normal size and homogeneous density. The gallbladder is of normal caliber and no stones or wall thickening are seen. The pancreas and biliary tree show nothing unusual. The spleen is unremarkable and enhances normally.    No adrenal masses or enlargement are seen.    The kidneys uptake and excrete contrast promptly bilaterally, and the collecting systems are nondilated.   No perinephric fluid is seen.    The urinary bladder is filling normally with contrast.    Likely suture at the rectum. There is diverticulosis without evidence of diverticulitis. Appendix is surgically absent. Small hiatal hernia. The stomach is unremarkable for the degree of distention. There is no small bowel obstruction.   Hysterectomy.   No free air or free fluid is seen.   There Is Mild Fat Stranding Surrounding The SMA (Series 2 Image 36 And Series 601 Image 32). It Is Causing Mild Narrowing To The SMA Proximally. This Finding Is New From Prior Study And Is Nonspecific And Be Seen In Etiologies Including Mesenteric Panniculitis Or Other Infectious Inflammatory Etiologies. Recommend Following Until Resolution To Exclude Other Etiologies. The Bony Structures Are Unremarkable In Appearance For The Patient'S Age.                       Assessment:  Estela Vázquez is a 58 y.o. female with:  1. Chronic abdominal pain    2. History of endometriosis    3. H/O abdominal surgery    4. Diverticulosis of colon    5. History of colonic diverticulitis    6. Irregular bowel habits    7. Heartburn    8. Abnormal CT of the abdomen      She has a complex medical history with multiple abdominal surgeries as well as recurrent diverticulitis of the sigmoid colon and endometriosis status post hysterectomy and oophorectomy.  Her complaint of chronic right lower quadrant abdominal pain has been worsening over the past several months, and now with radiation to the right upper quadrant.  This is not related to meals or bowel movements, and thus is not meeting full criteria for irritable bowel syndrome.  Carnett's maneuver suggest that the pain is more visceral in nature and not from the abdominal wall.  She has had longstanding irregular bowel pattern.  She reports having had a colonoscopy around 2 years ago that was unremarkable, other than diverticulosis.  She has a CT scan already scheduled through her PCP at [facility] in the next  couple weeks.      Recommendations/Plan:  1. Labs to include blood work and stool test as noted below  2. She will notify me when CT scans are available for my review.  I advised her to ensure that both IV and oral contrast her used during that CT scan.  3. She may try peppermint oil capsules such as IBgard.  4. She may increase Bentyl to 20 mg as needed      Follow-up pending results of above.  Upper endoscopy may be necessary based on these results.      Order summary:  Orders Placed This Encounter    C-reactive protein    ESR (SEDIMENTATION RATE, MANUAL)    Celiac Disease Panel    LEANDER    ANTI-NEUTROPHILIC CYTOPLASMIC ANTIBODY    Calprotectin, Stool         Thank you so much for allowing me to participate in the care of Estela Patelkike Bennett MD      Medical decision making:   Visit time 55 minutes with more than 50% of time spent reviewing medical records and counseling on further evaluation and treatment.

## 2024-01-02 NOTE — PROGRESS NOTES
"GENERAL GI PATIENT INTAKE:    COVID symptoms in the last 7 days (runny nose, sore throat, congestion, cough, fever): No  PCP: No, Primary Doctor  If not PCP-  number given to establish [phone]: N/A    ALLERGIES REVIEWED:  Yes    CHIEF COMPLAINT:    Chief Complaint   Patient presents with    Abdominal Pain       VITAL SIGNS:  BP (!) 165/88   Pulse 85   Ht 5' 7" (1.702 m)   Wt 88 kg (194 lb)   BMI 30.38 kg/m²      Change in medical, surgical, family or social history: No      REVIEWED MEDICATION LIST RECONCILED INCLUDING ABOVE MEDS:  Yes     "

## 2024-01-03 ENCOUNTER — PATIENT MESSAGE (OUTPATIENT)
Dept: GASTROENTEROLOGY | Facility: CLINIC | Age: 59
End: 2024-01-03
Payer: COMMERCIAL

## 2024-01-03 LAB — ANA SER QL IF: NORMAL

## 2024-01-05 LAB
ANCA AB TITR SER IF: NORMAL TITER
GLIADIN PEPTIDE IGA SER-ACNC: 0.4 U/ML
GLIADIN PEPTIDE IGG SER-ACNC: <0.6 U/ML
IGA SERPL-MCNC: 127 MG/DL (ref 70–400)
P-ANCA TITR SER IF: NORMAL TITER
TTG IGA SER-ACNC: <0.2 U/ML
TTG IGG SER-ACNC: <0.6 U/ML

## 2024-01-24 ENCOUNTER — LAB VISIT (OUTPATIENT)
Dept: LAB | Facility: HOSPITAL | Age: 59
End: 2024-01-24
Attending: INTERNAL MEDICINE
Payer: COMMERCIAL

## 2024-01-24 DIAGNOSIS — R19.8 IRREGULAR BOWEL HABITS: ICD-10-CM

## 2024-01-24 DIAGNOSIS — R10.9 CHRONIC ABDOMINAL PAIN: ICD-10-CM

## 2024-01-24 DIAGNOSIS — G89.29 CHRONIC ABDOMINAL PAIN: ICD-10-CM

## 2024-01-24 DIAGNOSIS — Z87.42 HISTORY OF ENDOMETRIOSIS: ICD-10-CM

## 2024-01-24 DIAGNOSIS — R93.5 ABNORMAL CT OF THE ABDOMEN: ICD-10-CM

## 2024-01-24 PROCEDURE — 83993 ASSAY FOR CALPROTECTIN FECAL: CPT | Performed by: INTERNAL MEDICINE

## 2024-01-26 ENCOUNTER — PATIENT MESSAGE (OUTPATIENT)
Dept: GASTROENTEROLOGY | Facility: CLINIC | Age: 59
End: 2024-01-26
Payer: COMMERCIAL

## 2024-01-30 LAB — CALPROTECTIN STL-MCNT: <27.1 MCG/G

## 2024-02-09 ENCOUNTER — TELEPHONE (OUTPATIENT)
Dept: ENDOSCOPY | Facility: HOSPITAL | Age: 59
End: 2024-02-09
Payer: COMMERCIAL

## 2024-02-09 DIAGNOSIS — R10.9 ABDOMINAL PAIN, UNSPECIFIED ABDOMINAL LOCATION: Primary | ICD-10-CM

## 2024-02-09 DIAGNOSIS — R93.3 ABNORMAL CT SCAN, COLON: ICD-10-CM

## 2024-02-09 DIAGNOSIS — Z12.11 ENCOUNTER FOR SCREENING COLONOSCOPY FOR NON-HIGH-RISK PATIENT: ICD-10-CM

## 2024-02-09 RX ORDER — SODIUM, POTASSIUM,MAG SULFATES 17.5-3.13G
1 SOLUTION, RECONSTITUTED, ORAL ORAL DAILY
Qty: 1 KIT | Refills: 0 | Status: SHIPPED | OUTPATIENT
Start: 2024-02-09 | End: 2024-02-11

## 2024-02-09 NOTE — TELEPHONE ENCOUNTER
"----- Message from Leni Bacon sent at 2024  2:12 PM CST -----  Regarding: FW: EGD/Colonoscopy    ----- Message -----  From: Chucho Bennett MD  Sent: 2024  10:00 AM CST  To: [facility]  Subject: EGD/Colonoscopy                                  Procedure: EGD/Colonoscopy    Diagnosis: Abdominal pain, abnormal CT of the colon    Procedure Timin-12 weeks    #If within 4 weeks selected, please laisha as high priority#    #If greater than 12 weeks, please select "5-12 weeks" and delay sending until 3 months prior to requested date#     Provider: Myself    Location: No Preference    Additional Scheduling Information: No scheduling concerns    Prep Specifications:Standard prep    Is the patient taking a GLP-1 Agonist:no    Have you attached a patient to this message: yes        "

## 2024-02-09 NOTE — TELEPHONE ENCOUNTER
"From: Chucho Bennett MD  Sent: 2024  10:00 AM CST  To: [facility]  Subject: EGD/Colonoscopy                                   Procedure: EGD/Colonoscopy     Diagnosis: Abdominal pain, abnormal CT of the colon     Procedure Timin-12 weeks     #If within 4 weeks selected, please laisha as high priority#     #If greater than 12 weeks, please select "5-12 weeks" and delay sending until 3 months prior to requested date#      Provider: Myself     Location: No Preference     Additional Scheduling Information: No scheduling concerns     Prep Specifications:Standard prep     Is the patient taking a GLP-1 Agonist:no     Have you attached a patient to this message: yes           "

## 2024-02-09 NOTE — TELEPHONE ENCOUNTER
Spoke to pt to schedule procedure(s) Colonoscopy/EGD       Physician to perform procedure(s) Dr. ROBER Bennett  Date of Procedure (s) 4/25/24  Arrival Time 12:15 PM  Time of Procedure(s) 1:15 PM   Location of Procedure(s) [place] [address] Floor  Type of Rx Prep sent to patient: Suprep  Instructions provided to patient via [facility]    Patient was informed on the following information and verbalized understanding. Screening questionnaire reviewed with patient and complete. If procedure requires anesthesia, a responsible adult needs to be present to accompany the patient home, patient cannot drive after receiving anesthesia. Appointment details are tentative, especially check-in time. Patient will receive a prep-op call 7 days prior to confirm check-in time for procedure. If applicable the patient should contact their pharmacy to verify Rx for procedure prep is ready for pick-up. Patient was advised to call the scheduling department at [phone] if pharmacy states no Rx is available. Patient was advised to call the endoscopy scheduling department if any questions or concerns arise.      [facility] Scheduling Department

## 2024-02-09 NOTE — TELEPHONE ENCOUNTER
Contacted the patient to schedule an endoscopy procedure(s) 02/09/24. The patient did not answer the call and left a voice message requesting a call back.

## 2024-02-12 ENCOUNTER — PATIENT MESSAGE (OUTPATIENT)
Dept: SPORTS MEDICINE | Facility: CLINIC | Age: 59
End: 2024-02-12
Payer: COMMERCIAL

## 2024-02-13 ENCOUNTER — HOSPITAL ENCOUNTER (EMERGENCY)
Facility: HOSPITAL | Age: 59
Discharge: HOME | End: 2024-02-13
Attending: EMERGENCY MEDICINE
Payer: COMMERCIAL

## 2024-02-13 VITALS
WEIGHT: 194 LBS | HEIGHT: 67 IN | DIASTOLIC BLOOD PRESSURE: 71 MMHG | HEART RATE: 55 BPM | RESPIRATION RATE: 18 BRPM | BODY MASS INDEX: 30.45 KG/M2 | TEMPERATURE: 99 F | SYSTOLIC BLOOD PRESSURE: 118 MMHG | OXYGEN SATURATION: 94 %

## 2024-02-13 DIAGNOSIS — K57.92 DIVERTICULITIS: Primary | ICD-10-CM

## 2024-02-13 LAB
ALBUMIN SERPL BCP-MCNC: 4.2 G/DL (ref 3.5–5.2)
ALP SERPL-CCNC: 70 U/L (ref 55–135)
ALT SERPL W/O P-5'-P-CCNC: 22 U/L (ref 10–44)
AMORPH CRY UR QL COMP ASSIST: ABNORMAL
ANION GAP SERPL CALC-SCNC: 10 MMOL/L (ref 8–16)
AST SERPL-CCNC: 20 U/L (ref 10–40)
BASOPHILS # BLD AUTO: 0.05 K/UL (ref 0–0.2)
BASOPHILS NFR BLD: 0.6 % (ref 0–1.9)
BILIRUB SERPL-MCNC: 0.4 MG/DL (ref 0.1–1)
BILIRUB UR QL STRIP: NEGATIVE
BUN SERPL-MCNC: 9 MG/DL (ref 6–20)
BUN SERPL-MCNC: 9 MG/DL (ref 6–30)
CALCIUM SERPL-MCNC: 10.5 MG/DL (ref 8.7–10.5)
CHLORIDE SERPL-SCNC: 107 MMOL/L (ref 95–110)
CHLORIDE SERPL-SCNC: 107 MMOL/L (ref 95–110)
CLARITY UR REFRACT.AUTO: ABNORMAL
CO2 SERPL-SCNC: 23 MMOL/L (ref 23–29)
COLOR UR AUTO: ABNORMAL
CREAT SERPL-MCNC: 0.6 MG/DL (ref 0.5–1.4)
CREAT SERPL-MCNC: 0.8 MG/DL (ref 0.5–1.4)
DIFFERENTIAL METHOD BLD: NORMAL
EOSINOPHIL # BLD AUTO: 0.1 K/UL (ref 0–0.5)
EOSINOPHIL NFR BLD: 1.5 % (ref 0–8)
ERYTHROCYTE [DISTWIDTH] IN BLOOD BY AUTOMATED COUNT: 13.6 % (ref 11.5–14.5)
EST. GFR  (NO RACE VARIABLE): >60 ML/MIN/1.73 M^2
GLUCOSE SERPL-MCNC: 89 MG/DL (ref 70–110)
GLUCOSE SERPL-MCNC: 94 MG/DL (ref 70–110)
GLUCOSE UR QL STRIP: NEGATIVE
HCT VFR BLD AUTO: 42.1 % (ref 37–48.5)
HCT VFR BLD CALC: 41 %PCV (ref 36–54)
HCV AB SERPL QL IA: NORMAL
HGB BLD-MCNC: 13.5 G/DL (ref 12–16)
HGB UR QL STRIP: NEGATIVE
HIV 1+2 AB+HIV1 P24 AG SERPL QL IA: NORMAL
IMM GRANULOCYTES # BLD AUTO: 0.03 K/UL (ref 0–0.04)
IMM GRANULOCYTES NFR BLD AUTO: 0.4 % (ref 0–0.5)
KETONES UR QL STRIP: NEGATIVE
LEUKOCYTE ESTERASE UR QL STRIP: NEGATIVE
LIPASE SERPL-CCNC: 19 U/L (ref 4–60)
LYMPHOCYTES # BLD AUTO: 2 K/UL (ref 1–4.8)
LYMPHOCYTES NFR BLD: 25 % (ref 18–48)
MCH RBC QN AUTO: 28 PG (ref 27–31)
MCHC RBC AUTO-ENTMCNC: 32.1 G/DL (ref 32–36)
MCV RBC AUTO: 87 FL (ref 82–98)
MICROSCOPIC COMMENT: ABNORMAL
MONOCYTES # BLD AUTO: 0.4 K/UL (ref 0.3–1)
MONOCYTES NFR BLD: 4.4 % (ref 4–15)
NEUTROPHILS # BLD AUTO: 5.5 K/UL (ref 1.8–7.7)
NEUTROPHILS NFR BLD: 68.1 % (ref 38–73)
NITRITE UR QL STRIP: NEGATIVE
NRBC BLD-RTO: 0 /100 WBC
PH UR STRIP: 8 [PH] (ref 5–8)
PLATELET # BLD AUTO: 281 K/UL (ref 150–450)
PMV BLD AUTO: 10.4 FL (ref 9.2–12.9)
POC IONIZED CALCIUM: 1.18 MMOL/L (ref 1.06–1.42)
POC TCO2 (MEASURED): 28 MMOL/L (ref 23–29)
POTASSIUM BLD-SCNC: 4.2 MMOL/L (ref 3.5–5.1)
POTASSIUM SERPL-SCNC: 4.3 MMOL/L (ref 3.5–5.1)
PROT SERPL-MCNC: 7.3 G/DL (ref 6–8.4)
PROT UR QL STRIP: NEGATIVE
RBC # BLD AUTO: 4.82 M/UL (ref 4–5.4)
RBC #/AREA URNS AUTO: 5 /HPF (ref 0–4)
SAMPLE: NORMAL
SODIUM BLD-SCNC: 141 MMOL/L (ref 136–145)
SODIUM SERPL-SCNC: 140 MMOL/L (ref 136–145)
SP GR UR STRIP: 1.01 (ref 1–1.03)
SQUAMOUS #/AREA URNS AUTO: 1 /HPF
URN SPEC COLLECT METH UR: ABNORMAL
WBC # BLD AUTO: 8.03 K/UL (ref 3.9–12.7)

## 2024-02-13 PROCEDURE — 25000003 PHARM REV CODE 250

## 2024-02-13 PROCEDURE — 99285 EMERGENCY DEPT VISIT HI MDM: CPT | Mod: 25

## 2024-02-13 PROCEDURE — 96376 TX/PRO/DX INJ SAME DRUG ADON: CPT

## 2024-02-13 PROCEDURE — 85025 COMPLETE CBC W/AUTO DIFF WBC: CPT

## 2024-02-13 PROCEDURE — 63600175 PHARM REV CODE 636 W HCPCS

## 2024-02-13 PROCEDURE — 80048 BASIC METABOLIC PNL TOTAL CA: CPT | Mod: XB

## 2024-02-13 PROCEDURE — 80053 COMPREHEN METABOLIC PANEL: CPT

## 2024-02-13 PROCEDURE — 87389 HIV-1 AG W/HIV-1&-2 AB AG IA: CPT | Performed by: EMERGENCY MEDICINE

## 2024-02-13 PROCEDURE — 86803 HEPATITIS C AB TEST: CPT | Performed by: EMERGENCY MEDICINE

## 2024-02-13 PROCEDURE — 83690 ASSAY OF LIPASE: CPT

## 2024-02-13 PROCEDURE — 81001 URINALYSIS AUTO W/SCOPE: CPT

## 2024-02-13 PROCEDURE — 25500020 PHARM REV CODE 255: Performed by: EMERGENCY MEDICINE

## 2024-02-13 PROCEDURE — 96374 THER/PROPH/DIAG INJ IV PUSH: CPT

## 2024-02-13 PROCEDURE — 96375 TX/PRO/DX INJ NEW DRUG ADDON: CPT

## 2024-02-13 RX ORDER — HYDROMORPHONE HYDROCHLORIDE 1 MG/ML
0.5 INJECTION, SOLUTION INTRAMUSCULAR; INTRAVENOUS; SUBCUTANEOUS
Status: COMPLETED | OUTPATIENT
Start: 2024-02-13 | End: 2024-02-13

## 2024-02-13 RX ORDER — ONDANSETRON HYDROCHLORIDE 2 MG/ML
4 INJECTION, SOLUTION INTRAVENOUS
Status: COMPLETED | OUTPATIENT
Start: 2024-02-13 | End: 2024-02-13

## 2024-02-13 RX ORDER — ONDANSETRON 4 MG/1
4 TABLET, ORALLY DISINTEGRATING ORAL 2 TIMES DAILY
Qty: 12 TABLET | Refills: 0 | Status: SHIPPED | OUTPATIENT
Start: 2024-02-13

## 2024-02-13 RX ORDER — HYDROCODONE BITARTRATE AND ACETAMINOPHEN 5; 325 MG/1; MG/1
1 TABLET ORAL EVERY 6 HOURS PRN
Qty: 12 TABLET | Refills: 0 | OUTPATIENT
Start: 2024-02-13 | End: 2024-04-18

## 2024-02-13 RX ORDER — AMOXICILLIN AND CLAVULANATE POTASSIUM 875; 125 MG/1; MG/1
1 TABLET, FILM COATED ORAL
Status: COMPLETED | OUTPATIENT
Start: 2024-02-13 | End: 2024-02-13

## 2024-02-13 RX ORDER — AMOXICILLIN AND CLAVULANATE POTASSIUM 875; 125 MG/1; MG/1
1 TABLET, FILM COATED ORAL 2 TIMES DAILY
Qty: 14 TABLET | Refills: 0 | Status: ON HOLD | OUTPATIENT
Start: 2024-02-13 | End: 2024-04-25 | Stop reason: HOSPADM

## 2024-02-13 RX ADMIN — AMOXICILLIN AND CLAVULANATE POTASSIUM 1 TABLET: 875; 125 TABLET, FILM COATED ORAL at 05:02

## 2024-02-13 RX ADMIN — HYDROMORPHONE HYDROCHLORIDE 0.5 MG: 1 INJECTION, SOLUTION INTRAMUSCULAR; INTRAVENOUS; SUBCUTANEOUS at 05:02

## 2024-02-13 RX ADMIN — ONDANSETRON 4 MG: 2 INJECTION INTRAMUSCULAR; INTRAVENOUS at 02:02

## 2024-02-13 RX ADMIN — IOHEXOL 100 ML: 350 INJECTION, SOLUTION INTRAVENOUS at 03:02

## 2024-02-13 RX ADMIN — HYDROMORPHONE HYDROCHLORIDE 0.5 MG: 1 INJECTION, SOLUTION INTRAMUSCULAR; INTRAVENOUS; SUBCUTANEOUS at 02:02

## 2024-02-13 NOTE — ED TRIAGE NOTES
59 y/o F presents to ER with c/c LLQ abd pain rated 7/10 for 3 weeks. Pt states that she came to ER because today she started vomiting. Endorses loose stool BM today and flatulence. Denies c/p, diarrhea, fevers and chills.

## 2024-02-13 NOTE — ED NOTES
I-STAT Chem-8+ Results:   Value Reference Range   Sodium 141 136-145 mmol/L   Potassium  4.2 3.5-5.1 mmol/L   Chloride 107  mmol/L   Ionized Calcium 1.18 1.06-1.42 mmol/L   CO2 (measured) 28 23-29 mmol/L   Glucose 94  mg/dL   BUN 9 6-30 mg/dL   Creatinine 0.6 0.5-1.4 mg/dL   Hematocrit 41 36-54%

## 2024-02-13 NOTE — ED PROVIDER NOTES
Encounter Date: 2024       History     Chief Complaint   Patient presents with    Abdominal Pain     Patient presents for evaluation of LLQ pain x two weeks. Endorses two episodes of emesis today. +subjective fever     58-year-old female with history of CHF, PE, HLD, and diverticulitis presents to the ED regarding LLQ pain onset 2 weeks.  States the pain is intermittently worse and became severely worse this morning so came to the ED for evaluation.  States she has been nauseated and had 2 episodes of nonbloody emesis this morning.  She also had about 5-6 loose stools.  Admits to chills but denies fever.  Describes the pain as 7/10 throbbing that radiates into the right lower abdomen as well.  Surgical history consists of appendectomy, , and hysterectomy.  No pelvic pain or vaginal discharge.  No dysuria.  No chest pain, shortness of breath, or any other complaints at this time.    The history is provided by the patient and medical records.     Review of patient's allergies indicates:   Allergen Reactions    Morphine Itching     Past Medical History:   Diagnosis Date    Arrhythmia     bigeminy & trigeminy     followed cardiologist    Pulmonary embolism     Bilateral     Past Surgical History:   Procedure Laterality Date    APPENDECTOMY       SECTION N/A     HYSTERECTOMY Bilateral     LAPAROSCOPY W/ MINI-LAPAROTOMY Right     SHOULDER ARTHROSCOPY       Family History   Problem Relation Age of Onset    Esophageal cancer Mother     Colon cancer Neg Hx      Social History     Tobacco Use    Smoking status: Never    Smokeless tobacco: Never   Substance Use Topics    Alcohol use: Yes     Comment: socially    Drug use: Never     Review of Systems   Constitutional:  Positive for chills. Negative for fever.   Gastrointestinal:  Positive for abdominal pain, diarrhea, nausea and vomiting.   Genitourinary:  Negative for dysuria and pelvic pain.       Physical Exam     Initial Vitals [24 1350]   BP  Pulse Resp Temp SpO2   (!) 144/78 77 16 97.9 °F (36.6 °C) 98 %      MAP       --         Physical Exam    Vitals reviewed.  Constitutional: She appears well-developed and well-nourished. She is not diaphoretic. No distress.   HENT:   Head: Normocephalic and atraumatic.   Cardiovascular:  Normal rate.           Pulmonary/Chest: No respiratory distress.   Abdominal: Abdomen is soft and flat. She exhibits no distension. There is generalized abdominal tenderness. There is no rebound, no guarding, no tenderness at McBurney's point and negative Matias's sign.     Neurological: She is alert and oriented to person, place, and time.   Psychiatric: She has a normal mood and affect.         ED Course   Procedures  Labs Reviewed   URINALYSIS, REFLEX TO URINE CULTURE - Abnormal; Notable for the following components:       Result Value    Appearance, UA Cloudy (*)     All other components within normal limits    Narrative:     Specimen Source->Urine   URINALYSIS MICROSCOPIC - Abnormal; Notable for the following components:    RBC, UA 5 (*)     All other components within normal limits    Narrative:     Specimen Source->Urine   HIV 1 / 2 ANTIBODY    Narrative:     Release to patient->Immediate   HEPATITIS C ANTIBODY    Narrative:     Release to patient->Immediate   CBC W/ AUTO DIFFERENTIAL    Narrative:     Release to patient->Immediate   COMPREHENSIVE METABOLIC PANEL    Narrative:     Release to patient->Immediate   LIPASE    Narrative:     Release to patient->Immediate   ISTAT PROCEDURE   ISTAT CHEM8          Imaging Results               CT Abdomen Pelvis With IV Contrast NO Oral Contrast (Final result)  Result time 02/13/24 16:37:20      Final result by Samuel Ballesteros MD (02/13/24 16:37:20)                   Impression:      1. Minimal pericolonic fat stranding adjacent to several sigmoid colon diverticula.  Finding could represent early acute uncomplicated diverticulitis.  Recommend clinical correlation.  2. Additional  findings as above.  This report was flagged in Epic as abnormal.    Electronically signed by resident: Latha Galo  Date:    02/13/2024  Time:    16:01    Electronically signed by: Samuel Ballesteros  Date:    02/13/2024  Time:    16:37               Narrative:    EXAMINATION:  CT ABDOMEN PELVIS WITH IV CONTRAST    CLINICAL HISTORY:  LLQ abdominal pain;    TECHNIQUE:  Low dose axial images, sagittal and coronal reformations were obtained from the lung bases to the pubic symphysis following the IV administration of 100 mL of Omnipaque 350.Oral contrast was not given.    COMPARISON:  Lumbar radiograph 11/01/2022    FINDINGS:  Inferior heart: Heart is mildly prominent.  No pericardial effusion.    Inferior lungs: Few thin bandlike densities within the bilateral lung bases suggestive for platelike atelectasis or pulmonary scarring.    Liver: Enlarged.  No focal lesion.    Gallbladder: Unremarkable.    Bile ducts: No ductal dilatation.    Pancreas: No mass, ductal dilatation, or peripancreatic inflammatory changes.    Spleen: Unremarkable.    Adrenals: Unremarkable.    GI Tract/ Mesentery: Small and large bowel are normal caliber.  No evidence of bowel obstruction.  Status post appendectomy.  Surgical changes noted at the anorectal junction.  Colonic diverticulosis.  Minimal pericolonic fat stranding adjacent to several sigmoid colon diverticula (series 601, image 99 and 86) which could represent early acute uncomplicated diverticulitis.    Kidneys/ Ureters: No mass.  No hydronephrosis or nephrolithiasis.  No ureteral dilatation.    Bladder: Unremarkable.    Reproductive organs: Hysterectomy.    Retroperitoneum: No pathologic adenopathy.    Peritoneal space: No ascites.  No free air.    Abdominal wall: Postsurgical changes of the anterior pelvic wall.    Vasculature: No aneurysm.  Mild calcific atherosclerosis.    Bones: Degenerative changes of the spine.  Chronic mild impression deformity of the T11 vertebral body,  similar to 11/01/2022.                                       Medications   amoxicillin-clavulanate 875-125mg per tablet 1 tablet (has no administration in time range)   HYDROmorphone injection 0.5 mg (has no administration in time range)   ondansetron injection 4 mg (4 mg Intravenous Given 2/13/24 1431)   HYDROmorphone injection 0.5 mg (0.5 mg Intravenous Given 2/13/24 1430)   iohexoL (OMNIPAQUE 350) injection 100 mL (100 mLs Intravenous Given 2/13/24 1555)     Medical Decision Making  Amount and/or Complexity of Data Reviewed  Labs: ordered. Decision-making details documented in ED Course.  Radiology: ordered. Decision-making details documented in ED Course.    Risk  Prescription drug management.         APC / Resident Notes:   Emergent evaluation 58-year-old female presenting with left lower quadrant abdominal pain onset 2 weeks.  Vitals stable.  Clinically well-appearing.  Abdomen is soft with generalized tenderness worse in LLQ. Will obtain labs and CT abdomen and pelvis. Concerned for diverticulitis. Will provide analgesia and reassess.    My differential diagnoses include but are not limited to:   Diverticulitis, colitis, viral gastroenteritis, UTI, electrolyte abnormality, NATHANIEL    See ED course.  I have reviewed the patient's records and discussed with my supervising physician.        ED Course as of 02/13/24 1712 Tue Feb 13, 2024   1539 CBC W/ AUTO DIFFERENTIAL  WNL.  No leukocytosis or anemia.  No thrombocytopenia [KB]   1539 Comp. Metabolic Panel  WNL.  No electrolyte or metabolic derangement.  No NATHANIEL.  No elevated LFTs. [KB]   1539 Lipase: 19  Unlikely pancreatitis [KB]   1539 Urinalysis, Reflex to Urine Culture Urine, Clean Catch(!)  Does not appear infectious. No complaints of dysuria [KB]   7130 CT Abdomen Pelvis With IV Contrast NO Oral Contrast(!)  Impression:     1. Minimal pericolonic fat stranding adjacent to several sigmoid colon diverticula.  Finding could represent early acute uncomplicated  diverticulitis.  Recommend clinical correlation.  2. Additional findings as above.  This report was flagged in Epic as abnormal. [KB]   2329 Patient and  at bedside educated on findings. Rx'd Augmentin, Zofran, and Norco. Educated on meds and side effects. Advised to follow up with PCP and strict ED return precautions given with all questions answered. Patient verbalized understanding and agreed to plan. Vitals are stable and safe for discharge.  [KB]      ED Course User Index  [KB] Rebekah Luevano PA-C                           Clinical Impression:  Final diagnoses:  [K57.92] Diverticulitis (Primary)          ED Disposition Condition    Discharge Stable          ED Prescriptions       Medication Sig Dispense Start Date End Date Auth. Provider    amoxicillin-clavulanate 875-125mg (AUGMENTIN) 875-125 mg per tablet Take 1 tablet by mouth 2 (two) times daily. 14 tablet 2/13/2024 -- Rebekah Luevano PA-C    ondansetron (ZOFRAN-ODT) 4 MG TbDL Take 1 tablet (4 mg total) by mouth 2 (two) times daily. 12 tablet 2/13/2024 -- Rebekah Luevano PA-C    HYDROcodone-acetaminophen (NORCO) 5-325 mg per tablet Take 1 tablet by mouth every 6 (six) hours as needed for Pain. 12 tablet 2/13/2024 -- Rebekah Luevano PA-C          Follow-up Information       Follow up With Specialties Details Why Contact Info    Marie Steinberg MD Pediatrics Schedule an appointment as soon as possible for a visit   [address] [facility] [address]  [phone]      [facility] - Emergency Dept Emergency Medicine Go to  If symptoms worsen [address] [facility] [address] [phone]             Rebekah Luevano PA-C  02/13/24 8947

## 2024-02-13 NOTE — ED NOTES
Patient identifiers for Estela Vázquez 58 y.o. female checked and correct.  Chief Complaint   Patient presents with    Abdominal Pain     Patient presents for evaluation of LLQ pain x two weeks. Endorses two episodes of emesis today. +subjective fever     Past Medical History:   Diagnosis Date    Arrhythmia     bigeminy & trigeminy     followed cardiologist    Pulmonary embolism     Bilateral     Allergies reported:   Review of patient's allergies indicates:   Allergen Reactions    Morphine Itching         LOC: Patient is awake, alert, and aware of environment with an appropriate affect. Patient is oriented x 4 and speaking appropriately.  APPEARANCE: Patient is guarding abdomen. Patient is clean and well groomed, patient's clothing is properly fastened.  HEENT: - JVD, + midline trach  SKIN: The skin is warm and dry. Patient has normal skin turgor and moist mucus membranes.   MUSKULOSKELETAL: Patient is moving all extremities well, no obvious deformities noted. Pulses intact.   RESPIRATORY: Airway is open and patent. Respirations are spontaneous and non-labored with normal effort and rate.  CARDIAC: Patient has a normal rate and rhythm. 77 on cardiac monitor. No peripheral edema noted.   ABDOMEN: No distention noted. Soft and tender to touch in LLQ, pain rated 7/10. Endorses nausea and vomiting.  NEUROLOGICAL: pupils 4 mm, PERRL. Facial expression is symmetrical. Hand grasps are equal bilaterally. Normal sensation in all extremities when touched with finger.

## 2024-02-15 ENCOUNTER — OFFICE VISIT (OUTPATIENT)
Dept: SPORTS MEDICINE | Facility: CLINIC | Age: 59
End: 2024-02-15
Payer: COMMERCIAL

## 2024-02-15 ENCOUNTER — HOSPITAL ENCOUNTER (OUTPATIENT)
Dept: RADIOLOGY | Facility: HOSPITAL | Age: 59
Discharge: HOME | End: 2024-02-15
Attending: PHYSICIAN ASSISTANT
Payer: COMMERCIAL

## 2024-02-15 VITALS
HEIGHT: 67 IN | BODY MASS INDEX: 30.76 KG/M2 | SYSTOLIC BLOOD PRESSURE: 137 MMHG | DIASTOLIC BLOOD PRESSURE: 76 MMHG | WEIGHT: 196 LBS | HEART RATE: 61 BPM

## 2024-02-15 DIAGNOSIS — M25.522 LEFT ELBOW PAIN: Primary | ICD-10-CM

## 2024-02-15 DIAGNOSIS — M25.522 LEFT ELBOW PAIN: ICD-10-CM

## 2024-02-15 PROCEDURE — 1160F RVW MEDS BY RX/DR IN RCRD: CPT | Mod: CPTII,S$GLB,, | Performed by: PHYSICIAN ASSISTANT

## 2024-02-15 PROCEDURE — 1159F MED LIST DOCD IN RCRD: CPT | Mod: CPTII,S$GLB,, | Performed by: PHYSICIAN ASSISTANT

## 2024-02-15 PROCEDURE — 73080 X-RAY EXAM OF ELBOW: CPT | Mod: TC,LT

## 2024-02-15 PROCEDURE — 99999 PR PBB SHADOW E&M-EST. PATIENT-LVL IV: CPT | Mod: PBBFAC,,, | Performed by: PHYSICIAN ASSISTANT

## 2024-02-15 PROCEDURE — 3075F SYST BP GE 130 - 139MM HG: CPT | Mod: CPTII,S$GLB,, | Performed by: PHYSICIAN ASSISTANT

## 2024-02-15 PROCEDURE — 73080 X-RAY EXAM OF ELBOW: CPT | Mod: 26,LT,, | Performed by: RADIOLOGY

## 2024-02-15 PROCEDURE — 99214 OFFICE O/P EST MOD 30 MIN: CPT | Mod: S$GLB,,, | Performed by: PHYSICIAN ASSISTANT

## 2024-02-15 PROCEDURE — 3008F BODY MASS INDEX DOCD: CPT | Mod: CPTII,S$GLB,, | Performed by: PHYSICIAN ASSISTANT

## 2024-02-15 PROCEDURE — 3078F DIAST BP <80 MM HG: CPT | Mod: CPTII,S$GLB,, | Performed by: PHYSICIAN ASSISTANT

## 2024-02-28 NOTE — PROGRESS NOTES
CC left elbow pain     57 y/o female who reports recent mild intermittent elbow pain of the left posterior elbow overlying the olecranon bursa that feels like the start of bursitis like the previously had on the right side. She denies trauma, swelling, or recent abrasions. She is here on the early side to catch symptoms early.     She  denies fever.     Is not affecting ADLS     Review of Systems   Constitution: Negative. Negative for chills, fever and night sweats.   HENT: Negative for congestion and headaches.    Eyes: Negative for blurred vision, left vision loss and right vision loss.   Cardiovascular: Negative for chest pain and syncope.   Respiratory: Negative for cough and shortness of breath.    Endocrine: Negative for polydipsia, polyphagia and polyuria.   Hematologic/Lymphatic: Negative for bleeding problem. Does not bruise/bleed easily.   Skin: Negative for dry skin, itching and rash.   Musculoskeletal: Negative for falls and muscle weakness.   Gastrointestinal: Negative for abdominal pain and bowel incontinence.   Genitourinary: Negative for bladder incontinence and nocturia.   Neurological: Negative for disturbances in coordination, loss of balance and seizures.   Psychiatric/Behavioral: Negative for depression. The patient does not have insomnia.    Allergic/Immunologic: Negative for hives and persistent infections.   All other systems negative      PAST MEDICAL HISTORY:   Past Medical History:   Diagnosis Date    ADHD (attention deficit hyperactivity disorder)     Asthma      PAST SURGICAL HISTORY: History reviewed. No pertinent surgical history.  FAMILY HISTORY:   Family History   Problem Relation Age of Onset    Breast cancer Mother     Thyroid cancer Father     Diabetes Father      SOCIAL HISTORY:   Social History     Socioeconomic History    Marital status: Single   Tobacco Use    Smoking status: Never    Smokeless tobacco: Never   Substance and Sexual Activity    Alcohol use: Yes    Drug use: Yes      Types: Amphetamines    Sexual activity: Yes     Partners: Female     Social Determinants of Health     Financial Resource Strain: Low Risk  (4/11/2022)    Overall Financial Resource Strain (CARDIA)     Difficulty of Paying Living Expenses: Not hard at all   Food Insecurity: No Food Insecurity (4/11/2022)    Hunger Vital Sign     Worried About Running Out of Food in the Last Year: Never true     Ran Out of Food in the Last Year: Never true   Transportation Needs: No Transportation Needs (4/11/2022)    PRAPARE - Transportation     Lack of Transportation (Medical): No     Lack of Transportation (Non-Medical): No   Physical Activity: Sufficiently Active (4/11/2022)    Exercise Vital Sign     Days of Exercise per Week: 3 days     Minutes of Exercise per Session: 100 min   Stress: No Stress Concern Present (4/11/2022)    [employer] - Occupational Stress Questionnaire     Feeling of Stress : Only a little   Social Connections: Unknown (4/11/2022)    Social Connection and Isolation Panel [NHANES]     Frequency of Communication with Friends and Family: More than three times a week     Frequency of Social Gatherings with Friends and Family: Twice a week     Active Member of Clubs or Organizations: Yes     Attends Club or Organization Meetings: More than 4 times per year     Marital Status: Living with partner   Housing Stability: Low Risk  (4/11/2022)    Housing Stability Vital Sign     Unable to Pay for Housing in the Last Year: No     Number of Places Lived in the Last Year: 1     Unstable Housing in the Last Year: No       MEDICATIONS:   Current Outpatient Medications:     (Magic mouthwash) 1:1:1 Benadryl 12.5mg/5ml liq, aluminum & magnesium hydroxide-simehticone (Maalox), LIDOcaine viscous 2%, Swish and spit 15 mLs every 4 (four) hours as needed. for mouth sores, Disp: 450 mL, Rfl: 0    dextroamphetamine-amphetamine (ADDERALL XR) 10 MG 24 hr capsule, Take 1 capsule (10 mg total) by mouth  "every morning., Disp: 30 capsule, Rfl: 0    dextroamphetamine-amphetamine (ADDERALL) 20 mg tablet, Take 1 tablet by mouth once daily., Disp: 30 tablet, Rfl: 0    diphth,pertus,acell,,tetanus (BOOSTRIX TDAP) 2.5-8-5 Lf-mcg-Lf/0.5mL Syrg injection, Inject into the muscle., Disp: 0.5 mL, Rfl: 0    triamcinolone acetonide 0.1% (KENALOG) 0.1 % cream, , Disp: , Rfl:     valACYclovir (VALTREX) 1000 MG tablet, For each outbreak, take 2 tablets by mouth once and repeat in 12 hours, Disp: 30 tablet, Rfl: 2    doxycycline (VIBRA-TABS) 100 MG tablet, Take 1 tablet (100 mg total) by mouth every 12 (twelve) hours., Disp: 56 tablet, Rfl: 0  ALLERGIES:   Review of patient's allergies indicates:   Allergen Reactions    Phenergan plain      Mom told him that when was when he was a baby he was alergic. Patient not sure of reaction.       VITAL SIGNS: /72   Pulse 60   Ht 6' 2" (1.88 m)   Wt 95.7 kg (210 lb 13.9 oz)   BMI 27.07 kg/m²           PHYSICAL EXAM:  General: Patient appears alert and oriented x 3.  Mood is pleasant.  Observation of ears, eyes and nose reveal no gross abnormalities. HEENT: NCAT, sclera nonicteric  Lungs: Respirations are equal and unlabored.  Well nourished, in no acute distress and ambulates with a non-antalgic gait with no assistive devices.    Skin: Skin intact bilaterally. Sensation intact bilaterally. Compartments soft. No evidence of edema, infection, or induration.      left ELBOW / WRIST EXTREMITY EXAM:     OBSERVATION / INSPECTION    Swelling                                          no swelling off olecranon bursa                                     Deformity                                         none  Discoloration                                   none                                      Scars                                                         none                                      Atrophy                                            none     TENDERNESS / CREPITUS (T / C):          "                                                                                    T/C                                                                                                                            Medial epicondyle                                                               - / -    Med. (Ulnar) collateral ligament                                        - / -    Flexor pronator Musculature                                              - / -   Biceps tendon                                                                    - / -   Head of radius                                                                   - / -    Lateral epicondyle                                                             - / -    Extensor Musculature                                                       - / -   Brachioradialis                                                                  - / -   Triceps tendon                                                                  - / -   Triceps muscle                                                                  - / -   Olecranon                                                                          - / -   Olecranon bursa                                                               -/ -   Cubital fossa                                                                     - / -   Anterior jointline                                                                - / -   Radial tunnel                                                                     -/-                                                                                                                                                                                                       ROM:             ('*' = with pain)                                   Right Elbow                                     AROM (PROM)                                             Extension                                        0 deg  (5  deg)   Flexion                                            145 deg (145 deg)                                                                Pronation                                         90 deg  (90 deg)                                                                 Supination                                       80 deg  (80 deg)                                                                                                                                                                                 Left Elbow                                       AROM (PROM)                                             Extension                                        0 deg  (5 deg)   Flexion                                             145 deg (145 deg)                                                                Pronation                                         90 deg  (90 deg)                                                                 Supination                                       80 deg  (80 deg)                Right Wrist                                      AROM (PROM)                       Extension                                        80 deg (85 deg)   Flexion                                            80 deg (85 deg)                                                                    Ulnar Deviation                               35 deg (40 deg)  Radial Deviation                             35 deg (40 deg)                                                                                                                                      Left Wrist                                         AROM (PROM)                                             Extension                                        80 deg (85 deg)   Flexion                                             80 deg (85 deg)                                                                    Ulnar Deviation                                35 deg (40 deg)  Radial  Deviation                              35 deg (40 deg)                                                                     STRENGTH:             ('*' = with pain)                        Elbow Flexion:                                                       5/5  Elbow Extension:                                                   5/5  Wrist Flexion:                                                         5/5  Wrist Extension:                                                     5/5  :                                                                       5/5  Intrinsics:                                                                5/5  EPL (Ext. pollicis longus):                                      5/5  Pinch Mechanism:                                                  5/5     ELBOW EXAMINATION:  See above noted areas of tenderness.   Tinel's (Percussion) Test - Cubital                           neg  Tennis Elbow Test                                                   neg  Golfer's Elbow Test                                                  neg  Radial Capitellar Grind Test                                     neg  Valgus/Extension Overload Test                              neg  Resisted Long Finger Extension Pain                      neg  Moving Valgus                                                          neg  Forearm pain with resisted supination                      neg             EXTREMITY NEURO-VASCULAR EXAMINATION: Sensation grossly intact to light touch all dermatomal regions. DTR 2+ Biceps, Triceps, BR and Negative Jade's sign. Grossly intact motor function at Elbow, Wrist and Hand. Distal pulses radial and ulnar 2+, brisk cap refill, symmetric.     Xrays:  Xrays of the 3 view left elbow were ordered and reviewed by me today. No fracture, subluxation, or other significant bony or joint abnormality is identified.     Other Findings:  No elbow erythema or swelling        ASSESSMENT:  Left elbow pain,  acute  Could be start of Olecranon bursitis of elbow.     Plan:    Precautions given.   She has a previous elbow compression sleeve that use on this elbow .     Aggressive ice compresses.   NSAIDs as needed.     Avoid excessive elbow activity.   RTC if symptoms worsen or do not improve.      All patients questions were answered. Patient was advised to call us with any concerns or questions.

## 2024-04-08 ENCOUNTER — PATIENT MESSAGE (OUTPATIENT)
Dept: GASTROENTEROLOGY | Facility: CLINIC | Age: 59
End: 2024-04-08
Payer: COMMERCIAL

## 2024-04-18 ENCOUNTER — HOSPITAL ENCOUNTER (EMERGENCY)
Facility: OTHER | Age: 59
Discharge: HOME | End: 2024-04-18
Attending: EMERGENCY MEDICINE
Payer: COMMERCIAL

## 2024-04-18 VITALS
DIASTOLIC BLOOD PRESSURE: 60 MMHG | HEIGHT: 67 IN | OXYGEN SATURATION: 95 % | TEMPERATURE: 98 F | RESPIRATION RATE: 16 BRPM | BODY MASS INDEX: 29.51 KG/M2 | HEART RATE: 48 BPM | WEIGHT: 188 LBS | SYSTOLIC BLOOD PRESSURE: 112 MMHG

## 2024-04-18 DIAGNOSIS — R10.11 RIGHT UPPER QUADRANT ABDOMINAL PAIN: Primary | ICD-10-CM

## 2024-04-18 LAB
ALBUMIN SERPL BCP-MCNC: 4.6 G/DL (ref 3.5–5.2)
ALP SERPL-CCNC: 77 U/L (ref 55–135)
ALT SERPL W/O P-5'-P-CCNC: 32 U/L (ref 10–44)
ANION GAP SERPL CALC-SCNC: 12 MMOL/L (ref 8–16)
AST SERPL-CCNC: 25 U/L (ref 10–40)
BASOPHILS # BLD AUTO: 0.03 K/UL (ref 0–0.2)
BASOPHILS NFR BLD: 0.4 % (ref 0–1.9)
BILIRUB SERPL-MCNC: 0.7 MG/DL (ref 0.1–1)
BILIRUB UR QL STRIP: NEGATIVE
BUN SERPL-MCNC: 16 MG/DL (ref 6–20)
CALCIUM SERPL-MCNC: 10.5 MG/DL (ref 8.7–10.5)
CHLORIDE SERPL-SCNC: 106 MMOL/L (ref 95–110)
CLARITY UR: CLEAR
CO2 SERPL-SCNC: 23 MMOL/L (ref 23–29)
COLOR UR: YELLOW
CREAT SERPL-MCNC: 0.9 MG/DL (ref 0.5–1.4)
DIFFERENTIAL METHOD BLD: NORMAL
EOSINOPHIL # BLD AUTO: 0.2 K/UL (ref 0–0.5)
EOSINOPHIL NFR BLD: 2.5 % (ref 0–8)
ERYTHROCYTE [DISTWIDTH] IN BLOOD BY AUTOMATED COUNT: 13.4 % (ref 11.5–14.5)
EST. GFR  (NO RACE VARIABLE): >60 ML/MIN/1.73 M^2
GLUCOSE SERPL-MCNC: 101 MG/DL (ref 70–110)
GLUCOSE UR QL STRIP: NEGATIVE
HCT VFR BLD AUTO: 42.1 % (ref 37–48.5)
HGB BLD-MCNC: 13.5 G/DL (ref 12–16)
HGB UR QL STRIP: NEGATIVE
IMM GRANULOCYTES # BLD AUTO: 0.02 K/UL (ref 0–0.04)
IMM GRANULOCYTES NFR BLD AUTO: 0.3 % (ref 0–0.5)
KETONES UR QL STRIP: NEGATIVE
LEUKOCYTE ESTERASE UR QL STRIP: NEGATIVE
LIPASE SERPL-CCNC: 18 U/L (ref 4–60)
LYMPHOCYTES # BLD AUTO: 2.5 K/UL (ref 1–4.8)
LYMPHOCYTES NFR BLD: 37.7 % (ref 18–48)
MCH RBC QN AUTO: 27.1 PG (ref 27–31)
MCHC RBC AUTO-ENTMCNC: 32.1 G/DL (ref 32–36)
MCV RBC AUTO: 84 FL (ref 82–98)
MONOCYTES # BLD AUTO: 0.4 K/UL (ref 0.3–1)
MONOCYTES NFR BLD: 5.5 % (ref 4–15)
NEUTROPHILS # BLD AUTO: 3.6 K/UL (ref 1.8–7.7)
NEUTROPHILS NFR BLD: 53.6 % (ref 38–73)
NITRITE UR QL STRIP: NEGATIVE
NRBC BLD-RTO: 0 /100 WBC
PH UR STRIP: 8 [PH] (ref 5–8)
PLATELET # BLD AUTO: 253 K/UL (ref 150–450)
PMV BLD AUTO: 9.8 FL (ref 9.2–12.9)
POTASSIUM SERPL-SCNC: 4.7 MMOL/L (ref 3.5–5.1)
PROT SERPL-MCNC: 7.6 G/DL (ref 6–8.4)
PROT UR QL STRIP: NEGATIVE
RBC # BLD AUTO: 4.99 M/UL (ref 4–5.4)
SODIUM SERPL-SCNC: 141 MMOL/L (ref 136–145)
SP GR UR STRIP: 1.01 (ref 1–1.03)
URN SPEC COLLECT METH UR: NORMAL
UROBILINOGEN UR STRIP-ACNC: NEGATIVE EU/DL
WBC # BLD AUTO: 6.71 K/UL (ref 3.9–12.7)

## 2024-04-18 PROCEDURE — 96374 THER/PROPH/DIAG INJ IV PUSH: CPT | Mod: 59

## 2024-04-18 PROCEDURE — 25000003 PHARM REV CODE 250: Performed by: EMERGENCY MEDICINE

## 2024-04-18 PROCEDURE — 83690 ASSAY OF LIPASE: CPT | Performed by: PHYSICIAN ASSISTANT

## 2024-04-18 PROCEDURE — 81003 URINALYSIS AUTO W/O SCOPE: CPT | Performed by: PHYSICIAN ASSISTANT

## 2024-04-18 PROCEDURE — 85025 COMPLETE CBC W/AUTO DIFF WBC: CPT | Performed by: PHYSICIAN ASSISTANT

## 2024-04-18 PROCEDURE — 99285 EMERGENCY DEPT VISIT HI MDM: CPT | Mod: 25

## 2024-04-18 PROCEDURE — 25500020 PHARM REV CODE 255: Performed by: EMERGENCY MEDICINE

## 2024-04-18 PROCEDURE — 96375 TX/PRO/DX INJ NEW DRUG ADDON: CPT

## 2024-04-18 PROCEDURE — 63600175 PHARM REV CODE 636 W HCPCS: Performed by: EMERGENCY MEDICINE

## 2024-04-18 PROCEDURE — 80053 COMPREHEN METABOLIC PANEL: CPT | Performed by: PHYSICIAN ASSISTANT

## 2024-04-18 RX ORDER — KETOROLAC TROMETHAMINE 30 MG/ML
15 INJECTION, SOLUTION INTRAMUSCULAR; INTRAVENOUS
Status: COMPLETED | OUTPATIENT
Start: 2024-04-18 | End: 2024-04-18

## 2024-04-18 RX ORDER — OXYCODONE AND ACETAMINOPHEN 5; 325 MG/1; MG/1
1 TABLET ORAL EVERY 6 HOURS PRN
Qty: 12 TABLET | Refills: 0 | Status: SHIPPED | OUTPATIENT
Start: 2024-04-18 | End: 2024-04-19 | Stop reason: RX

## 2024-04-18 RX ORDER — HYDROMORPHONE HYDROCHLORIDE 1 MG/ML
1 INJECTION, SOLUTION INTRAMUSCULAR; INTRAVENOUS; SUBCUTANEOUS
Status: COMPLETED | OUTPATIENT
Start: 2024-04-18 | End: 2024-04-18

## 2024-04-18 RX ORDER — HYOSCYAMINE SULFATE 0.12 MG/1
0.12 TABLET SUBLINGUAL
Status: COMPLETED | OUTPATIENT
Start: 2024-04-18 | End: 2024-04-18

## 2024-04-18 RX ORDER — DROPERIDOL 2.5 MG/ML
0.62 INJECTION, SOLUTION INTRAMUSCULAR; INTRAVENOUS
Status: COMPLETED | OUTPATIENT
Start: 2024-04-18 | End: 2024-04-18

## 2024-04-18 RX ORDER — OXYCODONE AND ACETAMINOPHEN 5; 325 MG/1; MG/1
1 TABLET ORAL
Status: COMPLETED | OUTPATIENT
Start: 2024-04-18 | End: 2024-04-18

## 2024-04-18 RX ORDER — ONDANSETRON HYDROCHLORIDE 2 MG/ML
4 INJECTION, SOLUTION INTRAVENOUS
Status: COMPLETED | OUTPATIENT
Start: 2024-04-18 | End: 2024-04-18

## 2024-04-18 RX ADMIN — ONDANSETRON 4 MG: 2 INJECTION INTRAMUSCULAR; INTRAVENOUS at 11:04

## 2024-04-18 RX ADMIN — HYOSCYAMINE SULFATE 0.12 MG: 0.12 TABLET ORAL at 01:04

## 2024-04-18 RX ADMIN — HYDROMORPHONE HYDROCHLORIDE 1 MG: 1 INJECTION, SOLUTION INTRAMUSCULAR; INTRAVENOUS; SUBCUTANEOUS at 11:04

## 2024-04-18 RX ADMIN — OXYCODONE HYDROCHLORIDE AND ACETAMINOPHEN 1 TABLET: 5; 325 TABLET ORAL at 02:04

## 2024-04-18 RX ADMIN — IOHEXOL 100 ML: 350 INJECTION, SOLUTION INTRAVENOUS at 12:04

## 2024-04-18 RX ADMIN — DROPERIDOL 0.62 MG: 2.5 INJECTION, SOLUTION INTRAMUSCULAR; INTRAVENOUS at 01:04

## 2024-04-18 RX ADMIN — KETOROLAC TROMETHAMINE 15 MG: 30 INJECTION, SOLUTION INTRAMUSCULAR; INTRAVENOUS at 01:04

## 2024-04-18 NOTE — ED TRIAGE NOTES
RUQ abdominal pain radiating to right flank since 0300 today. States initially intermittent, now constant with nausea. No vomiting or fever reported. States h/o multiple abdominal surgeries in past with adhesions but states she still has her gallbladder. LBM yesterday - WNL. Denies urinary symptoms. Presents awake, alert.

## 2024-04-18 NOTE — ED PROVIDER NOTES
Encounter Date: 2024    SCRIBE #1 NOTE: I, Aileen Goyal, pedro scribing for, and in the presence of,  Jose Elias Sr MD. I have scribed the following portions of the note - Other sections scribed: HPI, ROS, PE.       History     Chief Complaint   Patient presents with    Abdominal Pain     Pt c/o RUQ/flank pain since 300. States she's had some similar pain lower down before and is being worked up for IBS. Endorsing nausea and bloating. Denying constipation, diarrhea, dysuria, fever.     Time seen by provider: 11:31 AM    This is a 58 y.o. female with chronic abdominal pain and remote PE who presents with complaint of right upper abdominal pain since yesterday. She describes this as pulsing, waxing and waning, worsening with movement, and unchanged with meals.  Started yesterday afternoon and became severe and woke her up at 3:00 a.m., persistent since then.  No associated fever, changes in bowel movements, or dysuria.  She reports nausea when pain becomes severe but no vomiting. Patient states that this is different than her typical abdominal pain, which is usually in the right lower quadrant, and this is more in the right upper quadrant.  She has had multiple abdominal surgeries but still has her gallbladder. This is the extent of the patient's complaints at this time.    The history is provided by the patient.     Review of patient's allergies indicates:   Allergen Reactions    Morphine Itching     Past Medical History:   Diagnosis Date    Arrhythmia     bigeminy & trigeminy     followed cardiologist    Pulmonary embolism     Bilateral     Past Surgical History:   Procedure Laterality Date    APPENDECTOMY       SECTION N/A     HYSTERECTOMY Bilateral     LAPAROSCOPY W/ MINI-LAPAROTOMY Right     SHOULDER ARTHROSCOPY       Family History   Problem Relation Name Age of Onset    Esophageal cancer Mother      Colon cancer Neg Hx       Social History     Tobacco Use    Smoking status: Never    Smokeless  tobacco: Never   Substance Use Topics    Alcohol use: Yes     Comment: socially    Drug use: Never     Review of Systems   Constitutional:  Negative for fever.   HENT:  Negative for sore throat.    Respiratory:  Negative for shortness of breath.    Cardiovascular:  Negative for chest pain.   Gastrointestinal:  Positive for abdominal pain. Negative for nausea.   Genitourinary:  Negative for dysuria.   Musculoskeletal:  Negative for back pain.   Skin:  Negative for rash.   Neurological:  Negative for weakness.   Hematological:  Does not bruise/bleed easily.       Physical Exam     Initial Vitals [04/18/24 0935]   BP Pulse Resp Temp SpO2   (!) 140/91 64 19 98.3 °F (36.8 °C) 97 %      MAP       --         Physical Exam    Constitutional: She appears well-developed and well-nourished. She is not diaphoretic. She appears distressed.   HENT:   Head: Normocephalic and atraumatic.   Eyes: Conjunctivae are normal.   Neck: Neck supple.   Cardiovascular:  Normal rate, regular rhythm, S1 normal, S2 normal, normal heart sounds and intact distal pulses.           No murmur heard.  Pulmonary/Chest: Breath sounds normal. No respiratory distress. She has no wheezes. She has no rhonchi. She has no rales.   Abdominal: Abdomen is soft. There is abdominal tenderness.   Right mid abdominal tenderness, negative Matias's, no focal right lower quadrant tenderness. There is no rebound and no guarding.   Musculoskeletal:         General: No edema.      Cervical back: Neck supple.     Neurological: She is alert and oriented to person, place, and time.   Skin: Skin is warm and dry.   Psychiatric: She has a normal mood and affect.         ED Course   Procedures  Labs Reviewed   CBC W/ AUTO DIFFERENTIAL   COMPREHENSIVE METABOLIC PANEL   LIPASE   URINALYSIS, REFLEX TO URINE CULTURE    Narrative:     Specimen Source->Urine          Imaging Results              CT Abdomen Pelvis With IV Contrast NO Oral Contrast (Final result)  Result time 04/18/24  12:24:23      Final result by Zack Morris III, MD (04/18/24 12:24:23)                   Impression:      No acute process seen.    Small hiatal hernia.    Small gallstone versus polyp in the gallbladder fundus.    Diverticulosis but no acute process seen.      Electronically signed by: Zack Morris MD  Date:    04/18/2024  Time:    12:24               Narrative:    EXAMINATION:  CT ABDOMEN PELVIS WITH IV CONTRAST    CLINICAL HISTORY:  Bowel obstruction suspected;    FINDINGS:  Comparison is 02/13/2014.    Patient was administered 100 cc of Omnipaque 350 intravenously.    Lung bases are clear.  Liver demonstrates nothing unusual.  There is a possible small gallstone versus gallbladder wall polyp in the fundus.  There is a small hiatal hernia.  The spleen, pancreas, duodenum, and biliary tree show nothing unusual.  The adrenal glands are not enlarged.  The kidneys demonstrate no significant abnormalities.  No hydronephrosis is seen.  The vessels enhance normally.  No para-aortic, retroperitoneal, or mesenteric adenopathy is seen.  There is mild diverticulosis.  No obstruction, ileus, perforation, ascites, abscess, or inflammation seen.  The bones reveal DJD.                                       Medications   HYDROmorphone injection 1 mg (1 mg Intravenous Given 4/18/24 1155)   ondansetron injection 4 mg (4 mg Intravenous Given 4/18/24 1155)   iohexoL (OMNIPAQUE 350) injection 100 mL (100 mLs Intravenous Given 4/18/24 1209)   ketorolac injection 15 mg (15 mg Intravenous Given 4/18/24 1340)   hyoscyamine ODT tablet 0.125 mg (0.125 mg Oral Given 4/18/24 1339)   droPERidol injection 0.625 mg (0.625 mg Intravenous Given 4/18/24 1339)   oxyCODONE-acetaminophen 5-325 mg per tablet 1 tablet (1 tablet Oral Given 4/18/24 1439)     Medical Decision Making      58-year-old female presents to the ED with worsening right-sided abdominal pain that started yesterday.  Patient has long history of chronic right lower quadrant  abdominal pain and has been seen by GI with no clear etiology.  Per extensive chart review, she was initially thought to have possible endometriosis, but had improvement of pain after lysis of adhesions about 10 years ago.  However, s/p appendectomy about 8-9 years ago she had recurrent worsening lower abdominal pain that has been episodic and chronic since then.  She has been seen for this in the past with normal imaging, is awaiting EGD by GI.  More recently, she was seen at [facility] 2 months ago for left lower quadrant pain and treated with diverticulitis with improvement of this.  She was otherwise at baseline when she started to have colicky right upper/mid abdominal pain yesterday, with associated nausea when pain became severe.  This pain is located superior to where her typical chronic pain is, and radiating to her right flank as well, though she denies any urinary symptoms.  She denies any fevers, constipation/diarrhea, URI symptoms, or other new complaints.  On exam patient with right mid abdominal tenderness, no acute abdomen, negative Matias's sign.  She is afebrile but in significant distress due to pain.  Differential diagnosis includes acute cholecystitis, biliary colic, renal colic, bowel spasm, chronic abdominal pain flare.  Per extensive chart review, per GI note chronic pain thought to be from adhesions from her multiple abdominal surgeries; she could have pain today from this, versus ileus or partial SBO.      Initial labs reassuring with no elevated WBC or anemia, normal CMP with no elevated LFTs/lipase.  However, patient still had significant pain after IV morphine.  CT done for further evaluation that shows no acute process, only a small hiatal hernia, and small gallstone versus polyp in the gallbladder but no signs of acute cholecystitis or diverticulitis, no clear etiology for her symptoms.  Patient was then treated with IV Toradol, low-dose droperidol, and less than with further  improvement of her pain.  Unclear etiology of symptoms, though I suspect based on her multiple previous abdominal surgeries and endometriosis that adhesions may be contributory, especially since he had improvement of previous episodes of pain with lysis surgery in the past.  She has EGD scheduled for 1 week from today, so I discussed with her discharge with pain medications until then, versus admission to our ED observation unit for further pain control and inpatient GI consult.  She prefers trial of discharge home and will return for any worsening pain until GI follow-up, will discharge with Percocet as needed, and she was advised of potential side effects including constipation and somnolence, itching.       Amount and/or Complexity of Data Reviewed  External Data Reviewed: notes.  Labs: ordered. Decision-making details documented in ED Course.  Radiology: ordered.    Risk  Prescription drug management.            Scribe Attestation:   Scribe #1: I performed the above scribed service and the documentation accurately describes the services I performed. I attest to the accuracy of the note.    I, Dr. Jose Elias Sr, personally performed the services described in this documentation. All medical record entries made by the scribe were at my direction and in my presence.  I have reviewed the chart and agree that the record reflects my personal performance and is accurate and complete. Jose Elias Sr MD.                                  Clinical Impression:  Final diagnoses:  [R10.11] Right upper quadrant abdominal pain (Primary)          ED Disposition Condition    Discharge Stable          ED Prescriptions       Medication Sig Dispense Start Date End Date Auth. Provider    oxyCODONE-acetaminophen (PERCOCET) 5-325 mg per tablet Take 1 tablet by mouth every 6 (six) hours as needed for Pain. 12 tablet 4/18/2024 -- Jose Elias Sr MD          Follow-up Information       Follow up With Specialties Details Why  Contact Info    [facility] Emergency Dept Emergency Medicine Go to  If symptoms worsen [address]  [facility] [address] [phone]    Chucho Bennett MD Gastroenterology Call in 1 day  [address] [facility] [address]  [phone]               Jose Elias Sr MD  04/18/24 2233

## 2024-04-18 NOTE — FIRST PROVIDER EVALUATION
Emergency Department TeleTriage Encounter Note      CHIEF COMPLAINT    Chief Complaint   Patient presents with    Abdominal Pain     Pt c/o RUQ/flank pain since 0300. States she's had some similar pain lower down before and is being worked up for IBS. Endorsing nausea and bloating. Denying constipation, diarrhea, dysuria, fever.       VITAL SIGNS   Initial Vitals [04/18/24 0935]   BP Pulse Resp Temp SpO2   (!) 140/91 64 19 98.3 °F (36.8 °C) 97 %      MAP       --            ALLERGIES    Review of patient's allergies indicates:   Allergen Reactions    Morphine Itching       PROVIDER TRIAGE NOTE  Patient presents with complaint of abdmoinal pain/flank pain for 6 hours. Nausea with no V/D. No urinary symptoms.      Phy:   Constitutional: well nourished, well developed, appearing stated age, NAD        Initial orders will be placed and care will be transferred to an alternate provider when patient is roomed for a full evaluation. Any additional orders and the final disposition will be determined by that provider.        ORDERS  Labs Reviewed - No data to display    ED Orders (720h ago, onward)      None              Virtual Visit Note: The provider triage portion of this emergency department evaluation and documentation was performed via [employer], a HIPAA-compliant telemedicine application, in concert with a tele-presenter in the room. A face to face patient evaluation with one of my colleagues will occur once the patient is placed in an emergency department room.      DISCLAIMER: This note was prepared with [employer] voice recognition transcription software. Garbled syntax, mangled pronouns, and other bizarre constructions may be attributed to that software system.

## 2024-04-19 ENCOUNTER — TELEPHONE (OUTPATIENT)
Dept: GASTROENTEROLOGY | Facility: CLINIC | Age: 59
End: 2024-04-19
Payer: COMMERCIAL

## 2024-04-19 DIAGNOSIS — R10.11 RIGHT UPPER QUADRANT ABDOMINAL PAIN: Primary | ICD-10-CM

## 2024-04-19 RX ORDER — OXYCODONE AND ACETAMINOPHEN 5; 325 MG/1; MG/1
1 TABLET ORAL EVERY 6 HOURS PRN
Qty: 12 TABLET | Refills: 0 | Status: SHIPPED | OUTPATIENT
Start: 2024-04-19 | End: 2024-06-19 | Stop reason: SDUPTHER

## 2024-04-19 NOTE — TELEPHONE ENCOUNTER
Talked to the patient by phone.  Discussed recent symptoms and ED visits.  Records reviewed from those visits.  Cause of pain is unclear.  Labs were normal.  Last CT was without inflammatory changes.  Would need to proceed with EGD and colonoscopy as planned coming up this week.

## 2024-04-25 ENCOUNTER — HOSPITAL ENCOUNTER (OUTPATIENT)
Facility: HOSPITAL | Age: 59
Discharge: HOME | End: 2024-04-25
Attending: INTERNAL MEDICINE | Admitting: INTERNAL MEDICINE
Payer: COMMERCIAL

## 2024-04-25 ENCOUNTER — ANESTHESIA (OUTPATIENT)
Dept: ENDOSCOPY | Facility: HOSPITAL | Age: 59
End: 2024-04-25
Payer: COMMERCIAL

## 2024-04-25 ENCOUNTER — ANESTHESIA EVENT (OUTPATIENT)
Dept: ENDOSCOPY | Facility: HOSPITAL | Age: 59
End: 2024-04-25
Payer: COMMERCIAL

## 2024-04-25 VITALS
TEMPERATURE: 98 F | HEIGHT: 67 IN | DIASTOLIC BLOOD PRESSURE: 62 MMHG | SYSTOLIC BLOOD PRESSURE: 122 MMHG | RESPIRATION RATE: 12 BRPM | WEIGHT: 182 LBS | HEART RATE: 60 BPM | BODY MASS INDEX: 28.56 KG/M2 | OXYGEN SATURATION: 97 %

## 2024-04-25 DIAGNOSIS — R10.9 ABDOMINAL PAIN, UNSPECIFIED ABDOMINAL LOCATION: Primary | ICD-10-CM

## 2024-04-25 DIAGNOSIS — R10.9 ABDOMINAL PAIN: ICD-10-CM

## 2024-04-25 PROCEDURE — 45378 DIAGNOSTIC COLONOSCOPY: CPT | Mod: ,,, | Performed by: INTERNAL MEDICINE

## 2024-04-25 PROCEDURE — 45378 DIAGNOSTIC COLONOSCOPY: CPT | Performed by: INTERNAL MEDICINE

## 2024-04-25 PROCEDURE — 37000008 HC ANESTHESIA 1ST 15 MINUTES: Performed by: INTERNAL MEDICINE

## 2024-04-25 PROCEDURE — D9220A PRA ANESTHESIA: Mod: ANES,,, | Performed by: ANESTHESIOLOGY

## 2024-04-25 PROCEDURE — 88305 TISSUE EXAM BY PATHOLOGIST: CPT | Mod: 26,,, | Performed by: PATHOLOGY

## 2024-04-25 PROCEDURE — 88305 TISSUE EXAM BY PATHOLOGIST: CPT | Mod: 59 | Performed by: PATHOLOGY

## 2024-04-25 PROCEDURE — 27201012 HC FORCEPS, HOT/COLD, DISP: Performed by: INTERNAL MEDICINE

## 2024-04-25 PROCEDURE — 63600175 PHARM REV CODE 636 W HCPCS: Performed by: NURSE ANESTHETIST, CERTIFIED REGISTERED

## 2024-04-25 PROCEDURE — 43239 EGD BIOPSY SINGLE/MULTIPLE: CPT | Mod: 51,,, | Performed by: INTERNAL MEDICINE

## 2024-04-25 PROCEDURE — 37000009 HC ANESTHESIA EA ADD 15 MINS: Performed by: INTERNAL MEDICINE

## 2024-04-25 PROCEDURE — 25000003 PHARM REV CODE 250: Performed by: NURSE ANESTHETIST, CERTIFIED REGISTERED

## 2024-04-25 PROCEDURE — 43239 EGD BIOPSY SINGLE/MULTIPLE: CPT | Performed by: INTERNAL MEDICINE

## 2024-04-25 PROCEDURE — D9220A PRA ANESTHESIA: Mod: CRNA,,, | Performed by: NURSE ANESTHETIST, CERTIFIED REGISTERED

## 2024-04-25 RX ORDER — FENTANYL CITRATE 50 UG/ML
INJECTION, SOLUTION INTRAMUSCULAR; INTRAVENOUS
Status: DISCONTINUED | OUTPATIENT
Start: 2024-04-25 | End: 2024-04-25

## 2024-04-25 RX ORDER — SODIUM CHLORIDE 9 MG/ML
INJECTION, SOLUTION INTRAVENOUS CONTINUOUS
Status: DISCONTINUED | OUTPATIENT
Start: 2024-04-25 | End: 2024-04-25 | Stop reason: HOSPADM

## 2024-04-25 RX ORDER — LIDOCAINE HYDROCHLORIDE 20 MG/ML
INJECTION, SOLUTION EPIDURAL; INFILTRATION; INTRACAUDAL; PERINEURAL
Status: DISCONTINUED | OUTPATIENT
Start: 2024-04-25 | End: 2024-04-25

## 2024-04-25 RX ORDER — EPHEDRINE SULFATE 50 MG/ML
INJECTION, SOLUTION INTRAVENOUS
Status: DISCONTINUED | OUTPATIENT
Start: 2024-04-25 | End: 2024-04-25

## 2024-04-25 RX ORDER — PROPOFOL 10 MG/ML
VIAL (ML) INTRAVENOUS CONTINUOUS PRN
Status: DISCONTINUED | OUTPATIENT
Start: 2024-04-25 | End: 2024-04-25

## 2024-04-25 RX ORDER — PHENYLEPHRINE HCL IN 0.9% NACL 1 MG/10 ML
SYRINGE (ML) INTRAVENOUS
Status: DISCONTINUED | OUTPATIENT
Start: 2024-04-25 | End: 2024-04-25

## 2024-04-25 RX ORDER — SODIUM CHLORIDE 0.9 % (FLUSH) 0.9 %
10 SYRINGE (ML) INJECTION
Status: DISCONTINUED | OUTPATIENT
Start: 2024-04-25 | End: 2024-04-25 | Stop reason: HOSPADM

## 2024-04-25 RX ADMIN — FENTANYL CITRATE 25 MCG: 50 INJECTION INTRAMUSCULAR; INTRAVENOUS at 01:04

## 2024-04-25 RX ADMIN — EPHEDRINE SULFATE 5 MG: 50 INJECTION INTRAVENOUS at 01:04

## 2024-04-25 RX ADMIN — SODIUM CHLORIDE: 9 INJECTION, SOLUTION INTRAVENOUS at 01:04

## 2024-04-25 RX ADMIN — PROPOFOL 200 MCG/KG/MIN: 10 INJECTION, EMULSION INTRAVENOUS at 01:04

## 2024-04-25 RX ADMIN — LIDOCAINE HYDROCHLORIDE 60 MG: 20 INJECTION, SOLUTION EPIDURAL; INFILTRATION; INTRACAUDAL at 01:04

## 2024-04-25 RX ADMIN — PROPOFOL 50 MG: 10 INJECTION, EMULSION INTRAVENOUS at 01:04

## 2024-04-25 RX ADMIN — GLYCOPYRROLATE 0.2 MG: 0.2 INJECTION INTRAMUSCULAR; INTRAVENOUS at 01:04

## 2024-04-25 RX ADMIN — Medication 100 MCG: at 01:04

## 2024-04-25 NOTE — H&P
Short Stay Endoscopy History and Physical    PCP - Marie Steinberg MD    Procedure - EGD/Colonoscopy  ASA - per anesthesia  Mallampati - per anesthesia  History of Anesthesia problems - no  Family history Anesthesia problems -  no   Plan of anesthesia - MAC    HPI:  This is a 58 y.o. female here for evaluation of abdominal pain.       ROS:  Constitutional: No fevers, chills  CV: No chest pain  Pulm: No cough, No shortness of breath  Ophtho: No vision changes  GI: see HPI    Medical History:  has a past medical history of Arrhythmia and Pulmonary embolism.    Surgical History:  has a past surgical history that includes Shoulder arthroscopy; Hysterectomy (Bilateral); Laparoscopy w/ mini-laparotomy (Right);  section (N/A); and Appendectomy.    Family History: family history includes Esophageal cancer in her mother.. Otherwise no colon cancer, inflammatory bowel disease, or GI malignancies.    Social History:  reports that she has never smoked. She has never used smokeless tobacco. She reports current alcohol use. She reports that she does not use drugs.    Review of patient's allergies indicates:   Allergen Reactions    Morphine Itching       Medications:   Medications Prior to Admission   Medication Sig Dispense Refill Last Dose    biotin 5 mg Tab Take 5 mg by mouth.   Past Week    losartan (COZAAR) 25 MG tablet Take 25 mg by mouth every evening.    2024    meloxicam (MOBIC) 15 MG tablet Take 1 tablet (15 mg total) by mouth once daily. 30 tablet 3 2024    omega-3 fatty acids/fish oil (FISH OIL-OMEGA-3 FATTY ACIDS) 300-1,000 mg capsule Take 2 g by mouth.   2024    oxyCODONE-acetaminophen (PERCOCET) 5-325 mg per tablet Take 1 tablet by mouth every 6 (six) hours as needed for Pain. 12 tablet 0 Past Week    rosuvastatin (CRESTOR) 5 MG tablet Take 5 mg by mouth once daily.   2024    amoxicillin-clavulanate 875-125mg (AUGMENTIN) 875-125 mg per tablet Take 1 tablet by mouth 2 (two) times  "daily. 14 tablet 0     dicyclomine (BENTYL) 10 MG capsule Take 10 mg by mouth every 6 (six) hours as needed.       estradioL (ESTRACE) 0.01 % (0.1 mg/gram) vaginal cream estradiol 0.01% (0.1 mg/gram) vaginal cream       fluticasone propionate (FLONASE) 50 mcg/actuation nasal spray fluticasone propionate 50 mcg/actuation nasal spray,suspension       ondansetron (ZOFRAN-ODT) 4 MG TbDL Take 1 tablet (4 mg total) by mouth 2 (two) times daily. 12 tablet 0     TURMERIC ORAL Take 1,000 mg by mouth.          Physical Exam:    Vital Signs:   Vitals:    04/25/24 1236   BP: (!) 148/70   Pulse: 61   Resp: 16   Temp: 98 °F (36.7 °C)       General Appearance: Well appearing in no acute distress  Eyes:    No scleral icterus  Lungs: CTA anteriorly  Heart:  Regular rate and rhythm  Abdomen: Soft, non tender, non distended with normal bowel sounds.    Labs:  Lab Results   Component Value Date    WBC 6.71 04/18/2024    HGB 13.5 04/18/2024    HCT 42.1 04/18/2024    MCV 84 04/18/2024     04/18/2024        BMP  Lab Results   Component Value Date     04/18/2024    K 4.7 04/18/2024     04/18/2024    CO2 23 04/18/2024    BUN 16 04/18/2024    CREATININE 0.9 04/18/2024    CALCIUM 10.5 04/18/2024    ANIONGAP 12 04/18/2024    ESTGFRAFRICA 85 (L) 09/27/2022     No results found for: "INR", "PROTIME"       Assessment:  58 y.o. female with abdominal pain.     Plan:  Proceed with EGD and colonoscopy today.  I have explained the risks and benefits of endoscopy procedures to the patient including but not limited to bleeding, perforation, infection, and death.  All questions answered.        Chucho Bennett MD   "

## 2024-04-25 NOTE — TRANSFER OF CARE
"Anesthesia Transfer of Care Note    Patient: Estela Vázquez    Procedure(s) Performed: Procedure(s) (LRB):  EGD (ESOPHAGOGASTRODUODENOSCOPY) (N/A)  COLONOSCOPY (N/A)    Patient location: PACU    Anesthesia Type: general    Transport from OR: Transported from OR on room air with adequate spontaneous ventilation    Post pain: adequate analgesia    Post assessment: no apparent anesthetic complications and tolerated procedure well    Post vital signs: stable    Level of consciousness: awake, alert and oriented    Nausea/Vomiting: no nausea/vomiting    Complications: none    Transfer of care protocol was followed      Last vitals: Visit Vitals  BP (!) 103/56   Pulse 67   Temp 36.5 °C (97.7 °F) (Skin)   Resp 12   Ht 5' 7" (1.702 m)   Wt 82.6 kg (182 lb)   SpO2 96%   Breastfeeding No   BMI 28.51 kg/m²     "

## 2024-04-25 NOTE — PROVATION PATIENT INSTRUCTIONS
Discharge Summary/Instructions after an Endoscopic Procedure  Patient Name: Estela Velazco  Patient MRN: [id number]  Patient YOB: 1965 Thursday, April 25, 2024  Chucho Bennett MD  Dear patient,  As a result of recent federal legislation (The Federal Cures Act), you may   receive lab or pathology results from your procedure in your [facility]   account before your physician is able to contact you. Your physician or   their representative will relay the results to you with their   recommendations at their soonest availability.  Thank you,  RESTRICTIONS:  During your procedure today, you received medications for sedation.  These   medications may affect your judgment, balance and coordination.  Therefore,   for 24 hours, you have the following restrictions:   - DO NOT drive a car, operate machinery, make legal/financial decisions,   sign important papers or drink alcohol.    ACTIVITY:  Today: no heavy lifting, straining or running due to procedural   sedation/anesthesia.  The following day: return to full activity including work.  DIET:  Eat and drink normally unless instructed otherwise.     TREATMENT FOR COMMON SIDE EFFECTS:  - Mild abdominal pain, nausea, belching, bloating or excessive gas:  rest,   eat lightly and use a heating pad.  - Sore Throat: treat with throat lozenges and/or gargle with warm salt   water.  - Because air was used during the procedure, expelling large amounts of air   from your rectum or belching is normal.  - If a bowel prep was taken, you may not have a bowel movement for 1-3 days.    This is normal.  SYMPTOMS TO WATCH FOR AND REPORT TO YOUR PHYSICIAN:  1. Abdominal pain or bloating, other than gas cramps.  2. Chest pain.  3. Back pain.  4. Signs of infection such as: chills or fever occurring within 24 hours   after the procedure.  5. Rectal bleeding, which would show as bright red, maroon, or black stools.   (A tablespoon of blood from the rectum is not serious, especially  if   hemorrhoids are present.)  6. Vomiting.  7. Weakness or dizziness.  GO DIRECTLY TO THE NEAREST EMERGENCY ROOM IF YOU HAVE ANY OF THE FOLLOWING:      Difficulty breathing              Chills and/or fever over 101 F   Persistent vomiting and/or vomiting blood   Severe abdominal pain   Severe chest pain   Black, tarry stools   Bleeding- more than one tablespoon   Any other symptom or condition that you feel may need urgent attention  Your doctor recommends these additional instructions:  If any biopsies were taken, your doctors clinic will contact you in 1 to 2   weeks with any results.  - Await pathology results.   - Perform a colonoscopy now.   - See colonoscopy report for further recommendations.  For questions, problems or results please call your physician - Chucho Bennett MD at Work:  [phone].  [facility], EMERGENCY ROOM PHONE NUMBER: [phone]  IF A COMPLICATION OR EMERGENCY SITUATION ARISES AND YOU ARE UNABLE TO REACH   YOUR PHYSICIAN - GO DIRECTLY TO THE EMERGENCY ROOM.  Chucho Bennett MD  4/25/2024 2:20:30 PM  This report has been verified and signed electronically.  Dear patient,  As a result of recent federal legislation (The Federal Cures Act), you may   receive lab or pathology results from your procedure in your [facility]   account before your physician is able to contact you. Your physician or   their representative will relay the results to you with their   recommendations at their soonest availability.  Thank you,  PROVATION   Ester Dooley (DO)  Pediatrics  [address] [place] [address]  Follow Up Time:

## 2024-04-25 NOTE — ANESTHESIA PREPROCEDURE EVALUATION
"                                                                                                             2024  Pre-operative evaluation for Procedure(s) (LRB):  EGD (ESOPHAGOGASTRODUODENOSCOPY) (N/A)  COLONOSCOPY (N/A)    Estela Vázquez is a 58 y.o. female with chronic abdominal pain, hx of bigeminy, and remote hx of PE    Past Medical History:   Diagnosis Date    Arrhythmia     bigeminy & trigeminy     followed cardiologist    Pulmonary embolism     Bilateral     Patient Active Problem List   Diagnosis    Trochanteric bursitis of left hip    Chronic right-sided low back pain without sciatica    Radiculitis of left cervical region       Review of patient's allergies indicates:   Allergen Reactions    Morphine Itching       No current facility-administered medications on file prior to encounter.     Current Outpatient Medications on File Prior to Encounter   Medication Sig Dispense Refill    biotin 5 mg Tab Take 5 mg by mouth.      dicyclomine (BENTYL) 10 MG capsule Take 10 mg by mouth every 6 (six) hours as needed.      estradioL (ESTRACE) 0.01 % (0.1 mg/gram) vaginal cream estradiol 0.01% (0.1 mg/gram) vaginal cream      fluticasone propionate (FLONASE) 50 mcg/actuation nasal spray fluticasone propionate 50 mcg/actuation nasal spray,suspension      losartan (COZAAR) 25 MG tablet Take 25 mg by mouth every evening.       meloxicam (MOBIC) 15 MG tablet Take 1 tablet (15 mg total) by mouth once daily. 30 tablet 3    omega-3 fatty acids/fish oil (FISH OIL-OMEGA-3 FATTY ACIDS) 300-1,000 mg capsule Take 2 g by mouth.      rosuvastatin (CRESTOR) 5 MG tablet Take 5 mg by mouth once daily.      TURMERIC ORAL Take 1,000 mg by mouth.         Past Surgical History:   Procedure Laterality Date    APPENDECTOMY       SECTION N/A     HYSTERECTOMY Bilateral     LAPAROSCOPY W/ MINI-LAPAROTOMY Right     SHOULDER ARTHROSCOPY         CBC: No results for input(s): "WBC", "HGB", "HCT", "PLT" in the last 72 " "hours.    CMP: No results for input(s): "NA", "K", "CL", "CO2", "BUN", "CREATININE", "GLU", "MG", "PHOS", "CALCIUM", "ALBUMIN", "PROT", "ALKPHOS", "ALT", "AST", "BILITOT" in the last 72 hours.    COAGS  No results for input(s): "PT", "INR", "PROTIME", "APTT" in the last 72 hours.    BP Readings from Last 3 Encounters:   24 112/60   02/15/24 137/76   24 118/71       Diagnostic Studies:      [facility] Echo:  SUMMARY:   1. Frequent ectopy.   2. Normal left ventricular systolic function. LVEF of > 55%.   3. Eccentric left ventricular hypertrophy.   4. Abnormal left ventricular strain (-15.6 %).   5. LV diastolic function is mildly abnormal (Grade I).   6. Mild mitral valve regurgitation.   7. Normal right ventricular systolic function.   8. Normal central venous pressure.     Electronically signed by Mirna Ye MD on 2023 at 3:44:54 PM           Pre-op Assessment    I have reviewed the Patient Summary Reports.     I have reviewed the Nursing Notes. I have reviewed the NPO Status.      Review of Systems  Hematology/Oncology:  Hematology Normal   Oncology Normal                                   EENT/Dental:  EENT/Dental Normal           Cardiovascular:         Dysrhythmias (frequent ectopy noted on last TTE report)                                    Pulmonary:  Pulmonary Normal                       Renal/:  Renal/ Normal                 Hepatic/GI:        Chronic abdominal pain          Musculoskeletal:  Arthritis          Spine Disorders: cervical            Neurological:  Neurology Normal                                      Endocrine:  Endocrine Normal            Psych:  Psychiatric Normal                    Physical Exam  General: Well nourished, Alert, Cooperative and Oriented    Airway:  Mallampati: II   Mouth Opening: Normal  Neck ROM: Normal ROM        Anesthesia Plan  Type of Anesthesia, risks & benefits discussed:    Anesthesia Type: Gen Natural Airway  Intra-op Monitoring Plan: " Standard ASA Monitors  Post Op Pain Control Plan: IV/PO Opioids PRN  Induction:  IV  Informed Consent: Informed consent signed with the Patient and all parties understand the risks and agree with anesthesia plan.  All questions answered.   ASA Score: 3  Day of Surgery Review of History & Physical: H&P Update referred to the surgeon/provider.    Ready For Surgery From Anesthesia Perspective.     .

## 2024-04-25 NOTE — [employer]ATION PATIENT INSTRUCTIONS
Discharge Summary/Instructions after an Endoscopic Procedure  Patient Name: Estela Velazco  Patient MRN: [id number]  Patient YOB: 1965 Thursday, April 25, 2024  Chucho Bennett MD  Dear patient,  As a result of recent federal legislation (The Federal Cures Act), you may   receive lab or pathology results from your procedure in your [facility]   account before your physician is able to contact you. Your physician or   their representative will relay the results to you with their   recommendations at their soonest availability.  Thank you,  RESTRICTIONS:  During your procedure today, you received medications for sedation.  These   medications may affect your judgment, balance and coordination.  Therefore,   for 24 hours, you have the following restrictions:   - DO NOT drive a car, operate machinery, make legal/financial decisions,   sign important papers or drink alcohol.    ACTIVITY:  Today: no heavy lifting, straining or running due to procedural   sedation/anesthesia.  The following day: return to full activity including work.  DIET:  Eat and drink normally unless instructed otherwise.     TREATMENT FOR COMMON SIDE EFFECTS:  - Mild abdominal pain, nausea, belching, bloating or excessive gas:  rest,   eat lightly and use a heating pad.  - Sore Throat: treat with throat lozenges and/or gargle with warm salt   water.  - Because air was used during the procedure, expelling large amounts of air   from your rectum or belching is normal.  - If a bowel prep was taken, you may not have a bowel movement for 1-3 days.    This is normal.  SYMPTOMS TO WATCH FOR AND REPORT TO YOUR PHYSICIAN:  1. Abdominal pain or bloating, other than gas cramps.  2. Chest pain.  3. Back pain.  4. Signs of infection such as: chills or fever occurring within 24 hours   after the procedure.  5. Rectal bleeding, which would show as bright red, maroon, or black stools.   (A tablespoon of blood from the rectum is not serious, especially  if   hemorrhoids are present.)  6. Vomiting.  7. Weakness or dizziness.  GO DIRECTLY TO THE NEAREST EMERGENCY ROOM IF YOU HAVE ANY OF THE FOLLOWING:      Difficulty breathing              Chills and/or fever over 101 F   Persistent vomiting and/or vomiting blood   Severe abdominal pain   Severe chest pain   Black, tarry stools   Bleeding- more than one tablespoon   Any other symptom or condition that you feel may need urgent attention  Your doctor recommends these additional instructions:  If any biopsies were taken, your doctors clinic will contact you in 1 to 2   weeks with any results.  - Discharge patient to home.   - Patient has a contact number available for emergencies.  The signs and   symptoms of potential delayed complications were discussed with the   patient.  Return to normal activities tomorrow.  Written discharge   instructions were provided to the patient.   - Resume previous diet.   - Continue present medications.   - Repeat colonoscopy in 10 years for screening purposes.   - Return to GI clinic at appointment to be scheduled.  For questions, problems or results please call your physician - Chucho Bennett MD at Work:  [phone].  [facility], EMERGENCY ROOM PHONE NUMBER: [phone]  IF A COMPLICATION OR EMERGENCY SITUATION ARISES AND YOU ARE UNABLE TO REACH   YOUR PHYSICIAN - GO DIRECTLY TO THE EMERGENCY ROOM.  Chucho Bennett MD  4/25/2024 2:17:20 PM  This report has been verified and signed electronically.  Dear patient,  As a result of recent federal legislation (The Federal Cures Act), you may   receive lab or pathology results from your procedure in your [facility]   account before your physician is able to contact you. Your physician or   their representative will relay the results to you with their   recommendations at their soonest availability.  Thank you,  [employer]ATION

## 2024-04-25 NOTE — PROGRESS NOTES
Discharge instructions reviewed w/pt and , verbalized understanding. PT in NADN>No complaints at his time. Tolerated liquids w/ no issues. To be d/c'd home w/ family.

## 2024-04-25 NOTE — ANESTHESIA POSTPROCEDURE EVALUATION
Anesthesia Post Evaluation    Patient: Estela Vázquez    Procedure(s) Performed: Procedure(s) (LRB):  EGD (ESOPHAGOGASTRODUODENOSCOPY) (N/A)  COLONOSCOPY (N/A)    Final Anesthesia Type: general      Patient location during evaluation: PACU  Patient participation: Yes- Able to Participate  Level of consciousness: awake and alert  Post-procedure vital signs: reviewed and stable  Pain management: adequate  Airway patency: patent    PONV status at discharge: No PONV  Anesthetic complications: no      Cardiovascular status: blood pressure returned to baseline  Respiratory status: unassisted  Hydration status: euvolemic  Follow-up not needed.              Vitals Value Taken Time   /62 04/25/24 1500   Temp 36.5 °C (97.7 °F) 04/25/24 1410   Pulse 60 04/25/24 1500   Resp 12 04/25/24 1500   SpO2 97 % 04/25/24 1500         No case tracking events are documented in the log.      Pain/Nik Score: Nik Score: 9 (4/25/2024  2:26 PM)

## 2024-05-03 LAB
FINAL PATHOLOGIC DIAGNOSIS: NORMAL
GROSS: NORMAL
Lab: NORMAL

## 2024-05-08 ENCOUNTER — PATIENT MESSAGE (OUTPATIENT)
Dept: GASTROENTEROLOGY | Facility: CLINIC | Age: 59
End: 2024-05-08
Payer: COMMERCIAL

## 2024-05-09 ENCOUNTER — OFFICE VISIT (OUTPATIENT)
Dept: SURGERY | Facility: CLINIC | Age: 59
End: 2024-05-09
Attending: SPECIALIST
Payer: COMMERCIAL

## 2024-05-09 ENCOUNTER — PATIENT MESSAGE (OUTPATIENT)
Dept: SURGERY | Facility: CLINIC | Age: 59
End: 2024-05-09

## 2024-05-09 VITALS
HEART RATE: 68 BPM | HEIGHT: 67 IN | WEIGHT: 184 LBS | OXYGEN SATURATION: 98 % | BODY MASS INDEX: 28.88 KG/M2 | SYSTOLIC BLOOD PRESSURE: 134 MMHG | DIASTOLIC BLOOD PRESSURE: 77 MMHG

## 2024-05-09 DIAGNOSIS — R10.11 RIGHT UPPER QUADRANT ABDOMINAL PAIN: ICD-10-CM

## 2024-05-09 DIAGNOSIS — R10.11 RUQ PAIN: Primary | ICD-10-CM

## 2024-05-09 DIAGNOSIS — R10.31 RIGHT LOWER QUADRANT ABDOMINAL PAIN: ICD-10-CM

## 2024-05-09 PROCEDURE — 1159F MED LIST DOCD IN RCRD: CPT | Mod: CPTII,S$GLB,, | Performed by: SPECIALIST

## 2024-05-09 PROCEDURE — 99999 PR PBB SHADOW E&M-EST. PATIENT-LVL IV: CPT | Mod: PBBFAC,,, | Performed by: SPECIALIST

## 2024-05-09 PROCEDURE — 3075F SYST BP GE 130 - 139MM HG: CPT | Mod: CPTII,S$GLB,, | Performed by: SPECIALIST

## 2024-05-09 PROCEDURE — 99204 OFFICE O/P NEW MOD 45 MIN: CPT | Mod: S$GLB,,, | Performed by: SPECIALIST

## 2024-05-09 PROCEDURE — 3078F DIAST BP <80 MM HG: CPT | Mod: CPTII,S$GLB,, | Performed by: SPECIALIST

## 2024-05-09 PROCEDURE — 4010F ACE/ARB THERAPY RXD/TAKEN: CPT | Mod: CPTII,S$GLB,, | Performed by: SPECIALIST

## 2024-05-09 PROCEDURE — 3008F BODY MASS INDEX DOCD: CPT | Mod: CPTII,S$GLB,, | Performed by: SPECIALIST

## 2024-05-09 PROCEDURE — 1160F RVW MEDS BY RX/DR IN RCRD: CPT | Mod: CPTII,S$GLB,, | Performed by: SPECIALIST

## 2024-05-09 NOTE — PROGRESS NOTES
58-year-old female with complaint of right-sided abdominal pain   Pain was longstanding however became significantly worse after appendectomy for ruptured appendix.  Patient has had a past history of endometriosis with enterolysis of adhesions due to endometriosis.  No hematuria, dysuria, history of urinary calculi    Allergy   Morphine    Social history   Negative for tobacco use , 1 pack year past  Social alcohol          Past medical history   Bilateral pulmonary embolus and DVT post hysterectomy 2002  Endometriosis   Diverticulitis/diverticulosis  Hypertension   Hyperlipidemia   Sciatica, low back syndrome    Medications   Crestor   Bentyl   Losartan   Estradiol       Subjective   Pain associated with nausea, vomiting infrequent.  Occasional diarrhea.  No unplanned weight loss   No fever, chills, jaundice  Pain initiates in right lower quadrant and subsequently moves through right upper quadrant and subcostal region      Past surgical/procedure history   section  Multiple procedures for endometriosis  Status post total abdominal hysterectomy and bilateral salpingo oophorectomy.  Appendectomy  EGD, colonoscopy 2024 at [facility] unremarkable    PE  WD/WF female NAD  HEENT-anicteric,  Abdomen-soft, no guarding, no rebound, no masses, tenderness to deep palpation right side of abdomen, no distention.  In multiple incisions healed    Imaging   CT scan of abdomen and pelvis with IV and oral contrast on 2024 notable for stone versus polyp fundus of gallbladder      Impression/plan   Chronic right-sided abdominal pain, possible biliary colic.  We will check ultrasound, HIDA scan with [facility]

## 2024-05-15 ENCOUNTER — HOSPITAL ENCOUNTER (OUTPATIENT)
Dept: RADIOLOGY | Facility: OTHER | Age: 59
Discharge: HOME | End: 2024-05-15
Attending: SPECIALIST
Payer: COMMERCIAL

## 2024-05-15 ENCOUNTER — PATIENT MESSAGE (OUTPATIENT)
Dept: SURGERY | Facility: CLINIC | Age: 59
End: 2024-05-15
Payer: COMMERCIAL

## 2024-05-15 DIAGNOSIS — R10.11 RUQ PAIN: ICD-10-CM

## 2024-05-15 PROCEDURE — 78226 HEPATOBILIARY SYSTEM IMAGING: CPT | Mod: TC

## 2024-05-15 PROCEDURE — 76705 ECHO EXAM OF ABDOMEN: CPT | Mod: 26,,, | Performed by: RADIOLOGY

## 2024-05-15 PROCEDURE — 78226 HEPATOBILIARY SYSTEM IMAGING: CPT | Mod: 26,,, | Performed by: RADIOLOGY

## 2024-05-15 PROCEDURE — 76705 ECHO EXAM OF ABDOMEN: CPT | Mod: TC

## 2024-05-15 PROCEDURE — A9537 TC99M MEBROFENIN: HCPCS | Performed by: SPECIALIST

## 2024-05-15 RX ORDER — KIT FOR THE PREPARATION OF TECHNETIUM TC 99M MEBROFENIN 45 MG/10ML
8 INJECTION, POWDER, LYOPHILIZED, FOR SOLUTION INTRAVENOUS
Status: COMPLETED | OUTPATIENT
Start: 2024-05-15 | End: 2024-05-15

## 2024-05-15 RX ADMIN — KIT FOR THE PREPARATION OF TECHNETIUM TC 99M MEBROFENIN 5.3 MILLICURIE: 45 INJECTION, POWDER, LYOPHILIZED, FOR SOLUTION INTRAVENOUS at 01:05

## 2024-05-17 ENCOUNTER — TELEPHONE (OUTPATIENT)
Dept: SURGERY | Facility: CLINIC | Age: 59
End: 2024-05-17
Payer: COMMERCIAL

## 2024-05-30 ENCOUNTER — OFFICE VISIT (OUTPATIENT)
Dept: SURGERY | Facility: CLINIC | Age: 59
End: 2024-05-30
Attending: SPECIALIST
Payer: COMMERCIAL

## 2024-05-30 VITALS
DIASTOLIC BLOOD PRESSURE: 74 MMHG | SYSTOLIC BLOOD PRESSURE: 113 MMHG | HEIGHT: 67 IN | WEIGHT: 183 LBS | HEART RATE: 68 BPM | BODY MASS INDEX: 28.72 KG/M2 | OXYGEN SATURATION: 97 %

## 2024-05-30 DIAGNOSIS — K82.4 GALLBLADDER POLYP: Primary | ICD-10-CM

## 2024-05-30 PROCEDURE — 3078F DIAST BP <80 MM HG: CPT | Mod: CPTII,S$GLB,, | Performed by: SPECIALIST

## 2024-05-30 PROCEDURE — 3008F BODY MASS INDEX DOCD: CPT | Mod: CPTII,S$GLB,, | Performed by: SPECIALIST

## 2024-05-30 PROCEDURE — 3074F SYST BP LT 130 MM HG: CPT | Mod: CPTII,S$GLB,, | Performed by: SPECIALIST

## 2024-05-30 PROCEDURE — 4010F ACE/ARB THERAPY RXD/TAKEN: CPT | Mod: CPTII,S$GLB,, | Performed by: SPECIALIST

## 2024-05-30 PROCEDURE — 1160F RVW MEDS BY RX/DR IN RCRD: CPT | Mod: CPTII,S$GLB,, | Performed by: SPECIALIST

## 2024-05-30 PROCEDURE — 1159F MED LIST DOCD IN RCRD: CPT | Mod: CPTII,S$GLB,, | Performed by: SPECIALIST

## 2024-05-30 PROCEDURE — 99999 PR PBB SHADOW E&M-EST. PATIENT-LVL IV: CPT | Mod: PBBFAC,,, | Performed by: SPECIALIST

## 2024-05-30 PROCEDURE — 99213 OFFICE O/P EST LOW 20 MIN: CPT | Mod: S$GLB,,, | Performed by: SPECIALIST

## 2024-05-30 NOTE — H&P (VIEW-ONLY)
History & Physical    Subjective     History of Present Illness:  Patient is a 58 y.o. female presents with  history of right upper quadrant pain radiating to back.  Patient is status post workup by GI Medicine including colonoscopy and EGD which were normal.  Ultrasound and CT scan show adherent stone versus polyp.  HIDA scan within normal limits.  Patient for cholecystectomy. Multiple previous procedures for endometriosis. Previous ruptured appendix.    Chief Complaint   Patient presents with    Gall Bladder Problem       Review of patient's allergies indicates:   Allergen Reactions    Morphine Itching       Current Outpatient Medications   Medication Sig Dispense Refill    biotin 5 mg Tab Take 5 mg by mouth.      dicyclomine (BENTYL) 10 MG capsule Take 10 mg by mouth every 6 (six) hours as needed.      estradioL (ESTRACE) 0.01 % (0.1 mg/gram) vaginal cream estradiol 0.01% (0.1 mg/gram) vaginal cream      fluticasone propionate (FLONASE) 50 mcg/actuation nasal spray fluticasone propionate 50 mcg/actuation nasal spray,suspension      losartan (COZAAR) 25 MG tablet Take 25 mg by mouth every evening.       meloxicam (MOBIC) 15 MG tablet Take 1 tablet (15 mg total) by mouth once daily. 30 tablet 3    omega-3 fatty acids/fish oil (FISH OIL-OMEGA-3 FATTY ACIDS) 300-1,000 mg capsule Take 2 g by mouth.      ondansetron (ZOFRAN-ODT) 4 MG TbDL Take 1 tablet (4 mg total) by mouth 2 (two) times daily. 12 tablet 0    oxyCODONE-acetaminophen (PERCOCET) 5-325 mg per tablet Take 1 tablet by mouth every 6 (six) hours as needed for Pain. 12 tablet 0    rosuvastatin (CRESTOR) 5 MG tablet Take 5 mg by mouth once daily.      TURMERIC ORAL Take 1,000 mg by mouth.       No current facility-administered medications for this visit.       Past Medical History:   Diagnosis Date    Arrhythmia     bigeminy & trigeminy     followed cardiologist    Pulmonary embolism     Bilateral     Past Surgical History:   Procedure Laterality Date     "APPENDECTOMY       SECTION N/A     COLONOSCOPY N/A 2024    Procedure: COLONOSCOPY;  Surgeon: Chucho Bennett MD;  Location: [facility] (2ND FLR);  Service: Endoscopy;  Laterality: N/A;    ESOPHAGOGASTRODUODENOSCOPY N/A 2024    Procedure: EGD (ESOPHAGOGASTRODUODENOSCOPY);  Surgeon: Chucho Bennett MD;  [facility]  Service: Endoscopy;  Laterality: N/A;  referred by igor Rhodes scheduling concerns scheduled on 2nd floor due to only availability. suprep instructions sent to pt portal.  -precall complete-MS    HYSTERECTOMY Bilateral     LAPAROSCOPY W/ MINI-LAPAROTOMY Right     SHOULDER ARTHROSCOPY       Family History   Problem Relation Name Age of Onset    Esophageal cancer Mother      Colon cancer Neg Hx       Social History     Tobacco Use    Smoking status: Never    Smokeless tobacco: Never   Substance Use Topics    Alcohol use: Yes     Comment: socially    Drug use: Never        Review of Systems:  Review of Systems   Constitutional: Negative.  Negative for fatigue and fever.   HENT: Negative.  Negative for sore throat and trouble swallowing.    Eyes: Negative.    Respiratory: Negative.     Cardiovascular: Negative.  Negative for chest pain.   Gastrointestinal: Negative.    Genitourinary: Negative.    Musculoskeletal: Negative.    Skin: Negative.    Neurological: Negative.    Psychiatric/Behavioral: Negative.            Objective     Vital Signs (Most Recent)  Pulse: 68 (24 1355)  BP: 113/74 (24 1355)  SpO2: 97 % (24 1355)  5' 7" (1.702 m)  83 kg (183 lb)     Physical Exam:  Physical Exam  Constitutional:       Appearance: She is well-developed.   HENT:      Head: Normocephalic and atraumatic.      Right Ear: External ear normal.      Left Ear: External ear normal.   Eyes:      Pupils: Pupils are equal, round, and reactive to light.   Cardiovascular:      Rate and Rhythm: Normal rate and regular rhythm.      Heart sounds: Normal heart sounds. "   Pulmonary:      Breath sounds: Normal breath sounds.   Abdominal:      General: Bowel sounds are normal.      Palpations: Abdomen is soft. There is no mass.      Tenderness: There is no abdominal tenderness. There is no guarding or rebound.      Hernia: No hernia is present.   Musculoskeletal:         General: Normal range of motion.      Cervical back: Neck supple.   Skin:     General: Skin is warm.   Neurological:      Mental Status: She is alert and oriented to person, place, and time.     Laboratory   Assessment and Plan    Symptomatic gallbladder disease.  Consents reviewed in detail with patient and signed

## 2024-05-30 NOTE — PROGRESS NOTES
History & Physical    Subjective     History of Present Illness:  Patient is a 58 y.o. female presents with  history of right upper quadrant pain radiating to back.  Patient is status post workup by GI Medicine including colonoscopy and EGD which were normal.  Ultrasound and CT scan show adherent stone versus polyp.  HIDA scan within normal limits.  Patient for cholecystectomy. Multiple previous procedures for endometriosis. Previous ruptured appendix.    Chief Complaint   Patient presents with    Gall Bladder Problem       Review of patient's allergies indicates:   Allergen Reactions    Morphine Itching       Current Outpatient Medications   Medication Sig Dispense Refill    biotin 5 mg Tab Take 5 mg by mouth.      dicyclomine (BENTYL) 10 MG capsule Take 10 mg by mouth every 6 (six) hours as needed.      estradioL (ESTRACE) 0.01 % (0.1 mg/gram) vaginal cream estradiol 0.01% (0.1 mg/gram) vaginal cream      fluticasone propionate (FLONASE) 50 mcg/actuation nasal spray fluticasone propionate 50 mcg/actuation nasal spray,suspension      losartan (COZAAR) 25 MG tablet Take 25 mg by mouth every evening.       meloxicam (MOBIC) 15 MG tablet Take 1 tablet (15 mg total) by mouth once daily. 30 tablet 3    omega-3 fatty acids/fish oil (FISH OIL-OMEGA-3 FATTY ACIDS) 300-1,000 mg capsule Take 2 g by mouth.      ondansetron (ZOFRAN-ODT) 4 MG TbDL Take 1 tablet (4 mg total) by mouth 2 (two) times daily. 12 tablet 0    oxyCODONE-acetaminophen (PERCOCET) 5-325 mg per tablet Take 1 tablet by mouth every 6 (six) hours as needed for Pain. 12 tablet 0    rosuvastatin (CRESTOR) 5 MG tablet Take 5 mg by mouth once daily.      TURMERIC ORAL Take 1,000 mg by mouth.       No current facility-administered medications for this visit.       Past Medical History:   Diagnosis Date    Arrhythmia     bigeminy & trigeminy     followed cardiologist    Pulmonary embolism     Bilateral     Past Surgical History:   Procedure Laterality Date     "APPENDECTOMY       SECTION N/A     COLONOSCOPY N/A 2024    Procedure: COLONOSCOPY;  Surgeon: Chucho Bennett MD;  Location: [facility] (2ND FLR);  Service: Endoscopy;  Laterality: N/A;    ESOPHAGOGASTRODUODENOSCOPY N/A 2024    Procedure: EGD (ESOPHAGOGASTRODUODENOSCOPY);  Surgeon: Chucho Bennett MD;  [facility]  Service: Endoscopy;  Laterality: N/A;  referred by igor Rhodes scheduling concerns scheduled on 2nd floor due to only availability. suprep instructions sent to pt portal.  -precall complete-MS    HYSTERECTOMY Bilateral     LAPAROSCOPY W/ MINI-LAPAROTOMY Right     SHOULDER ARTHROSCOPY       Family History   Problem Relation Name Age of Onset    Esophageal cancer Mother      Colon cancer Neg Hx       Social History     Tobacco Use    Smoking status: Never    Smokeless tobacco: Never   Substance Use Topics    Alcohol use: Yes     Comment: socially    Drug use: Never        Review of Systems:  Review of Systems   Constitutional: Negative.  Negative for fatigue and fever.   HENT: Negative.  Negative for sore throat and trouble swallowing.    Eyes: Negative.    Respiratory: Negative.     Cardiovascular: Negative.  Negative for chest pain.   Gastrointestinal: Negative.    Genitourinary: Negative.    Musculoskeletal: Negative.    Skin: Negative.    Neurological: Negative.    Psychiatric/Behavioral: Negative.            Objective     Vital Signs (Most Recent)  Pulse: 68 (24 1355)  BP: 113/74 (24 1355)  SpO2: 97 % (24 1355)  5' 7" (1.702 m)  83 kg (183 lb)     Physical Exam:  Physical Exam  Constitutional:       Appearance: She is well-developed.   HENT:      Head: Normocephalic and atraumatic.      Right Ear: External ear normal.      Left Ear: External ear normal.   Eyes:      Pupils: Pupils are equal, round, and reactive to light.   Cardiovascular:      Rate and Rhythm: Normal rate and regular rhythm.      Heart sounds: Normal heart sounds. "   Pulmonary:      Breath sounds: Normal breath sounds.   Abdominal:      General: Bowel sounds are normal.      Palpations: Abdomen is soft. There is no mass.      Tenderness: There is no abdominal tenderness. There is no guarding or rebound.      Hernia: No hernia is present.   Musculoskeletal:         General: Normal range of motion.      Cervical back: Neck supple.   Skin:     General: Skin is warm.   Neurological:      Mental Status: She is alert and oriented to person, place, and time.     Laboratory   Assessment and Plan    Symptomatic gallbladder disease.  Consents reviewed in detail with patient and signed

## 2024-05-31 ENCOUNTER — PATIENT MESSAGE (OUTPATIENT)
Dept: PREADMISSION TESTING | Facility: OTHER | Age: 59
End: 2024-05-31
Payer: COMMERCIAL

## 2024-06-05 ENCOUNTER — ANESTHESIA EVENT (OUTPATIENT)
Dept: SURGERY | Facility: OTHER | Age: 59
End: 2024-06-05
Payer: COMMERCIAL

## 2024-06-05 ENCOUNTER — HOSPITAL ENCOUNTER (OUTPATIENT)
Dept: PREADMISSION TESTING | Facility: OTHER | Age: 59
Discharge: HOME | End: 2024-06-05
Attending: SPECIALIST
Payer: COMMERCIAL

## 2024-06-05 VITALS
SYSTOLIC BLOOD PRESSURE: 119 MMHG | TEMPERATURE: 97 F | DIASTOLIC BLOOD PRESSURE: 72 MMHG | BODY MASS INDEX: 28.72 KG/M2 | RESPIRATION RATE: 16 BRPM | OXYGEN SATURATION: 97 % | HEIGHT: 67 IN | WEIGHT: 183 LBS | HEART RATE: 59 BPM

## 2024-06-05 DIAGNOSIS — Z01.818 PREOP TESTING: Primary | ICD-10-CM

## 2024-06-05 LAB
OHS QRS DURATION: 92 MS
OHS QTC CALCULATION: 411 MS

## 2024-06-05 PROCEDURE — 93005 ELECTROCARDIOGRAM TRACING: CPT

## 2024-06-05 PROCEDURE — 93010 ELECTROCARDIOGRAM REPORT: CPT | Mod: ,,, | Performed by: INTERNAL MEDICINE

## 2024-06-05 RX ORDER — EPINEPHRINE 0.22MG
100 AEROSOL WITH ADAPTER (ML) INHALATION DAILY
COMMUNITY

## 2024-06-05 RX ORDER — SODIUM CHLORIDE, SODIUM LACTATE, POTASSIUM CHLORIDE, CALCIUM CHLORIDE 600; 310; 30; 20 MG/100ML; MG/100ML; MG/100ML; MG/100ML
INJECTION, SOLUTION INTRAVENOUS CONTINUOUS
Status: CANCELLED | OUTPATIENT
Start: 2024-06-05

## 2024-06-05 RX ORDER — MULTIVITAMIN
1 TABLET ORAL DAILY
COMMUNITY

## 2024-06-05 RX ORDER — NIACINAMIDE 500 MG
500 TABLET ORAL DAILY
COMMUNITY

## 2024-06-05 RX ORDER — ZINC OXIDE 13 %
CREAM (GRAM) TOPICAL DAILY
COMMUNITY

## 2024-06-05 RX ORDER — ACETAMINOPHEN 500 MG
1000 TABLET ORAL
Status: CANCELLED | OUTPATIENT
Start: 2024-06-05 | End: 2024-06-05

## 2024-06-05 NOTE — ANESTHESIA PREPROCEDURE EVALUATION
06/05/2024  Estela Vázquez is a 58 y.o., female.      Pre-op Assessment    I have reviewed the Patient Summary Reports.     I have reviewed the Nursing Notes. I have reviewed the NPO Status.   I have reviewed the Medications.     Review of Systems  Anesthesia Hx:  No problems with previous Anesthesia             Denies Family Hx of Anesthesia complications.    Denies Personal Hx of Anesthesia complications.                    Social:  Non-Smoker       Hematology/Oncology:  Hematology Normal   Oncology Normal                                   EENT/Dental:  EENT/Dental Normal           Cardiovascular:  Exercise tolerance: good       Dysrhythmias           Dysrhythmia history    Pulmonary embolism-no meds. 25 years ago                         Pulmonary:  Pulmonary Normal                       Renal/:  Renal/ Normal                 Musculoskeletal:  Musculoskeletal Normal                Neurological:  Neurology Normal                                      Endocrine:  Endocrine Normal            Dermatological:  Skin Normal    Psych:  Psychiatric Normal                    Physical Exam  General: Well nourished, Cooperative, Oriented and Alert    Airway:  Mallampati: II / II  Mouth Opening: Normal  TM Distance: Normal  Neck ROM: Normal ROM    Dental:  Intact        Anesthesia Plan  Type of Anesthesia, risks & benefits discussed:    Anesthesia Type: Gen ETT  Intra-op Monitoring Plan: Standard ASA Monitors  Post Op Pain Control Plan: multimodal analgesia and peripheral nerve block  Induction:  IV  Airway Plan: Video and Direct  Informed Consent: Informed consent signed with the Patient and all parties understand the risks and agree with anesthesia plan.  All questions answered.   ASA Score: 2  Day of Surgery Review of History & Physical: H&P Update referred to the surgeon/provider.  Anesthesia Plan Notes: Labs  in Epic.  EKGG today for baseline in light of h/o rhythm probs in past    Ready For Surgery From Anesthesia Perspective.     .

## 2024-06-05 NOTE — DISCHARGE INSTRUCTIONS
Information to Prepare you for your Surgery    PRE-ADMIT TESTING   [address] [facility]       Your surgery has been scheduled at [facility]. We are pleased to have the opportunity to serve you. For Further Information please call [phone].    On the day of surgery please report to the Information Desk on the 1st floor.    CONTACT YOUR PHYSICIAN'S OFFICE THE DAY PRIOR TO YOUR SURGERY TO OBTAIN YOUR ARRIVAL TIME.     The evening before surgery do not eat anything after 9 p.m. ( this includes hard candy, chewing gum and mints).  You may only have GATORADE, POWERADE AND WATER  from 9 p.m. until you leave your home.   DO NOT DRINK ANY LIQUIDS ON THE WAY TO THE HOSPITAL.      Why does your anesthesiologist allow you to drink Gatorade/Powerade before surgery?  Gatorade/Powerade helps to increase your comfort before surgery and to decrease your nausea after surgery.   The carbohydrates in Gatorade/Powerade help reduce your body's stress response to surgery.  If you are a diabetic-drink only water prior to surgery.    Outpatient Surgery- May allow 2 adults (18 and older)/ Support Persons (1 being the designated ) for all surgical/procedural patients. A breastfeeding mother will be allowed her infant and 2 adult Support Persons. No one under the age of 18 will be allowed in the building.      SPECIAL MEDICATION INSTRUCTIONS: TAKE medications checked off by the Anesthesiologist on your Medication List.    Surgery Patients:  If you take ASPIRIN - Your PHYSICIAN/SURGEON will need to inform you IF/OR when you need to stop taking aspirin prior to your surgery.     Starting the week prior to surgery, do not take any medications containing IBUPROFEN or NSAIDS (Advil, Aleve, BC, Goody's, Ketorolac, Meloxicam, Mobic, Motrin, Naproxen, Toradol, etc).  If you are not sure if you should take a medicine please call your surgeon's office.  You may take Tylenol.    Do Not Wear any make-up  (especially eye make-up) to surgery. Please remove any false eyelashes or eyelash extensions. If you arrive the day of surgery with makeup/eyelashes on you will be required to remove prior to surgery. (There is a risk of corneal abrasions if eye makeup/eyelash extensions are not removed)    Leave all valuables at home.   Do Not wear any jewelry or watches, including any metal in body piercings. Jewelry must be removed prior to coming to the hospital.  There is a possibility that rings that are unable to be removed may be cut off if they are on the surgical extremity.    Please remove all hair extensions, wigs, clips and any other metal accessories/ ornaments from your hair.  These items may pose a flammable/fire risk in Surgery and must be removed.    Do not shave your surgical area at least 5 days prior to your surgery. The surgical prep will be performed at the hospital according to Infection Control regulations.    Contact Lens must be removed before surgery. Either do not wear the contact lens or bring a case and solution for storage.  Please bring a container for eyeglasses or dentures as required.  Bring any paperwork your physician has provided, such as consent forms,  history and physicals, doctor's orders, etc.   Bring comfortable clothes that are loose fitting to wear upon discharge. Take into consideration the type of surgery being performed.  Maintain your diet as advised per your physician the day prior to surgery.    Adequate rest the night before surgery is advised.   Park in the Parking lot behind the hospital or in the [facility] across the street from the parking lot. Parking is complimentary.  If you will be discharged the same day as your procedure, please arrange for a responsible adult to drive you home or to accompany you if traveling by taxi.   YOU WILL NOT BE PERMITTED TO DRIVE OR TO LEAVE THE HOSPITAL ALONE AFTER SURGERY.   If you are being discharged the same day, it is  strongly recommended that you arrange for someone to remain with you for the first 24 hrs following your surgery.    The Surgeon will speak to your family/visitor after your surgery regarding the outcome of your surgery and post op care.  The Surgeon may speak to you after your surgery, but there is a possibility you may not remember the details.  Please check with your family members regarding the conversation with the Surgeon.    We strongly recommend whoever is bringing you home be present for discharge instructions.  This will ensure a thorough understanding for your post op home care.              Bathing Instructions with Hibiclens  Shower the evening before and morning of your procedure with Chlorhexidine (Hibiclens)    Do not use Chlorhexidine on your face or genitals. Do not get in your eyes.  Wash your face with water and your regular face wash/soap  Use your regular shampoo  Apply Chlorhexidine (Hibiclens) directly on your skin or on a wet washcloth and wash gently. When showering: Move away from the shower stream when applying Chlorhexidine (Hibiclens) to avoid rinsing off too soon.  Rinse thoroughly with warm water  Do not dilute Chlorhexidine (Hibiclens)   Dry off as usual, do not use any deodorant, powder, body lotions, perfume, after shave or cologne.

## 2024-06-06 ENCOUNTER — ANESTHESIA (OUTPATIENT)
Dept: SURGERY | Facility: OTHER | Age: 59
End: 2024-06-06
Payer: COMMERCIAL

## 2024-06-06 ENCOUNTER — HOSPITAL ENCOUNTER (OUTPATIENT)
Facility: OTHER | Age: 59
Discharge: HOME | End: 2024-06-10
Attending: SPECIALIST | Admitting: SPECIALIST
Payer: COMMERCIAL

## 2024-06-06 DIAGNOSIS — R07.9 CHEST PAIN: ICD-10-CM

## 2024-06-06 DIAGNOSIS — K82.9 GALLBLADDER ATTACK: Primary | ICD-10-CM

## 2024-06-06 DIAGNOSIS — R79.89 TROPONIN LEVEL ELEVATED: ICD-10-CM

## 2024-06-06 PROCEDURE — 63600175 PHARM REV CODE 636 W HCPCS: Performed by: ANESTHESIOLOGY

## 2024-06-06 PROCEDURE — 88341 IMHCHEM/IMCYTCHM EA ADD ANTB: CPT | Performed by: PATHOLOGY

## 2024-06-06 PROCEDURE — 36000708 HC OR TIME LEV III 1ST 15 MIN: Performed by: SPECIALIST

## 2024-06-06 PROCEDURE — 88341 IMHCHEM/IMCYTCHM EA ADD ANTB: CPT | Mod: 26,,, | Performed by: PATHOLOGY

## 2024-06-06 PROCEDURE — 25000003 PHARM REV CODE 250: Performed by: ANESTHESIOLOGY

## 2024-06-06 PROCEDURE — 71000039 HC RECOVERY, EACH ADD'L HOUR: Performed by: SPECIALIST

## 2024-06-06 PROCEDURE — 36000709 HC OR TIME LEV III EA ADD 15 MIN: Performed by: SPECIALIST

## 2024-06-06 PROCEDURE — 37000008 HC ANESTHESIA 1ST 15 MINUTES: Performed by: SPECIALIST

## 2024-06-06 PROCEDURE — 25000003 PHARM REV CODE 250: Performed by: SPECIALIST

## 2024-06-06 PROCEDURE — 64486 TAP BLOCK UNIL BY INJECTION: CPT | Mod: 59 | Performed by: ANESTHESIOLOGY

## 2024-06-06 PROCEDURE — 63600175 PHARM REV CODE 636 W HCPCS: Performed by: STUDENT IN AN ORGANIZED HEALTH CARE EDUCATION/TRAINING PROGRAM

## 2024-06-06 PROCEDURE — 47562 LAPAROSCOPIC CHOLECYSTECTOMY: CPT | Mod: ,,, | Performed by: SPECIALIST

## 2024-06-06 PROCEDURE — 88342 IMHCHEM/IMCYTCHM 1ST ANTB: CPT | Mod: 26,,, | Performed by: PATHOLOGY

## 2024-06-06 PROCEDURE — 88304 TISSUE EXAM BY PATHOLOGIST: CPT | Mod: 26,,, | Performed by: PATHOLOGY

## 2024-06-06 PROCEDURE — D9220A PRA ANESTHESIA: Mod: CRNA,,, | Performed by: STUDENT IN AN ORGANIZED HEALTH CARE EDUCATION/TRAINING PROGRAM

## 2024-06-06 PROCEDURE — 94761 N-INVAS EAR/PLS OXIMETRY MLT: CPT

## 2024-06-06 PROCEDURE — 37000009 HC ANESTHESIA EA ADD 15 MINS: Performed by: SPECIALIST

## 2024-06-06 PROCEDURE — C9290 INJ, BUPIVACAINE LIPOSOME: HCPCS | Performed by: ANESTHESIOLOGY

## 2024-06-06 PROCEDURE — 88304 TISSUE EXAM BY PATHOLOGIST: CPT | Performed by: PATHOLOGY

## 2024-06-06 PROCEDURE — 63600175 PHARM REV CODE 636 W HCPCS: Mod: JZ,JG | Performed by: ANESTHESIOLOGY

## 2024-06-06 PROCEDURE — 25000003 PHARM REV CODE 250: Performed by: STUDENT IN AN ORGANIZED HEALTH CARE EDUCATION/TRAINING PROGRAM

## 2024-06-06 PROCEDURE — 71000033 HC RECOVERY, INTIAL HOUR: Performed by: SPECIALIST

## 2024-06-06 PROCEDURE — C1729 CATH, DRAINAGE: HCPCS | Performed by: SPECIALIST

## 2024-06-06 PROCEDURE — 88342 IMHCHEM/IMCYTCHM 1ST ANTB: CPT | Performed by: PATHOLOGY

## 2024-06-06 PROCEDURE — 27201423 OPTIME MED/SURG SUP & DEVICES STERILE SUPPLY: Performed by: SPECIALIST

## 2024-06-06 PROCEDURE — D9220A PRA ANESTHESIA: Mod: ANES,,, | Performed by: ANESTHESIOLOGY

## 2024-06-06 RX ORDER — DICYCLOMINE HYDROCHLORIDE 10 MG/1
10 CAPSULE ORAL 4 TIMES DAILY
Status: COMPLETED | OUTPATIENT
Start: 2024-06-06 | End: 2024-06-09

## 2024-06-06 RX ORDER — HYDROMORPHONE HYDROCHLORIDE 1 MG/ML
0.5 INJECTION, SOLUTION INTRAMUSCULAR; INTRAVENOUS; SUBCUTANEOUS
Status: DISCONTINUED | OUTPATIENT
Start: 2024-06-06 | End: 2024-06-10 | Stop reason: HOSPADM

## 2024-06-06 RX ORDER — CEFAZOLIN SODIUM 1 G/3ML
INJECTION, POWDER, FOR SOLUTION INTRAMUSCULAR; INTRAVENOUS
Status: DISCONTINUED | OUTPATIENT
Start: 2024-06-06 | End: 2024-06-06

## 2024-06-06 RX ORDER — LOSARTAN POTASSIUM 25 MG/1
25 TABLET ORAL NIGHTLY
Status: DISCONTINUED | OUTPATIENT
Start: 2024-06-06 | End: 2024-06-10 | Stop reason: HOSPADM

## 2024-06-06 RX ORDER — ROCURONIUM BROMIDE 10 MG/ML
INJECTION, SOLUTION INTRAVENOUS
Status: DISCONTINUED | OUTPATIENT
Start: 2024-06-06 | End: 2024-06-06

## 2024-06-06 RX ORDER — ACETAMINOPHEN 500 MG
1000 TABLET ORAL
Status: COMPLETED | OUTPATIENT
Start: 2024-06-06 | End: 2024-06-06

## 2024-06-06 RX ORDER — SODIUM CHLORIDE 0.9 % (FLUSH) 0.9 %
3 SYRINGE (ML) INJECTION
Status: DISCONTINUED | OUTPATIENT
Start: 2024-06-06 | End: 2024-06-06 | Stop reason: HOSPADM

## 2024-06-06 RX ORDER — MIDAZOLAM HYDROCHLORIDE 1 MG/ML
INJECTION INTRAMUSCULAR; INTRAVENOUS
Status: DISCONTINUED | OUTPATIENT
Start: 2024-06-06 | End: 2024-06-06

## 2024-06-06 RX ORDER — ACETAMINOPHEN 325 MG/1
650 TABLET ORAL EVERY 4 HOURS PRN
Status: DISCONTINUED | OUTPATIENT
Start: 2024-06-06 | End: 2024-06-10 | Stop reason: HOSPADM

## 2024-06-06 RX ORDER — ONDANSETRON 4 MG/1
4 TABLET, ORALLY DISINTEGRATING ORAL 2 TIMES DAILY
Status: DISCONTINUED | OUTPATIENT
Start: 2024-06-06 | End: 2024-06-10 | Stop reason: HOSPADM

## 2024-06-06 RX ORDER — ONDANSETRON 8 MG/1
8 TABLET, ORALLY DISINTEGRATING ORAL EVERY 8 HOURS PRN
Status: DISCONTINUED | OUTPATIENT
Start: 2024-06-06 | End: 2024-06-10 | Stop reason: HOSPADM

## 2024-06-06 RX ORDER — EPHEDRINE SULFATE 50 MG/ML
INJECTION, SOLUTION INTRAVENOUS
Status: DISCONTINUED | OUTPATIENT
Start: 2024-06-06 | End: 2024-06-06

## 2024-06-06 RX ORDER — DEXAMETHASONE SODIUM PHOSPHATE 4 MG/ML
INJECTION, SOLUTION INTRA-ARTICULAR; INTRALESIONAL; INTRAMUSCULAR; INTRAVENOUS; SOFT TISSUE
Status: DISCONTINUED | OUTPATIENT
Start: 2024-06-06 | End: 2024-06-06

## 2024-06-06 RX ORDER — ENOXAPARIN SODIUM 100 MG/ML
40 INJECTION SUBCUTANEOUS EVERY 24 HOURS
Status: DISCONTINUED | OUTPATIENT
Start: 2024-06-07 | End: 2024-06-10 | Stop reason: HOSPADM

## 2024-06-06 RX ORDER — SODIUM CHLORIDE, SODIUM LACTATE, POTASSIUM CHLORIDE, CALCIUM CHLORIDE 600; 310; 30; 20 MG/100ML; MG/100ML; MG/100ML; MG/100ML
INJECTION, SOLUTION INTRAVENOUS CONTINUOUS
Status: DISCONTINUED | OUTPATIENT
Start: 2024-06-06 | End: 2024-06-06

## 2024-06-06 RX ORDER — DIPHENHYDRAMINE HYDROCHLORIDE 50 MG/ML
12.5 INJECTION INTRAMUSCULAR; INTRAVENOUS EVERY 4 HOURS PRN
Status: DISCONTINUED | OUTPATIENT
Start: 2024-06-06 | End: 2024-06-10 | Stop reason: HOSPADM

## 2024-06-06 RX ORDER — LIDOCAINE HYDROCHLORIDE 10 MG/ML
1 INJECTION, SOLUTION EPIDURAL; INFILTRATION; INTRACAUDAL; PERINEURAL ONCE
Status: DISCONTINUED | OUTPATIENT
Start: 2024-06-06 | End: 2024-06-06

## 2024-06-06 RX ORDER — HYDROMORPHONE HYDROCHLORIDE 2 MG/ML
0.4 INJECTION, SOLUTION INTRAMUSCULAR; INTRAVENOUS; SUBCUTANEOUS EVERY 5 MIN PRN
Status: DISCONTINUED | OUTPATIENT
Start: 2024-06-06 | End: 2024-06-06 | Stop reason: HOSPADM

## 2024-06-06 RX ORDER — OXYCODONE HYDROCHLORIDE 5 MG/1
5 TABLET ORAL
Status: DISCONTINUED | OUTPATIENT
Start: 2024-06-06 | End: 2024-06-06 | Stop reason: HOSPADM

## 2024-06-06 RX ORDER — CEFAZOLIN SODIUM 1 G/3ML
2 INJECTION, POWDER, FOR SOLUTION INTRAMUSCULAR; INTRAVENOUS
Status: DISCONTINUED | OUTPATIENT
Start: 2024-06-06 | End: 2024-06-06 | Stop reason: HOSPADM

## 2024-06-06 RX ORDER — PROPOFOL 10 MG/ML
VIAL (ML) INTRAVENOUS
Status: DISCONTINUED | OUTPATIENT
Start: 2024-06-06 | End: 2024-06-06

## 2024-06-06 RX ORDER — OXYCODONE HYDROCHLORIDE 10 MG/1
10 TABLET ORAL EVERY 4 HOURS PRN
Status: DISCONTINUED | OUTPATIENT
Start: 2024-06-06 | End: 2024-06-10 | Stop reason: HOSPADM

## 2024-06-06 RX ORDER — BUPIVACAINE HYDROCHLORIDE AND EPINEPHRINE 2.5; 5 MG/ML; UG/ML
INJECTION, SOLUTION EPIDURAL; INFILTRATION; INTRACAUDAL; PERINEURAL
Status: DISCONTINUED | OUTPATIENT
Start: 2024-06-06 | End: 2024-06-06 | Stop reason: HOSPADM

## 2024-06-06 RX ORDER — SODIUM CHLORIDE 9 MG/ML
INJECTION, SOLUTION INTRAVENOUS CONTINUOUS
Status: DISCONTINUED | OUTPATIENT
Start: 2024-06-06 | End: 2024-06-09

## 2024-06-06 RX ORDER — MEPERIDINE HYDROCHLORIDE 25 MG/ML
12.5 INJECTION INTRAMUSCULAR; INTRAVENOUS; SUBCUTANEOUS ONCE AS NEEDED
Status: DISCONTINUED | OUTPATIENT
Start: 2024-06-06 | End: 2024-06-06 | Stop reason: HOSPADM

## 2024-06-06 RX ORDER — BUPIVACAINE HYDROCHLORIDE 2.5 MG/ML
INJECTION, SOLUTION EPIDURAL; INFILTRATION; INTRACAUDAL
Status: COMPLETED | OUTPATIENT
Start: 2024-06-06 | End: 2024-06-06

## 2024-06-06 RX ORDER — MELOXICAM 7.5 MG/1
15 TABLET ORAL DAILY
Status: DISCONTINUED | OUTPATIENT
Start: 2024-06-06 | End: 2024-06-10 | Stop reason: HOSPADM

## 2024-06-06 RX ORDER — SODIUM CHLORIDE 9 MG/ML
INJECTION, SOLUTION INTRAVENOUS CONTINUOUS
Status: DISCONTINUED | OUTPATIENT
Start: 2024-06-06 | End: 2024-06-06

## 2024-06-06 RX ORDER — FENTANYL CITRATE 50 UG/ML
INJECTION, SOLUTION INTRAMUSCULAR; INTRAVENOUS
Status: DISCONTINUED | OUTPATIENT
Start: 2024-06-06 | End: 2024-06-06

## 2024-06-06 RX ORDER — ONDANSETRON HYDROCHLORIDE 2 MG/ML
INJECTION, SOLUTION INTRAVENOUS
Status: DISCONTINUED | OUTPATIENT
Start: 2024-06-06 | End: 2024-06-06

## 2024-06-06 RX ORDER — PROCHLORPERAZINE EDISYLATE 5 MG/ML
5 INJECTION INTRAMUSCULAR; INTRAVENOUS EVERY 30 MIN PRN
Status: DISCONTINUED | OUTPATIENT
Start: 2024-06-06 | End: 2024-06-06 | Stop reason: HOSPADM

## 2024-06-06 RX ORDER — LIDOCAINE HYDROCHLORIDE 20 MG/ML
INJECTION INTRAVENOUS
Status: DISCONTINUED | OUTPATIENT
Start: 2024-06-06 | End: 2024-06-06

## 2024-06-06 RX ADMIN — MIDAZOLAM HYDROCHLORIDE 2 MG: 1 INJECTION, SOLUTION INTRAMUSCULAR; INTRAVENOUS at 06:06

## 2024-06-06 RX ADMIN — DEXAMETHASONE SODIUM PHOSPHATE 8 MG: 4 INJECTION, SOLUTION INTRAMUSCULAR; INTRAVENOUS at 07:06

## 2024-06-06 RX ADMIN — PROCHLORPERAZINE EDISYLATE 2.5 MG: 5 INJECTION INTRAMUSCULAR; INTRAVENOUS at 11:06

## 2024-06-06 RX ADMIN — BUPIVACAINE 20 ML: 13.3 INJECTION, SUSPENSION, LIPOSOMAL INFILTRATION at 06:06

## 2024-06-06 RX ADMIN — PROPOFOL 160 MG: 10 INJECTION, EMULSION INTRAVENOUS at 07:06

## 2024-06-06 RX ADMIN — HYDROMORPHONE HYDROCHLORIDE 0.4 MG: 2 INJECTION, SOLUTION INTRAMUSCULAR; INTRAVENOUS; SUBCUTANEOUS at 10:06

## 2024-06-06 RX ADMIN — HYDROMORPHONE HYDROCHLORIDE 0.4 MG: 2 INJECTION, SOLUTION INTRAMUSCULAR; INTRAVENOUS; SUBCUTANEOUS at 09:06

## 2024-06-06 RX ADMIN — OXYCODONE HYDROCHLORIDE 5 MG: 5 TABLET ORAL at 09:06

## 2024-06-06 RX ADMIN — OXYCODONE HYDROCHLORIDE 10 MG: 10 TABLET ORAL at 09:06

## 2024-06-06 RX ADMIN — SUGAMMADEX 200 MG: 100 INJECTION, SOLUTION INTRAVENOUS at 08:06

## 2024-06-06 RX ADMIN — SODIUM CHLORIDE, SODIUM LACTATE, POTASSIUM CHLORIDE, AND CALCIUM CHLORIDE: 600; 310; 30; 20 INJECTION, SOLUTION INTRAVENOUS at 06:06

## 2024-06-06 RX ADMIN — LIDOCAINE HYDROCHLORIDE 75 MG: 20 INJECTION, SOLUTION INTRAVENOUS at 07:06

## 2024-06-06 RX ADMIN — OXYCODONE HYDROCHLORIDE 5 MG: 5 TABLET ORAL at 01:06

## 2024-06-06 RX ADMIN — SODIUM CHLORIDE, SODIUM LACTATE, POTASSIUM CHLORIDE, AND CALCIUM CHLORIDE: 600; 310; 30; 20 INJECTION, SOLUTION INTRAVENOUS at 08:06

## 2024-06-06 RX ADMIN — EPHEDRINE SULFATE 5 MG: 50 INJECTION INTRAVENOUS at 08:06

## 2024-06-06 RX ADMIN — GLYCOPYRROLATE 0.2 MG: 0.2 INJECTION, SOLUTION INTRAMUSCULAR; INTRAVITREAL at 07:06

## 2024-06-06 RX ADMIN — OXYCODONE HYDROCHLORIDE 10 MG: 10 TABLET ORAL at 05:06

## 2024-06-06 RX ADMIN — BUPIVACAINE HYDROCHLORIDE 40 ML: 2.5 INJECTION, SOLUTION EPIDURAL; INFILTRATION; INTRACAUDAL; PERINEURAL at 06:06

## 2024-06-06 RX ADMIN — FENTANYL CITRATE 100 MCG: 50 INJECTION, SOLUTION INTRAMUSCULAR; INTRAVENOUS at 07:06

## 2024-06-06 RX ADMIN — DICYCLOMINE HYDROCHLORIDE 10 MG: 10 CAPSULE ORAL at 04:06

## 2024-06-06 RX ADMIN — DICYCLOMINE HYDROCHLORIDE 10 MG: 10 CAPSULE ORAL at 09:06

## 2024-06-06 RX ADMIN — ONDANSETRON 4 MG: 4 TABLET, ORALLY DISINTEGRATING ORAL at 09:06

## 2024-06-06 RX ADMIN — ACETAMINOPHEN 1000 MG: 500 TABLET ORAL at 06:06

## 2024-06-06 RX ADMIN — CEFAZOLIN 2 G: 330 INJECTION, POWDER, FOR SOLUTION INTRAMUSCULAR; INTRAVENOUS at 07:06

## 2024-06-06 RX ADMIN — MELOXICAM 15 MG: 7.5 TABLET ORAL at 04:06

## 2024-06-06 RX ADMIN — SODIUM CHLORIDE: 9 INJECTION, SOLUTION INTRAVENOUS at 03:06

## 2024-06-06 RX ADMIN — ROCURONIUM BROMIDE 50 MG: 10 SOLUTION INTRAVENOUS at 07:06

## 2024-06-06 RX ADMIN — EPHEDRINE SULFATE 10 MG: 50 INJECTION INTRAVENOUS at 07:06

## 2024-06-06 RX ADMIN — ONDANSETRON HYDROCHLORIDE 4 MG: 2 INJECTION INTRAMUSCULAR; INTRAVENOUS at 08:06

## 2024-06-06 RX ADMIN — EPHEDRINE SULFATE 5 MG: 50 INJECTION INTRAVENOUS at 07:06

## 2024-06-06 NOTE — OR NURSING
VSS on RA. Pain is tolerable per pt. X5 lap sites with steri strips, CDI. PIV CDI. Meets PACU discharge criteria. Ready for transfer to [address] [place] [address]. Report given to JENNIFER Cristobal. Family notified.

## 2024-06-06 NOTE — ANESTHESIA POSTPROCEDURE EVALUATION
Anesthesia Post Evaluation    Patient: Estela Vázquez    Procedure(s) Performed: Procedure(s) (LRB):  CHOLECYSTECTOMY, LAPAROSCOPIC (N/A)  LYSIS, ADHESIONS, LAPAROSCOPIC (N/A)    Final Anesthesia Type: general      Patient location during evaluation: PACU  Patient participation: Yes- Able to Participate  Level of consciousness: awake and alert  Post-procedure vital signs: reviewed and stable  Pain management: adequate  Airway patency: patent    PONV status at discharge: No PONV  Anesthetic complications: no      Cardiovascular status: blood pressure returned to baseline  Respiratory status: unassisted  Hydration status: euvolemic  Follow-up not needed.              Vitals Value Taken Time   /54 06/06/24 1101   Temp 36.8 °C (98.3 °F) 06/06/24 0853   Pulse 63 06/06/24 1109   Resp 16 06/06/24 1045   SpO2 97 % 06/06/24 1109   Vitals shown include unfiled device data.      No case tracking events are documented in the log.      Pain/Nik Score: Pain Rating Prior to Med Admin: 8 (6/6/2024 10:04 AM)  Pain Rating Post Med Admin: 6 (6/6/2024 10:14 AM)  Nik Score: 9 (6/6/2024 10:23 AM)

## 2024-06-06 NOTE — TRANSFER OF CARE
Anesthesia Transfer of Care Note    Patient: Estela Vázquez    Procedure(s) Performed: Procedure(s) (LRB):  CHOLECYSTECTOMY, LAPAROSCOPIC (N/A)  LYSIS, ADHESIONS, LAPAROSCOPIC (N/A)    Patient location: PACU    Anesthesia Type: general    Transport from OR: Transported from OR on 2-3 L/min O2 by NC with adequate spontaneous ventilation    Post pain: adequate analgesia    Post assessment: no apparent anesthetic complications and tolerated procedure well    Post vital signs: stable    Level of consciousness: awake and alert    Nausea/Vomiting: no nausea/vomiting    Complications: none    Transfer of care protocol was followed      Last vitals: Visit Vitals  /81 (BP Location: Left arm, Patient Position: Lying)   Pulse (!) 48   Temp 36.8 °C (98.3 °F) (Skin)   Resp 18   SpO2 96%   Breastfeeding No

## 2024-06-06 NOTE — ANESTHESIA PROCEDURE NOTES
Intubation    Date/Time: 6/6/2024 7:19 AM    Performed by: Edward Wright CRNA  Authorized by: Flynn Arias MD    Intubation:     Induction:  Intravenous    Intubated:  Postinduction    Mask Ventilation:  Easy mask    Attempts:  1    Attempted By:  Student    Method of Intubation:  Video laryngoscopy    Blade:  Clark 3    Laryngeal View Grade: Grade I - full view of cords      Difficult Airway Encountered?: No      Complications:  None    Airway Device:  Oral endotracheal tube    Airway Device Size:  7.5    Style/Cuff Inflation:  Cuffed (inflated to minimal occlusive pressure)    Tube secured:  22    Secured at:  The lips    Placement Verified By:  Capnometry    Complicating Factors:  None    Findings Post-Intubation:  BS equal bilateral and atraumatic/condition of teeth unchanged

## 2024-06-06 NOTE — ANESTHESIA POSTPROCEDURE EVALUATION
Anesthesia Post Evaluation    Patient: Estela Vázquez    Procedure(s) Performed: Procedure(s) (LRB):  CHOLECYSTECTOMY, LAPAROSCOPIC (N/A)  LYSIS, ADHESIONS, LAPAROSCOPIC (N/A)    [facility] Anesthesia Post Op Evaluation      Vitals Value Taken Time   /54 06/06/24 1101   Temp 36.8 °C (98.3 °F) 06/06/24 0853   Pulse 63 06/06/24 1109   Resp 16 06/06/24 1045   SpO2 97 % 06/06/24 1109   Vitals shown include unfiled device data.      No case tracking events are documented in the log.      Pain/Nik Score: Pain Rating Prior to Med Admin: 8 (6/6/2024 10:04 AM)  Pain Rating Post Med Admin: 6 (6/6/2024 10:14 AM)  Nik Score: 9 (6/6/2024 10:23 AM)

## 2024-06-06 NOTE — ANESTHESIA PROCEDURE NOTES
Peripheral Block    Patient location during procedure: pre-op   Block not for primary anesthetic.  Reason for block: at surgeon's request and post-op pain management   Post-op Pain Location: abdominal wall pain    End time: 6/6/2024 6:30 AM    Staffing  Authorizing Provider: Devonte Person MD  Performing Provider: Devonte Person MD    Staffing  Performed by: Devonte Person MD  Authorized by: Devonte Person MD    Preanesthetic Checklist  Completed: patient identified, IV checked, site marked, risks and benefits discussed, surgical consent, monitors and equipment checked, pre-op evaluation and timeout performed  Peripheral Block  Patient position: supine  Prep: ChloraPrep  Patient monitoring: cardiac monitor, heart rate, continuous pulse ox, continuous capnometry and frequent blood pressure checks  Block type: TAP  Laterality: right  Injection technique: single shot  Needle  Needle type: Stimuplex   Needle gauge: 21 G  Needle length: 4 in  Needle localization: ultrasound guidance   -ultrasound image captured on disc.  Assessment  Injection assessment: negative aspiration, negative parasthesia and local visualized surrounding nerve  Paresthesia pain: none  Heart rate change: no  Slow fractionated injection: yes  Pain Tolerance: comfortable throughout block and no complaints  Medications:    Medications: bupivacaine (pf) (MARCAINE) injection 0.25% - Perineural   40 mL - 6/6/2024 6:30:00 AM    Additional Notes  VSS.  [facility] RN monitoring vitals throughout procedure.  Patient tolerated procedure well. Right subcostal done also

## 2024-06-07 PROCEDURE — 25000003 PHARM REV CODE 250: Performed by: SPECIALIST

## 2024-06-07 PROCEDURE — 96372 THER/PROPH/DIAG INJ SC/IM: CPT | Performed by: SPECIALIST

## 2024-06-07 PROCEDURE — 94761 N-INVAS EAR/PLS OXIMETRY MLT: CPT

## 2024-06-07 PROCEDURE — G0378 HOSPITAL OBSERVATION PER HR: HCPCS

## 2024-06-07 PROCEDURE — 63600175 PHARM REV CODE 636 W HCPCS: Performed by: SPECIALIST

## 2024-06-07 RX ORDER — DOCUSATE SODIUM 100 MG/1
100 CAPSULE, LIQUID FILLED ORAL DAILY
Status: DISCONTINUED | OUTPATIENT
Start: 2024-06-07 | End: 2024-06-10 | Stop reason: HOSPADM

## 2024-06-07 RX ORDER — SIMETHICONE 80 MG
1 TABLET,CHEWABLE ORAL 3 TIMES DAILY PRN
Status: DISCONTINUED | OUTPATIENT
Start: 2024-06-07 | End: 2024-06-10 | Stop reason: HOSPADM

## 2024-06-07 RX ADMIN — ENOXAPARIN SODIUM 40 MG: 40 INJECTION SUBCUTANEOUS at 05:06

## 2024-06-07 RX ADMIN — DICYCLOMINE HYDROCHLORIDE 10 MG: 10 CAPSULE ORAL at 01:06

## 2024-06-07 RX ADMIN — OXYCODONE HYDROCHLORIDE 10 MG: 10 TABLET ORAL at 05:06

## 2024-06-07 RX ADMIN — OXYCODONE HYDROCHLORIDE 10 MG: 10 TABLET ORAL at 02:06

## 2024-06-07 RX ADMIN — DIPHENHYDRAMINE HYDROCHLORIDE 12.5 MG: 50 INJECTION, SOLUTION INTRAMUSCULAR; INTRAVENOUS at 09:06

## 2024-06-07 RX ADMIN — SODIUM CHLORIDE: 9 INJECTION, SOLUTION INTRAVENOUS at 06:06

## 2024-06-07 RX ADMIN — OXYCODONE HYDROCHLORIDE 10 MG: 10 TABLET ORAL at 09:06

## 2024-06-07 RX ADMIN — OXYCODONE HYDROCHLORIDE 10 MG: 10 TABLET ORAL at 08:06

## 2024-06-07 RX ADMIN — DOCUSATE SODIUM 100 MG: 100 CAPSULE, LIQUID FILLED ORAL at 08:06

## 2024-06-07 RX ADMIN — OXYCODONE HYDROCHLORIDE 10 MG: 10 TABLET ORAL at 01:06

## 2024-06-07 RX ADMIN — DICYCLOMINE HYDROCHLORIDE 10 MG: 10 CAPSULE ORAL at 08:06

## 2024-06-07 RX ADMIN — DIPHENHYDRAMINE HYDROCHLORIDE 12.5 MG: 50 INJECTION, SOLUTION INTRAMUSCULAR; INTRAVENOUS at 01:06

## 2024-06-07 RX ADMIN — ONDANSETRON 4 MG: 4 TABLET, ORALLY DISINTEGRATING ORAL at 08:06

## 2024-06-07 RX ADMIN — MELOXICAM 15 MG: 7.5 TABLET ORAL at 08:06

## 2024-06-07 RX ADMIN — DICYCLOMINE HYDROCHLORIDE 10 MG: 10 CAPSULE ORAL at 05:06

## 2024-06-07 RX ADMIN — ONDANSETRON 4 MG: 4 TABLET, ORALLY DISINTEGRATING ORAL at 09:06

## 2024-06-07 RX ADMIN — SIMETHICONE 80 MG: 80 TABLET, CHEWABLE ORAL at 08:06

## 2024-06-07 RX ADMIN — HYDROMORPHONE HYDROCHLORIDE 0.5 MG: 0.5 INJECTION, SOLUTION INTRAMUSCULAR; INTRAVENOUS; SUBCUTANEOUS at 07:06

## 2024-06-07 RX ADMIN — ACETAMINOPHEN 650 MG: 325 TABLET, FILM COATED ORAL at 09:06

## 2024-06-07 NOTE — PROGRESS NOTES
Postop day 1.  Status post laparoscopic cholecystectomy and extensive enterolysis of adhesions      Subjective   Tolerating diet although intake poor  Itching post narcotic      PE  Low-grade temp  Intermittent hypotension  Awake and alert  Anicteric  Abdomen-soft, mild distention, incisional tenderness, positive bowel sounds, wounds clean and dry  Extremities-no tenderness      Impression/plan   Surgically stable, no evidence of postop hemorrhage  Gentle IV fluids for blood pressure  Advance diet as tolerated

## 2024-06-07 NOTE — PLAN OF CARE
Problem: Adult Inpatient Plan of Care  Goal: Plan of Care Review  Outcome: Progressing  Goal: Patient-Specific Goal (Individualized)  Outcome: Progressing  Goal: Absence of Hospital-Acquired Illness or Injury  Outcome: Progressing  Goal: Optimal Comfort and Wellbeing  Outcome: Progressing  Goal: Readiness for Transition of Care  Outcome: Progressing     Problem: Wound  Goal: Optimal Coping  Outcome: Progressing  Goal: Optimal Functional Ability  Outcome: Progressing  Goal: Absence of Infection Signs and Symptoms  Outcome: Progressing  Goal: Improved Oral Intake  Outcome: Progressing  Goal: Optimal Pain Control and Function  Outcome: Progressing  Goal: Skin Health and Integrity  Outcome: Progressing  Goal: Optimal Wound Healing  Outcome: Progressing     Problem: Fall Injury Risk  Goal: Absence of Fall and Fall-Related Injury  Outcome: Progressing      [facility]  Brief Operative Note     SUMMARY     Surgery Date: 11/22/2019     Surgeon(s) and Role:     * Junior Mondragon Jr., MD - Primary    Assisting Surgeon: None    Pre-op Diagnosis:  Complete tear of right rotator cuff [M75.121]  Superior labrum anterior-to-posterior (SLAP) tear of right shoulder [S43.431A]    Post-op Diagnosis:  Post-Op Diagnosis Codes:     * Complete tear of right rotator cuff [M75.121]     * Superior labrum anterior-to-posterior (SLAP) tear of right shoulder [S43.431A]    Procedure(s) (LRB):  REPAIR, ROTATOR CUFF, ARTHROSCOPIC (Right)  FIXATION, TENDON (Right)  ARTHROSCOPY, SHOULDER, WITH SLAP REPAIR (Right)    Anesthesia: General    Description of the findings of the procedure: labral repair,  Rot cuff repair,   Patch,   bicepscv teno  Estimated Blood Loss: *20 No values recorded between 11/22/2019 12:00 AM and 11/22/2019 10:19 AM *         Specimens:   Specimen (12h ago, onward)    None          Discharge Note    SUMMARY     Admit Date: 11/22/2019    Discharge Date and Time:  11/22/2019 10:20 AM    Hospital Course (synopsis of major diagnoses, care, treatment, and services provided during the course of the hospital stay): Uneventful Outpatient Surgery     Final Diagnosis: Post-Op Diagnosis Codes:     * Complete tear of right rotator cuff [M75.121]     * Superior labrum anterior-to-posterior (SLAP) tear of right shoulder [S43.431A]    Disposition: Home or Self Care    Follow Up/Patient Instructions:     Medications:  Reconciled Home Medications:   Current Discharge Medication List      START taking these medications    Details   oxyCODONE-acetaminophen (PERCOCET)  mg per tablet Take 1 tablet by mouth every 4 (four) hours as needed for Pain.  Qty: 30 tablet, Refills: 0    Comments: Quantity prescribed more than 7 day supply? No      promethazine (PHENERGAN) 25 MG tablet Take 1 tablet (25 mg total) by mouth every 8 (eight) hours as needed for Nausea.  Qty: 30 tablet,  Refills: 0         CONTINUE these medications which have NOT CHANGED    Details   ALPRAZolam (XANAX) 0.25 MG tablet Take 1 tablet (0.25 mg total) by mouth 3 (three) times daily as needed.  Qty: 90 tablet, Refills: 0    Associated Diagnoses: Stress      ascorbic acid, vitamin C, (VITAMIN C) 500 MG tablet Take 500 mg by mouth once daily.      b complex vitamins capsule Take 1 capsule by mouth once daily.      dapagliflozin (FARXIGA) 10 mg Tab Take 1 tablet by mouth once daily.      hydroCHLOROthiazide (HYDRODIURIL) 12.5 MG Tab TAKE 1 TABLET BY MOUTH ONCE DAILY  Qty: 90 tablet, Refills: 1      JANUVIA 100 mg Tab 100 mg once daily.       !! losartan (COZAAR) 100 MG tablet TAKE 1 TABLET BY MOUTH EVERY DAY  Qty: 30 tablet, Refills: 1      !! losartan (COZAAR) 100 MG tablet TAKE 1 TABLET BY MOUTH EVERY DAY  Qty: 30 tablet, Refills: 1      multivitamin capsule Take 1 capsule by mouth once daily.      rosuvastatin (CRESTOR) 10 MG tablet Take 1 tablet by mouth every evening.       aspirin (ECOTRIN) 81 MG EC tablet Take 1 tablet by mouth once daily.      CONTOUR NEXT STRIPS Strp       glipiZIDE (GLUCOTROL) 5 MG tablet Take 5 mg by mouth once daily.       !! losartan (COZAAR) 100 MG tablet TAKE 1 TABLET BY MOUTH EVERY DAY  Qty: 30 tablet, Refills: 1      metformin (GLUCOPHAGE) 1000 MG tablet TAKE ONE TABLET BY MOUTH TWICE DAILY  Qty: 60 tablet, Refills: 3       !! - Potential duplicate medications found. Please discuss with provider.        Discharge Procedure Orders   SLING ORTHOPEDIC LARGE FOR HOME USE     Diet general     Ice to affected area     Lifting restrictions     No driving, operating heavy equipment or signing legal documents while taking pain medication     Call MD for:  temperature >100.4     Call MD for:  persistent nausea and vomiting     Call MD for:  severe uncontrolled pain     Call MD for:  difficulty breathing, headache or visual disturbances     Call MD for:  redness, tenderness, or signs of infection  (pain, swelling, redness, odor or green/yellow discharge around incision site)     Call MD for:  hives     Call MD for:  persistent dizziness or light-headedness     Call MD for:  extreme fatigue     Remove dressing in 48 hours     Follow-up Information     Junior Mondragon Jr, MD In 2 weeks.    Specialties:  Orthopedic Surgery, Surgery  Contact information:  [address] [address]  [phone]

## 2024-06-07 NOTE — PLAN OF CARE
Problem: Wound  Goal: Optimal Coping  Outcome: Progressing  Goal: Optimal Functional Ability  Outcome: Progressing  Goal: Absence of Infection Signs and Symptoms  Outcome: Progressing  Goal: Improved Oral Intake  Outcome: Progressing  Goal: Optimal Pain Control and Function  Outcome: Progressing  Goal: Skin Health and Integrity  Outcome: Progressing  Goal: Optimal Wound Healing  Outcome: Progressing     Problem: Adult Inpatient Plan of Care  Goal: Plan of Care Review  Outcome: Progressing  Goal: Patient-Specific Goal (Individualized)  Outcome: Progressing  Goal: Absence of Hospital-Acquired Illness or Injury  Outcome: Progressing  Goal: Optimal Comfort and Wellbeing  Outcome: Progressing  Goal: Readiness for Transition of Care  Outcome: Progressing

## 2024-06-08 ENCOUNTER — HOSPITAL ENCOUNTER (OUTPATIENT)
Dept: CARDIOLOGY | Facility: OTHER | Age: 59
Discharge: HOME | End: 2024-06-08
Attending: HOSPITALIST | Admitting: SPECIALIST
Payer: COMMERCIAL

## 2024-06-08 VITALS
HEIGHT: 67 IN | BODY MASS INDEX: 28.72 KG/M2 | HEART RATE: 55 BPM | DIASTOLIC BLOOD PRESSURE: 55 MMHG | WEIGHT: 183 LBS | SYSTOLIC BLOOD PRESSURE: 102 MMHG

## 2024-06-08 PROBLEM — K58.0 IRRITABLE BOWEL SYNDROME WITH DIARRHEA: Status: ACTIVE | Noted: 2024-06-08

## 2024-06-08 PROBLEM — D64.9 NORMOCYTIC ANEMIA: Status: ACTIVE | Noted: 2024-06-08

## 2024-06-08 PROBLEM — I50.32 CHRONIC DIASTOLIC CONGESTIVE HEART FAILURE: Status: ACTIVE | Noted: 2024-06-08

## 2024-06-08 PROBLEM — I49.3 PVC (PREMATURE VENTRICULAR CONTRACTION): Status: ACTIVE | Noted: 2024-06-08

## 2024-06-08 PROBLEM — R07.9 CHEST PAIN: Status: ACTIVE | Noted: 2024-06-08

## 2024-06-08 PROBLEM — Z86.711 HISTORY OF PULMONARY EMBOLISM: Status: ACTIVE | Noted: 2024-06-08

## 2024-06-08 PROBLEM — R79.89 ABNORMAL LFTS: Status: ACTIVE | Noted: 2024-06-08

## 2024-06-08 PROBLEM — E78.5 HYPERLIPIDEMIA: Status: ACTIVE | Noted: 2024-06-08

## 2024-06-08 LAB
ALBUMIN SERPL BCP-MCNC: 3.5 G/DL (ref 3.5–5.2)
ALP SERPL-CCNC: 84 U/L (ref 55–135)
ALT SERPL W/O P-5'-P-CCNC: 123 U/L (ref 10–44)
ANION GAP SERPL CALC-SCNC: 8 MMOL/L (ref 8–16)
ASCENDING AORTA: 4.19 CM
AST SERPL-CCNC: 77 U/L (ref 10–40)
AV INDEX (PROSTH): 0.63
AV MEAN GRADIENT: 11 MMHG
AV PEAK GRADIENT: 20 MMHG
AV VALVE AREA BY VELOCITY RATIO: 2.18 CM²
AV VALVE AREA: 2.52 CM²
AV VELOCITY RATIO: 0.55
BASOPHILS # BLD AUTO: 0.01 K/UL (ref 0–0.2)
BASOPHILS NFR BLD: 0.2 % (ref 0–1.9)
BILIRUB SERPL-MCNC: 0.6 MG/DL (ref 0.1–1)
BNP SERPL-MCNC: 170 PG/ML (ref 0–99)
BSA FOR ECHO PROCEDURE: 1.98 M2
BUN SERPL-MCNC: 11 MG/DL (ref 6–20)
CALCIUM SERPL-MCNC: 9 MG/DL (ref 8.7–10.5)
CHLORIDE SERPL-SCNC: 106 MMOL/L (ref 95–110)
CO2 SERPL-SCNC: 25 MMOL/L (ref 23–29)
CREAT SERPL-MCNC: 0.9 MG/DL (ref 0.5–1.4)
CV ECHO LV RWT: 0.38 CM
D DIMER PPP IA.FEU-MCNC: 0.93 MG/L FEU
DIFFERENTIAL METHOD BLD: ABNORMAL
DOP CALC AO PEAK VEL: 2.22 M/S
DOP CALC AO VTI: 38.5 CM
DOP CALC LVOT AREA: 4 CM2
DOP CALC LVOT DIAMETER: 2.25 CM
DOP CALC LVOT PEAK VEL: 1.22 M/S
DOP CALC LVOT STROKE VOLUME: 96.97 CM3
DOP CALC RVOT PEAK VEL: 1 M/S
DOP CALC RVOT VTI: 19.8 CM
DOP CALCLVOT PEAK VEL VTI: 24.4 CM
E WAVE DECELERATION TIME: 287.55 MSEC
E/A RATIO: 0.75
E/E' RATIO: 15 M/S
ECHO LV POSTERIOR WALL: 1.02 CM (ref 0.6–1.1)
EJECTION FRACTION: 70 %
EOSINOPHIL # BLD AUTO: 0.2 K/UL (ref 0–0.5)
EOSINOPHIL NFR BLD: 3.3 % (ref 0–8)
ERYTHROCYTE [DISTWIDTH] IN BLOOD BY AUTOMATED COUNT: 14.9 % (ref 11.5–14.5)
EST. GFR  (NO RACE VARIABLE): >60 ML/MIN/1.73 M^2
ESTIMATED AVG GLUCOSE: 108 MG/DL (ref 68–131)
FRACTIONAL SHORTENING: 35 % (ref 28–44)
GLUCOSE SERPL-MCNC: 98 MG/DL (ref 70–110)
HBA1C MFR BLD: 5.4 % (ref 4–5.6)
HCT VFR BLD AUTO: 33.9 % (ref 37–48.5)
HGB BLD-MCNC: 10.9 G/DL (ref 12–16)
IMM GRANULOCYTES # BLD AUTO: 0.01 K/UL (ref 0–0.04)
IMM GRANULOCYTES NFR BLD AUTO: 0.2 % (ref 0–0.5)
INR PPP: 1 (ref 0.8–1.2)
INTERVENTRICULAR SEPTUM: 0.96 CM (ref 0.6–1.1)
IVC DIAMETER: 1.22 CM
IVRT: 144.62 MSEC
LA MAJOR: 5.37 CM
LA MINOR: 4.42 CM
LA WIDTH: 4.6 CM
LEFT ATRIUM SIZE: 4.46 CM
LEFT ATRIUM VOLUME INDEX MOD: 36.5 ML/M2
LEFT ATRIUM VOLUME INDEX: 43.4 ML/M2
LEFT ATRIUM VOLUME MOD: 71.2 CM3
LEFT ATRIUM VOLUME: 84.56 CM3
LEFT INTERNAL DIMENSION IN SYSTOLE: 3.47 CM (ref 2.1–4)
LEFT VENTRICLE DIASTOLIC VOLUME INDEX: 69.83 ML/M2
LEFT VENTRICLE DIASTOLIC VOLUME: 136.16 ML
LEFT VENTRICLE MASS INDEX: 102 G/M2
LEFT VENTRICLE SYSTOLIC VOLUME INDEX: 25.5 ML/M2
LEFT VENTRICLE SYSTOLIC VOLUME: 49.79 ML
LEFT VENTRICULAR INTERNAL DIMENSION IN DIASTOLE: 5.31 CM (ref 3.5–6)
LEFT VENTRICULAR MASS: 198.37 G
LV LATERAL E/E' RATIO: 15 M/S
LV SEPTAL E/E' RATIO: 15 M/S
LVOT MG: 3.24 MMHG
LVOT MV: 0.84 CM/S
LYMPHOCYTES # BLD AUTO: 1.9 K/UL (ref 1–4.8)
LYMPHOCYTES NFR BLD: 29.4 % (ref 18–48)
MCH RBC QN AUTO: 27.6 PG (ref 27–31)
MCHC RBC AUTO-ENTMCNC: 32.2 G/DL (ref 32–36)
MCV RBC AUTO: 86 FL (ref 82–98)
MONOCYTES # BLD AUTO: 0.5 K/UL (ref 0.3–1)
MONOCYTES NFR BLD: 7.9 % (ref 4–15)
MV PEAK A VEL: 0.8 M/S
MV PEAK E VEL: 0.6 M/S
MV STENOSIS PRESSURE HALF TIME: 83.39 MS
MV VALVE AREA P 1/2 METHOD: 2.64 CM2
NEUTROPHILS # BLD AUTO: 3.8 K/UL (ref 1.8–7.7)
NEUTROPHILS NFR BLD: 59 % (ref 38–73)
NRBC BLD-RTO: 0 /100 WBC
OHS CV RV/LV RATIO: 0.75 CM
PISA MRMAX VEL: 2.32 M/S
PISA TR MAX VEL: 2.99 M/S
PLATELET # BLD AUTO: 168 K/UL (ref 150–450)
PMV BLD AUTO: 10.2 FL (ref 9.2–12.9)
POTASSIUM SERPL-SCNC: 4 MMOL/L (ref 3.5–5.1)
PROT SERPL-MCNC: 5.8 G/DL (ref 6–8.4)
PROTHROMBIN TIME: 10.9 SEC (ref 9–12.5)
PULM VEIN S/D RATIO: 1.39
PV MEAN GRADIENT: 2 MMHG
PV PEAK D VEL: 0.36 M/S
PV PEAK GRADIENT: 8 MMHG
PV PEAK S VEL: 0.5 M/S
PV PEAK VELOCITY: 1.41 M/S
RA MAJOR: 5.25 CM
RA PRESSURE ESTIMATED: 3 MMHG
RA WIDTH: 5.1 CM
RBC # BLD AUTO: 3.95 M/UL (ref 4–5.4)
RIGHT VENTRICULAR END-DIASTOLIC DIMENSION: 3.96 CM
RV TB RVSP: 6 MMHG
RV TISSUE DOPPLER FREE WALL SYSTOLIC VELOCITY 1 (APICAL 4 CHAMBER VIEW): 17.76 CM/S
SINUS: 3.5 CM
SODIUM SERPL-SCNC: 139 MMOL/L (ref 136–145)
STJ: 3.57 CM
TDI LATERAL: 0.04 M/S
TDI SEPTAL: 0.04 M/S
TDI: 0.04 M/S
TR MAX PG: 36 MMHG
TRICUSPID ANNULAR PLANE SYSTOLIC EXCURSION: 2.3 CM
TROPONIN I SERPL DL<=0.01 NG/ML-MCNC: 0.23 NG/ML (ref 0–0.03)
TROPONIN I SERPL DL<=0.01 NG/ML-MCNC: 0.25 NG/ML (ref 0–0.03)
TROPONIN I SERPL DL<=0.01 NG/ML-MCNC: 0.29 NG/ML (ref 0–0.03)
TROPONIN I SERPL DL<=0.01 NG/ML-MCNC: 0.31 NG/ML (ref 0–0.03)
TROPONIN I SERPL DL<=0.01 NG/ML-MCNC: 0.32 NG/ML (ref 0–0.03)
TV REST PULMONARY ARTERY PRESSURE: 39 MMHG
WBC # BLD AUTO: 6.46 K/UL (ref 3.9–12.7)
Z-SCORE OF LEFT VENTRICULAR DIMENSION IN END DIASTOLE: -0.46
Z-SCORE OF LEFT VENTRICULAR DIMENSION IN END SYSTOLE: 0.12

## 2024-06-08 PROCEDURE — 94761 N-INVAS EAR/PLS OXIMETRY MLT: CPT

## 2024-06-08 PROCEDURE — 93010 ELECTROCARDIOGRAM REPORT: CPT | Mod: 76,,, | Performed by: INTERNAL MEDICINE

## 2024-06-08 PROCEDURE — 84484 ASSAY OF TROPONIN QUANT: CPT | Mod: 91 | Performed by: HOSPITALIST

## 2024-06-08 PROCEDURE — 84484 ASSAY OF TROPONIN QUANT: CPT | Mod: 91 | Performed by: INTERNAL MEDICINE

## 2024-06-08 PROCEDURE — 93005 ELECTROCARDIOGRAM TRACING: CPT

## 2024-06-08 PROCEDURE — 36415 COLL VENOUS BLD VENIPUNCTURE: CPT | Performed by: HOSPITALIST

## 2024-06-08 PROCEDURE — 83880 ASSAY OF NATRIURETIC PEPTIDE: CPT | Performed by: HOSPITALIST

## 2024-06-08 PROCEDURE — G0378 HOSPITAL OBSERVATION PER HR: HCPCS

## 2024-06-08 PROCEDURE — 85379 FIBRIN DEGRADATION QUANT: CPT | Performed by: STUDENT IN AN ORGANIZED HEALTH CARE EDUCATION/TRAINING PROGRAM

## 2024-06-08 PROCEDURE — 25500020 PHARM REV CODE 255: Performed by: SPECIALIST

## 2024-06-08 PROCEDURE — 85025 COMPLETE CBC W/AUTO DIFF WBC: CPT | Performed by: HOSPITALIST

## 2024-06-08 PROCEDURE — 36415 COLL VENOUS BLD VENIPUNCTURE: CPT | Mod: XB | Performed by: STUDENT IN AN ORGANIZED HEALTH CARE EDUCATION/TRAINING PROGRAM

## 2024-06-08 PROCEDURE — 93306 TTE W/DOPPLER COMPLETE: CPT | Mod: 26,,, | Performed by: INTERNAL MEDICINE

## 2024-06-08 PROCEDURE — 25000003 PHARM REV CODE 250: Performed by: SPECIALIST

## 2024-06-08 PROCEDURE — 63600175 PHARM REV CODE 636 W HCPCS: Performed by: SPECIALIST

## 2024-06-08 PROCEDURE — 93010 ELECTROCARDIOGRAM REPORT: CPT | Mod: ,,, | Performed by: INTERNAL MEDICINE

## 2024-06-08 PROCEDURE — 36415 COLL VENOUS BLD VENIPUNCTURE: CPT | Mod: XB | Performed by: INTERNAL MEDICINE

## 2024-06-08 PROCEDURE — 85610 PROTHROMBIN TIME: CPT | Performed by: HOSPITALIST

## 2024-06-08 PROCEDURE — 84484 ASSAY OF TROPONIN QUANT: CPT | Mod: 91 | Performed by: STUDENT IN AN ORGANIZED HEALTH CARE EDUCATION/TRAINING PROGRAM

## 2024-06-08 PROCEDURE — 94799 UNLISTED PULMONARY SVC/PX: CPT

## 2024-06-08 PROCEDURE — 83036 HEMOGLOBIN GLYCOSYLATED A1C: CPT | Performed by: HOSPITALIST

## 2024-06-08 PROCEDURE — 96372 THER/PROPH/DIAG INJ SC/IM: CPT | Performed by: SPECIALIST

## 2024-06-08 PROCEDURE — 93306 TTE W/DOPPLER COMPLETE: CPT

## 2024-06-08 PROCEDURE — 80053 COMPREHEN METABOLIC PANEL: CPT | Performed by: HOSPITALIST

## 2024-06-08 RX ADMIN — IOHEXOL 100 ML: 350 INJECTION, SOLUTION INTRAVENOUS at 02:06

## 2024-06-08 RX ADMIN — OXYCODONE HYDROCHLORIDE 10 MG: 10 TABLET ORAL at 09:06

## 2024-06-08 RX ADMIN — OXYCODONE HYDROCHLORIDE 10 MG: 10 TABLET ORAL at 06:06

## 2024-06-08 RX ADMIN — DICYCLOMINE HYDROCHLORIDE 10 MG: 10 CAPSULE ORAL at 09:06

## 2024-06-08 RX ADMIN — OXYCODONE HYDROCHLORIDE 10 MG: 10 TABLET ORAL at 10:06

## 2024-06-08 RX ADMIN — OXYCODONE HYDROCHLORIDE 10 MG: 10 TABLET ORAL at 01:06

## 2024-06-08 RX ADMIN — ACETAMINOPHEN 650 MG: 325 TABLET, FILM COATED ORAL at 06:06

## 2024-06-08 RX ADMIN — DICYCLOMINE HYDROCHLORIDE 10 MG: 10 CAPSULE ORAL at 04:06

## 2024-06-08 RX ADMIN — OXYCODONE HYDROCHLORIDE 10 MG: 10 TABLET ORAL at 04:06

## 2024-06-08 RX ADMIN — ONDANSETRON 4 MG: 4 TABLET, ORALLY DISINTEGRATING ORAL at 09:06

## 2024-06-08 RX ADMIN — MELOXICAM 15 MG: 7.5 TABLET ORAL at 09:06

## 2024-06-08 RX ADMIN — SODIUM CHLORIDE: 9 INJECTION, SOLUTION INTRAVENOUS at 02:06

## 2024-06-08 RX ADMIN — DOCUSATE SODIUM 100 MG: 100 CAPSULE, LIQUID FILLED ORAL at 09:06

## 2024-06-08 RX ADMIN — SIMETHICONE 80 MG: 80 TABLET, CHEWABLE ORAL at 06:06

## 2024-06-08 RX ADMIN — DIPHENHYDRAMINE HYDROCHLORIDE 12.5 MG: 50 INJECTION, SOLUTION INTRAMUSCULAR; INTRAVENOUS at 10:06

## 2024-06-08 RX ADMIN — DICYCLOMINE HYDROCHLORIDE 10 MG: 10 CAPSULE ORAL at 01:06

## 2024-06-08 RX ADMIN — ENOXAPARIN SODIUM 40 MG: 40 INJECTION SUBCUTANEOUS at 04:06

## 2024-06-08 RX ADMIN — ONDANSETRON 8 MG: 8 TABLET, ORALLY DISINTEGRATING ORAL at 06:06

## 2024-06-08 NOTE — ASSESSMENT & PLAN NOTE
-History noted and she reports it is diarrhea predominant  -Await return of bowel function  -Provide symptomatic support as needed

## 2024-06-08 NOTE — ASSESSMENT & PLAN NOTE
-Developed bilateral chest pain after her surgery on 6/7 and now with only right sided chest pain worse with deep inspiration.  Noted history of PE after surgery 2001 (not on oral anticoagulation now), hyperlipidemia and diastolic chf.  Does not smoke and no history of CAD in either parent.  Denies heart burn symptoms and had normal EGD within the last year.  -CXR is unremarkable  -EKG shows TWI in I, V1-V6 as well as subtle ST depression in V3-V6.  Appears similar to EKG prior to surgery.  -Troponin elevated at 0.311  -Differential includes recurrent pulmonary embolism, CO2 insufflation from her surgery, NSTEMI and GI etiologies.  -Trend troponin  -Check stat echo  -D-dimer ordered, but machine is down and result will be delayed as it is now a send-out.  -Check stat CTA chest  -Consult cardiology.  -Monitor on telemetry

## 2024-06-08 NOTE — ASSESSMENT & PLAN NOTE
-Takes crestor 5mg qhs at home - not on formulary here  -Noting slightly elevated LFTs will hold statin for now

## 2024-06-08 NOTE — PLAN OF CARE
Problem: Adult Inpatient Plan of Care  Goal: Plan of Care Review  Outcome: Progressing  Goal: Absence of Hospital-Acquired Illness or Injury  Outcome: Progressing  Goal: Optimal Comfort and Wellbeing  Outcome: Progressing     Problem: Fall Injury Risk  Goal: Absence of Fall and Fall-Related Injury  Outcome: Progressing     Problem: Pain Acute  Goal: Optimal Pain Control and Function  Outcome: Progressing     Problem: Surgery Nonspecified  Goal: Absence of Bleeding  Outcome: Progressing  Goal: Nausea and Vomiting Relief  Outcome: Progressing  Goal: Effective Oxygenation and Ventilation  Outcome: Progressing   POC reviewed with pt who verbalized understanding. AAOx4. Tmax 100.8.  IVF infusing. Complains of gas pain. Pain treated with PRN meds per MAR. Up independently to bathroom. IS at bedside and encouraged. Resting between care. Remains free of falls and injury. Call light in reach and rounds made for safety.

## 2024-06-08 NOTE — NURSING
10:00 AM  Respiratory notified of EKG.     10:26 AM  EKG placed in chart. Provider made aware of results.     1:03 PM  Echo at bedside.     2:05 PM  ANGE to CT    2:27 PM  Pt returned. Notified respiratory of STAT EKG.     3:37 PM  EKG complete. Remains the same. Cardiology made aware.

## 2024-06-08 NOTE — CONSULTS
[facility] [address] Medicine  Consult Note    Patient Name: Estela Vázquez  MRN: [id number]  Admission Date: 6/6/2024  Hospital Length of Stay: 0 days  Attending Physician: Gumaro Hampton MD  Primary Care Provider: Marie Steinberg MD           Patient information was obtained from patient, past medical records, and ER records.     Consults  Subjective:     Principal Problem: Gallbladder attack    Chief Complaint: No chief complaint on file.       HPI: Ms. Vázquez is a 58 year old woman with history of pulmonary embolism (2001 after abdominal surgery), PVCs, chronic diastolic chf, irritable bowel syndrome (diarrhea predominant), prior diverticulitis and hyperlipidemia who was admitted by Dr. Murphy for planned cholecystectomy.  Her surgery was performed 6/7.  After her surgery she had bilateral chest pain which was felt to be secondary to CO2 insuffusion during her surgery.  The left sided chest pain has completely resolved, but the right sided chest pain persists.  This morning the right chest pain was severe, but presently it is very slight.  She states the pain is non-radiating and is worse with inspiration.  She denies other alleviating and exacerbating factors.  She notes it feels somewhat similar to the pain she had with her prior pulmonary embolism with the exception that her current pain does not radiate to her back.  She denies shortness of breath and palpitations.  She had nausea and chills this morning but no vomiting.  Her abdominal post-surgical pain is as she expected to be and is mild.  She is passing flatus but has had no bowel movements yet.        Past Medical History:   Diagnosis Date    Arrhythmia     bigeminy & trigeminy     followed cardiologist    Pulmonary embolism     Bilateral    Sleep apnea     uses CPAP       Past Surgical History:   Procedure Laterality Date    ANKLE FRACTURE SURGERY Left 2021    Plates & screw placed then removed 1/2024    APPENDECTOMY        SECTION N/A     COLONOSCOPY N/A 2024    Procedure: COLONOSCOPY;  Surgeon: Chucho Bennett MD;  Location: [facility] ENDO (2ND FLR);  Service: Endoscopy;  Laterality: N/A;    ESOPHAGOGASTRODUODENOSCOPY N/A 2024    Procedure: EGD (ESOPHAGOGASTRODUODENOSCOPY);  Surgeon: Chucho Bennett MD;  Location: [facility] ENDO (2ND FLR);  Service: Endoscopy;  Laterality: N/A;  referred by igor Rhodes scheduling concerns scheduled on 2nd floor due to only availability. suprep instructions sent to pt portal.  -precall complete-MS    Hip Scope Left     HYSTERECTOMY Bilateral     with tummy tuck    LAPAROSCOPIC CHOLECYSTECTOMY N/A 2024    Procedure: CHOLECYSTECTOMY, LAPAROSCOPIC;  Surgeon: Gumaro Murphy Jr., MD;  Location: [facility] OR;  Service: General;  Laterality: N/A;    LAPAROSCOPIC LYSIS OF ADHESIONS N/A 2024    Procedure: LYSIS, ADHESIONS, LAPAROSCOPIC;  Surgeon: Gumaro Murphy Jr., MD;  Location: [facility] OR;  Service: General;  Laterality: N/A;    LAPAROSCOPY W/ MINI-LAPAROTOMY Right     SHOULDER ARTHROSCOPY         Review of patient's allergies indicates:   Allergen Reactions    Morphine Itching     Can take all other pain meds       No current facility-administered medications on file prior to encounter.     Current Outpatient Medications on File Prior to Encounter   Medication Sig    rosuvastatin (CRESTOR) 5 MG tablet Take 5 mg by mouth once daily.    biotin 5 mg Tab Take 5 mg by mouth.    dicyclomine (BENTYL) 10 MG capsule Take 10 mg by mouth every 6 (six) hours as needed.    estradioL (ESTRACE) 0.01 % (0.1 mg/gram) vaginal cream estradiol 0.01% (0.1 mg/gram) vaginal cream    fluticasone propionate (FLONASE) 50 mcg/actuation nasal spray fluticasone propionate 50 mcg/actuation nasal spray,suspension    losartan (COZAAR) 25 MG tablet Take 25 mg by mouth every evening.     meloxicam (MOBIC) 15 MG tablet Take 1 tablet (15 mg total) by mouth once daily.    omega-3 fatty acids/fish oil (FISH OIL-OMEGA-3  FATTY ACIDS) 300-1,000 mg capsule Take 2 g by mouth.    ondansetron (ZOFRAN-ODT) 4 MG TbDL Take 1 tablet (4 mg total) by mouth 2 (two) times daily.    oxyCODONE-acetaminophen (PERCOCET) 5-325 mg per tablet Take 1 tablet by mouth every 6 (six) hours as needed for Pain.     Family History       Problem Relation (Age of Onset)    Esophageal cancer Mother          Tobacco Use    Smoking status: Never    Smokeless tobacco: Never   Substance and Sexual Activity    Alcohol use: Yes     Comment: socially    Drug use: Never    Sexual activity: Yes     Partners: Male     Birth control/protection: None     Review of Systems   All other systems reviewed and are negative.    Objective:     Vital Signs (Most Recent):  Temp: 99.1 °F (37.3 °C) (06/08/24 0835)  Pulse: 76 (06/08/24 0835)  Resp: 18 (06/08/24 1033)  BP: 121/60 (06/08/24 0835)  SpO2: (!) 92 % (06/08/24 0845) Vital Signs (24h Range):  Temp:  [97.7 °F (36.5 °C)-100.8 °F (38.2 °C)] 99.1 °F (37.3 °C)  Pulse:  [56-76] 76  Resp:  [14-20] 18  SpO2:  [92 %-98 %] 92 %  BP: ()/(52-60) 121/60     Weight: 83 kg (183 lb)  Body mass index is 28.66 kg/m².     Physical Exam  Vitals and nursing note reviewed.   Constitutional:       General: She is not in acute distress.     Appearance: She is well-developed. She is ill-appearing. She is not toxic-appearing or diaphoretic.   HENT:      Head: Normocephalic and atraumatic.      Right Ear: External ear normal.      Left Ear: External ear normal.      Nose: Nose normal.      Mouth/Throat:      Mouth: Mucous membranes are moist.   Eyes:      Extraocular Movements: Extraocular movements intact.   Cardiovascular:      Rate and Rhythm: Normal rate and regular rhythm.      Heart sounds: Normal heart sounds.   Pulmonary:      Effort: Pulmonary effort is normal. No respiratory distress.      Breath sounds: Normal breath sounds.      Comments: Some faint dry crackles in right base, but overall good air movement without wheezing  Abdominal:       General: Bowel sounds are normal. There is no distension.      Palpations: Abdomen is soft.      Comments: Incisions are clean, dry and intact.  Very slight diffuse tenderness as expected after surgery.   Musculoskeletal:         General: Normal range of motion.      Cervical back: Normal range of motion. No rigidity.      Comments: Some chronic edema at left ankle (due to prior ankle reconstructive surgery)   Skin:     General: Skin is warm and dry.   Neurological:      Mental Status: She is alert and oriented to person, place, and time.      Cranial Nerves: No cranial nerve deficit.      Coordination: Coordination normal.   Psychiatric:         Mood and Affect: Mood normal.         Behavior: Behavior normal.          Significant Labs: All pertinent labs within the past 24 hours have been reviewed.    Significant Imaging: I have reviewed all pertinent imaging results/findings within the past 24 hours.  Assessment/Plan:     * Gallbladder attack  -Admitted by Dr. Murphy for planned cholecystectomy  -Surgery completed 6/7  -Noted fever and soft blood pressure overnight.  No leukocytosis at this time  -Continue incentive spirometry and DVT prophylaxis with lovenox.  -Defer management to primary service    Chest pain  -Developed bilateral chest pain after her surgery on 6/7 and now with only right sided chest pain worse with deep inspiration.  Noted history of PE after surgery 2001 (not on oral anticoagulation now), hyperlipidemia and diastolic chf.  Does not smoke and no history of CAD in either parent.  Denies heart burn symptoms and had normal EGD within the last year.  -CXR is unremarkable  -EKG shows TWI in I, V1-V6 as well as subtle ST depression in V3-V6.  Appears similar to EKG prior to surgery.  -Troponin elevated at 0.311  -Differential includes recurrent pulmonary embolism, CO2 insufflation from her surgery, NSTEMI and GI etiologies.  -Trend troponin  -Check stat echo  -D-dimer ordered, but machine is down  and result will be delayed as it is now a send-out.  -Check stat CTA chest  -Consult cardiology.  -Monitor on telemetry    Chronic diastolic congestive heart failure  -Follows with Dr. Chauhan and I reviewed his last clinic note from 4/19/24  -Appears euvolemic  -BNP only 170 and CXR without pulmonary edema  -Echo 11/30/23 showed EF >55%, grade I diastolic dysfunction and LVH  -Strict ins/outs and daily weights  -Continue home losartan    Normocytic anemia  -Hb prior to surgery was 13.5  -Hb now 10.9  -No evidence of bleeding  -Suspect drop is multifactorial due to expected blood loss from surgery and dilution from IV fluids  -Check iron, ferritin, b12 and folate tomorrow  -Repeat cbc in AM    Abnormal LFTs  -Noted mild elevation of AST and ALT with normal bilirubin  -These were normal prior to surgery  -Suspect secondary to instrumentation and should normalize over coming days  -Hold statin for now  -Repeat cmp in AM    PVC (premature ventricular contraction)  -History noted  -Monitor on telemetry    Hyperlipidemia  -Takes crestor 5mg qhs at home - not on formulary here  -Noting slightly elevated LFTs will hold statin for now    Irritable bowel syndrome with diarrhea  -History noted and she reports it is diarrhea predominant  -Await return of bowel function  -Provide symptomatic support as needed    History of pulmonary embolism  -Had PE after abdominal surgery in 2001 in [place] and was treated with coumadin for 3-6 months      VTE Risk Mitigation (From admission, onward)           Ordered     enoxaparin injection 40 mg  Every 24 hours         06/06/24 1826     Place sequential compression device  Until discontinued         06/06/24 1826                        Thank you for your consult. I will follow-up with patient. Please contact us if you have any additional questions.    Gumaro Hampton MD  Department of Hospital Medicine   [facility] ([address])

## 2024-06-08 NOTE — SUBJECTIVE & OBJECTIVE
Past Medical History:   Diagnosis Date    Arrhythmia     bigeminy & trigeminy     followed cardiologist    Pulmonary embolism     Bilateral    Sleep apnea     uses CPAP       Past Surgical History:   Procedure Laterality Date    ANKLE FRACTURE SURGERY Left     Plates & screw placed then removed 2024    APPENDECTOMY       SECTION N/A     COLONOSCOPY N/A 2024    Procedure: COLONOSCOPY;  Surgeon: Chucho Bennett MD;  Location: [facility] (2ND FLR);  Service: Endoscopy;  Laterality: N/A;    ESOPHAGOGASTRODUODENOSCOPY N/A 2024    Procedure: EGD (ESOPHAGOGASTRODUODENOSCOPY);  Surgeon: Chucho Bennett MD;  Location: [facility] (2ND FLR);  Service: Endoscopy;  Laterality: N/A;  referred by igor Rhodes scheduling concerns scheduled on 2nd floor due to only availability. suprep instructions sent to pt portal.  -precall complete-MS    Hip Scope Left     HYSTERECTOMY Bilateral     with tummy tuck    LAPAROSCOPIC CHOLECYSTECTOMY N/A 2024    Procedure: CHOLECYSTECTOMY, LAPAROSCOPIC;  Surgeon: Gumaro Murphy Jr., MD;  Location: [facility] OR;  Service: General;  Laterality: N/A;    LAPAROSCOPIC LYSIS OF ADHESIONS N/A 2024    Procedure: LYSIS, ADHESIONS, LAPAROSCOPIC;  Surgeon: Gumaro Murphy Jr., MD;  Location: [facility] OR;  Service: General;  Laterality: N/A;    LAPAROSCOPY W/ MINI-LAPAROTOMY Right     SHOULDER ARTHROSCOPY         Review of patient's allergies indicates:   Allergen Reactions    Morphine Itching     Can take all other pain meds       No current facility-administered medications on file prior to encounter.     Current Outpatient Medications on File Prior to Encounter   Medication Sig    rosuvastatin (CRESTOR) 5 MG tablet Take 5 mg by mouth once daily.    biotin 5 mg Tab Take 5 mg by mouth.    dicyclomine (BENTYL) 10 MG capsule Take 10 mg by mouth every 6 (six) hours as needed.    estradioL (ESTRACE) 0.01 % (0.1 mg/gram) vaginal cream estradiol 0.01% (0.1 mg/gram) vaginal cream     fluticasone propionate (FLONASE) 50 mcg/actuation nasal spray fluticasone propionate 50 mcg/actuation nasal spray,suspension    losartan (COZAAR) 25 MG tablet Take 25 mg by mouth every evening.     meloxicam (MOBIC) 15 MG tablet Take 1 tablet (15 mg total) by mouth once daily.    omega-3 fatty acids/fish oil (FISH OIL-OMEGA-3 FATTY ACIDS) 300-1,000 mg capsule Take 2 g by mouth.    ondansetron (ZOFRAN-ODT) 4 MG TbDL Take 1 tablet (4 mg total) by mouth 2 (two) times daily.    oxyCODONE-acetaminophen (PERCOCET) 5-325 mg per tablet Take 1 tablet by mouth every 6 (six) hours as needed for Pain.     Family History       Problem Relation (Age of Onset)    Esophageal cancer Mother          Tobacco Use    Smoking status: Never    Smokeless tobacco: Never   Substance and Sexual Activity    Alcohol use: Yes     Comment: socially    Drug use: Never    Sexual activity: Yes     Partners: Male     Birth control/protection: None     Review of Systems   All other systems reviewed and are negative.    Objective:     Vital Signs (Most Recent):  Temp: 99.1 °F (37.3 °C) (06/08/24 0835)  Pulse: 76 (06/08/24 0835)  Resp: 18 (06/08/24 1033)  BP: 121/60 (06/08/24 0835)  SpO2: (!) 92 % (06/08/24 0845) Vital Signs (24h Range):  Temp:  [97.7 °F (36.5 °C)-100.8 °F (38.2 °C)] 99.1 °F (37.3 °C)  Pulse:  [56-76] 76  Resp:  [14-20] 18  SpO2:  [92 %-98 %] 92 %  BP: ()/(52-60) 121/60     Weight: 83 kg (183 lb)  Body mass index is 28.66 kg/m².     Physical Exam  Vitals and nursing note reviewed.   Constitutional:       General: She is not in acute distress.     Appearance: She is well-developed. She is ill-appearing. She is not toxic-appearing or diaphoretic.   HENT:      Head: Normocephalic and atraumatic.      Right Ear: External ear normal.      Left Ear: External ear normal.      Nose: Nose normal.      Mouth/Throat:      Mouth: Mucous membranes are moist.   Eyes:      Extraocular Movements: Extraocular movements intact.   Cardiovascular:       Rate and Rhythm: Normal rate and regular rhythm.      Heart sounds: Normal heart sounds.   Pulmonary:      Effort: Pulmonary effort is normal. No respiratory distress.      Breath sounds: Normal breath sounds.      Comments: Some faint dry crackles in right base, but overall good air movement without wheezing  Abdominal:      General: Bowel sounds are normal. There is no distension.      Palpations: Abdomen is soft.      Comments: Incisions are clean, dry and intact.  Very slight diffuse tenderness as expected after surgery.   Musculoskeletal:         General: Normal range of motion.      Cervical back: Normal range of motion. No rigidity.      Comments: Some chronic edema at left ankle (due to prior ankle reconstructive surgery)   Skin:     General: Skin is warm and dry.   Neurological:      Mental Status: She is alert and oriented to person, place, and time.      Cranial Nerves: No cranial nerve deficit.      Coordination: Coordination normal.   Psychiatric:         Mood and Affect: Mood normal.         Behavior: Behavior normal.          Significant Labs: All pertinent labs within the past 24 hours have been reviewed.    Significant Imaging: I have reviewed all pertinent imaging results/findings within the past 24 hours.

## 2024-06-08 NOTE — ASSESSMENT & PLAN NOTE
-Noted mild elevation of AST and ALT with normal bilirubin  -These were normal prior to surgery  -Suspect secondary to instrumentation and should normalize over coming days  -Hold statin for now  -Repeat cmp in AM

## 2024-06-08 NOTE — PLAN OF CARE
Case Management Assessment     PCP: Correct in [facility]   Pharmacy: Bedside     Patient Arrived From: Home   Existing Help at Home: Family     Barriers to Discharge: None     Discharge Plan:    A. Home with family    B. Home health    06/08/24 1023   Discharge Assessment   Assessment Type Discharge Planning Assessment   Confirmed/corrected address, phone number and insurance Yes   Confirmed Demographics Correct on Facesheet   Source of Information patient;health record   People in Home spouse   Do you expect to return to your current living situation? Yes   Do you have help at home or someone to help you manage your care at home? Yes   Who are your caregiver(s) and their phone number(s)? Family   Prior to hospitilization cognitive status: Alert/Oriented   Current cognitive status: Alert/Oriented   Walking or Climbing Stairs Difficulty no   Dressing/Bathing Difficulty no   Equipment Currently Used at Home CPAP   Readmission within 30 days? No   Patient currently being followed by outpatient case management? No   Do you currently have service(s) that help you manage your care at home? No   Do you take prescription medications? Yes   Do you have prescription coverage? Yes   Do you have any problems affording any of your prescribed medications? No   Is the patient taking medications as prescribed? yes   How do you get to doctors appointments? car, drives self;family or friend will provide   Are you on dialysis? No   Do you take coumadin? No   Discharge Plan A Home with family   Discharge Plan B Home with family;Home Health   DME Needed Upon Discharge  none   Discharge Plan discussed with: Patient   Transition of Care Barriers None

## 2024-06-08 NOTE — PROGRESS NOTES
Postop day 2.      Status post laparoscopic cholecystectomy and extensive enterolysis of adhesions     Subjective   Tolerating diet   No nausea, vomiting   Some right-sided chest pain, shortness of breath     PE   Low-grade temp   Vital signs stable   Chest-clear   Heart-regular rate and rhythm   Abdomen- soft, nondistended, mild incisional tenderness, positive bowel sounds   Extremities- no tenderness     Imaging   CTA- no evidence of pulmonary embolus or pneumonia right-sided chest crepitus     Impression/plan   Right-sided chest pain, history of VTE in distant past (2001 ), no evidence of PE or pneumonia on CT   Continue Lovenox 40 mg for prophylaxis   Mild elevation in troponin - cardiology consult,

## 2024-06-08 NOTE — CONSULTS
[facility] ([address])  Cardiology  Consult Note    Patient Name: Estela Vázquez  MRN: [id number]  Admission Date: 2024  Hospital Length of Stay: 0 days  Code Status: Full Code   Attending Provider: Gumaro Murphy Jr., MD   Consulting Provider: Wesley Patel MD  Primary Care Physician: Marie Steinberg MD  Principal Problem:Gallbladder attack    Patient information was obtained from patient, past medical records, ER records, and primary team.     Inpatient consult to Cardiology  Consult performed by: Wesley Patel MD  Consult ordered by: Gumaro Hampton MD  Reason for consult: Chest pain        Subjective:     Chief Complaint:  Chest pain.    HPI:    Estela Vázquez is a 58 y.o.female with hypercholesterolemia. She is overweight. She has frequent ventricular premature contractions in the setting of normal systolic function. In  she had pulmonary embolism a few days after having had abdominal surgery. She says that initial tests revealed an anticardiolipid antibody but that this was no longer seen on repeated testing. She came in for elective cholecystectomy on 2024. She was admitted for observation. She had some pain in the left side of her chest on 2024 On 2024 she has felt sharp right sided chest pain that gets worse with inspiration. She denies shortness of breath.      Past Medical History:   Diagnosis Date    Arrhythmia     bigeminy & trigeminy     followed cardiologist    Pulmonary embolism     Bilateral    Sleep apnea     uses CPAP       Past Surgical History:   Procedure Laterality Date    ANKLE FRACTURE SURGERY Left     Plates & screw placed then removed 2024    APPENDECTOMY       SECTION N/A     COLONOSCOPY N/A 2024    Procedure: COLONOSCOPY;  Surgeon: Chucho Bennett MD;  Location: [address];  Service: Endoscopy;  Laterality: N/A;    ESOPHAGOGASTRODUODENOSCOPY N/A 2024    Procedure: EGD (ESOPHAGOGASTRODUODENOSCOPY);   Surgeon: Chucho Bennett MD;  Location: [facility] (2ND FLR);  Service: Endoscopy;  Laterality: N/A;  referred by igor Rhodes scheduling concerns scheduled on 2nd floor due to only availability. suprep instructions sent to pt portal.  4/22-precall complete-MS    Hip Scope Left     HYSTERECTOMY Bilateral     with tummy tuck    LAPAROSCOPIC CHOLECYSTECTOMY N/A 6/6/2024    Procedure: CHOLECYSTECTOMY, LAPAROSCOPIC;  Surgeon: Gumaro Murphy Jr., MD;  Location: [facility] OR;  Service: General;  Laterality: N/A;    LAPAROSCOPIC LYSIS OF ADHESIONS N/A 6/6/2024    Procedure: LYSIS, ADHESIONS, LAPAROSCOPIC;  Surgeon: Gumaro Murphy Jr., MD;  Location: [facility] OR;  Service: General;  Laterality: N/A;    LAPAROSCOPY W/ MINI-LAPAROTOMY Right     SHOULDER ARTHROSCOPY         Review of patient's allergies indicates:   Allergen Reactions    Morphine Itching     Can take all other pain meds       No current facility-administered medications on file prior to encounter.     Current Outpatient Medications on File Prior to Encounter   Medication Sig    rosuvastatin (CRESTOR) 5 MG tablet Take 5 mg by mouth once daily.    biotin 5 mg Tab Take 5 mg by mouth.    dicyclomine (BENTYL) 10 MG capsule Take 10 mg by mouth every 6 (six) hours as needed.    estradioL (ESTRACE) 0.01 % (0.1 mg/gram) vaginal cream estradiol 0.01% (0.1 mg/gram) vaginal cream    fluticasone propionate (FLONASE) 50 mcg/actuation nasal spray fluticasone propionate 50 mcg/actuation nasal spray,suspension    losartan (COZAAR) 25 MG tablet Take 25 mg by mouth every evening.     meloxicam (MOBIC) 15 MG tablet Take 1 tablet (15 mg total) by mouth once daily.    omega-3 fatty acids/fish oil (FISH OIL-OMEGA-3 FATTY ACIDS) 300-1,000 mg capsule Take 2 g by mouth.    ondansetron (ZOFRAN-ODT) 4 MG TbDL Take 1 tablet (4 mg total) by mouth 2 (two) times daily.    oxyCODONE-acetaminophen (PERCOCET) 5-325 mg per tablet Take 1 tablet by mouth every 6 (six) hours as needed for Pain.     Family  History       Problem Relation (Age of Onset)    Esophageal cancer Mother          Tobacco Use    Smoking status: Never    Smokeless tobacco: Never   Substance and Sexual Activity    Alcohol use: Yes     Comment: socially    Drug use: Never    Sexual activity: Yes     Partners: Male     Birth control/protection: None     Review of Systems   Constitutional: Negative for chills, fever and malaise/fatigue.   HENT:  Negative for nosebleeds and tinnitus.    Eyes:  Negative for double vision, vision loss in left eye and vision loss in right eye.   Cardiovascular:  Positive for chest pain. Negative for claudication, dyspnea on exertion, irregular heartbeat, leg swelling, near-syncope, orthopnea, palpitations, paroxysmal nocturnal dyspnea and syncope.   Respiratory:  Negative for cough, hemoptysis, shortness of breath and wheezing.    Endocrine: Negative for cold intolerance and heat intolerance.   Hematologic/Lymphatic: Negative for bleeding problem. Does not bruise/bleed easily.   Skin:  Negative for color change and rash.   Musculoskeletal:  Negative for back pain, falls, muscle weakness and myalgias.   Gastrointestinal:  Negative for abdominal pain, diarrhea, dysphagia, heartburn, hematemesis, hematochezia, hemorrhoids, jaundice, melena, nausea and vomiting.   Genitourinary:  Negative for dysuria and hematuria.   Neurological:  Negative for dizziness, focal weakness, headaches, light-headedness, loss of balance, numbness, tremors, vertigo and weakness.   Psychiatric/Behavioral:  Negative for altered mental status, depression and memory loss. The patient is not nervous/anxious.    Allergic/Immunologic: Negative for hives and persistent infections.     Objective:     Vital Signs (Most Recent):  Temp: 99.1 °F (37.3 °C) (06/08/24 0835)  Pulse: 76 (06/08/24 0835)  Resp: 18 (06/08/24 1033)  BP: 121/60 (06/08/24 0835)  SpO2: (!) 92 % (06/08/24 0845) Vital Signs (24h Range):  Temp:  [97.7 °F (36.5 °C)-100.8 °F (38.2 °C)] 99.1  °F (37.3 °C)  Pulse:  [56-76] 76  Resp:  [14-20] 18  SpO2:  [92 %-98 %] 92 %  BP: ()/(52-60) 121/60     Weight: 83 kg (183 lb)  Body mass index is 28.66 kg/m².    SpO2: (!) 92 %         Intake/Output Summary (Last 24 hours) at 6/8/2024 1344  Last data filed at 6/8/2024 0232  Gross per 24 hour   Intake 1650.81 ml   Output --   Net 1650.81 ml       Lines/Drains/Airways       Peripheral Intravenous Line  Duration                  Peripheral IV - Single Lumen 06/06/24 0602 20 G Left Hand 2 days         Peripheral IV - Single Lumen 06/08/24 1304 20 G Right Antecubital <1 day                    Physical Exam  Constitutional:       General: She is not in acute distress.     Appearance: Normal appearance. She is well-developed. She is not toxic-appearing or diaphoretic.   HENT:      Head: Normocephalic and atraumatic.      Nose: Nose normal.   Eyes:      General:         Right eye: No discharge.         Left eye: No discharge.      Conjunctiva/sclera:      Right eye: Right conjunctiva is not injected.      Left eye: Left conjunctiva is not injected.      Pupils: Pupils are equal.      Right eye: Pupil is round.      Left eye: Pupil is round.   Neck:      Thyroid: No thyromegaly.      Vascular: No carotid bruit or JVD.   Cardiovascular:      Rate and Rhythm: Normal rate and regular rhythm. No extrasystoles are present.     Chest Wall: PMI is not displaced.      Pulses:           Radial pulses are 2+ on the right side and 2+ on the left side.        Femoral pulses are 2+ on the right side and 2+ on the left side.       Dorsalis pedis pulses are 2+ on the right side and 2+ on the left side.        Posterior tibial pulses are 2+ on the right side and 2+ on the left side.      Heart sounds: S1 normal and S2 normal.      No gallop.   Pulmonary:      Effort: Pulmonary effort is normal.      Breath sounds: Normal breath sounds.   Chest:      Chest wall: Tenderness present.   Abdominal:      Palpations: Abdomen is soft.       Tenderness: There is no abdominal tenderness.   Musculoskeletal:      Cervical back: Neck supple.      Right lower leg: Normal. No swelling. No edema.      Left lower leg: Normal. No swelling. No edema.   Lymphadenopathy:      Head:      Right side of head: No submandibular adenopathy.      Left side of head: No submandibular adenopathy.      Cervical: No cervical adenopathy.   Skin:     General: Skin is warm and dry.      Findings: No rash.      Nails: There is no clubbing.   Neurological:      General: No focal deficit present.      Mental Status: She is alert and oriented to person, place, and time. She is not disoriented.      Cranial Nerves: No cranial nerve deficit.   Psychiatric:         Attention and Perception: Attention normal.         Mood and Affect: Mood and affect normal.         Speech: Speech normal.         Behavior: Behavior normal.         Thought Content: Thought content normal.         Cognition and Memory: Cognition and memory normal.         Judgment: Judgment normal.         Current Medications:     dicyclomine  10 mg Oral QID    docusate sodium  100 mg Oral Daily    enoxparin  40 mg Subcutaneous Q24H (prophylaxis, 1700)    losartan  25 mg Oral QHS    meloxicam  15 mg Oral Daily    ondansetron  4 mg Oral BID     Current Laboratory Results:    Recent Results (from the past 24 hour(s))   Troponin I    Collection Time: 06/08/24 10:36 AM   Result Value Ref Range    Troponin I 0.311 (H) 0.000 - 0.026 ng/mL   CBC Auto Differential    Collection Time: 06/08/24 11:36 AM   Result Value Ref Range    WBC 6.46 3.90 - 12.70 K/uL    RBC 3.95 (L) 4.00 - 5.40 M/uL    Hemoglobin 10.9 (L) 12.0 - 16.0 g/dL    Hematocrit 33.9 (L) 37.0 - 48.5 %    MCV 86 82 - 98 fL    MCH 27.6 27.0 - 31.0 pg    MCHC 32.2 32.0 - 36.0 g/dL    RDW 14.9 (H) 11.5 - 14.5 %    Platelets 168 150 - 450 K/uL    MPV 10.2 9.2 - 12.9 fL    Immature Granulocytes 0.2 0.0 - 0.5 %    Gran # (ANC) 3.8 1.8 - 7.7 K/uL    Immature Grans (Abs) 0.01  0.00 - 0.04 K/uL    Lymph # 1.9 1.0 - 4.8 K/uL    Mono # 0.5 0.3 - 1.0 K/uL    Eos # 0.2 0.0 - 0.5 K/uL    Baso # 0.01 0.00 - 0.20 K/uL    nRBC 0 0 /100 WBC    Gran % 59.0 38.0 - 73.0 %    Lymph % 29.4 18.0 - 48.0 %    Mono % 7.9 4.0 - 15.0 %    Eosinophil % 3.3 0.0 - 8.0 %    Basophil % 0.2 0.0 - 1.9 %    Differential Method Automated    Comprehensive Metabolic Panel    Collection Time: 06/08/24 11:36 AM   Result Value Ref Range    Sodium 139 136 - 145 mmol/L    Potassium 4.0 3.5 - 5.1 mmol/L    Chloride 106 95 - 110 mmol/L    CO2 25 23 - 29 mmol/L    Glucose 98 70 - 110 mg/dL    BUN 11 6 - 20 mg/dL    Creatinine 0.9 0.5 - 1.4 mg/dL    Calcium 9.0 8.7 - 10.5 mg/dL    Total Protein 5.8 (L) 6.0 - 8.4 g/dL    Albumin 3.5 3.5 - 5.2 g/dL    Total Bilirubin 0.6 0.1 - 1.0 mg/dL    Alkaline Phosphatase 84 55 - 135 U/L    AST 77 (H) 10 - 40 U/L     (H) 10 - 44 U/L    eGFR >60 >60 mL/min/1.73 m^2    Anion Gap 8 8 - 16 mmol/L   BNP    Collection Time: 06/08/24 11:36 AM   Result Value Ref Range     (H) 0 - 99 pg/mL     Current Imaging Results:    X-Ray Chest 1 View   Final Result      No acute abnormality.         Electronically signed by: Yamila Mckeon MD   Date:    06/08/2024   Time:    12:12      CTA Chest Non-Coronary (PE Studies)    (Results Pending)       11/30/2023: Echo:   1. Frequent ectopy.   2. Normal left ventricular systolic function. LVEF of > 55%.   3. Eccentric left ventricular hypertrophy.   4. Abnormal left ventricular strain (-15.6 %).   5. LV diastolic function is mildly abnormal (Grade I).   6. Mild mitral valve regurgitation.   7. Normal right ventricular systolic function.   8. Normal central venous pressure.      6/8/2024: ECG: SR. Anterolateral ST-T wave changes.    Assessment and Plan:     Active Diagnoses:    Diagnosis Date Noted POA    PRINCIPAL PROBLEM:  Gallbladder attack [K82.9] 06/06/2024 Yes    Chest pain [R07.9] 06/08/2024 Yes    Chronic diastolic congestive heart failure  [I50.32] 06/08/2024 Yes    PVC (premature ventricular contraction) [I49.3] 06/08/2024 Yes    Hyperlipidemia [E78.5] 06/08/2024 Yes    Irritable bowel syndrome with diarrhea [K58.0] 06/08/2024 Yes    History of pulmonary embolism [Z86.711] 06/08/2024 Yes    Normocytic anemia [D64.9] 06/08/2024 Yes    Abnormal LFTs [R79.89] 06/08/2024 Yes      Problems Resolved During this Admission:     Assessment and Plan:    Chest Pain   6/7/2024: Chest pain after laparoscopic cholecystectomy.   6/8/2024: ECG: SR. Anterolateral ST-T wave changes.   Troponin 0.311.   Tender over right chest wall.   Doubt pain due to myocardial ischemia.   Observe. Review Echo and CTA. Serial troponins.    2. Ventricular Premature Contractions   Known frequent VPCs.    3. History of Pulmonary Embolism   2001: After abdominal surgery.   On warfarin for 3-6 months.    4. Hypercholesterolemia   At home been on rosuvastatin 5 mg Q24 and niacin.     5. History of Cholecystectomy   6/6/2024: Laparoscopic cholecystectomy.      VTE Risk Mitigation (From admission, onward)           Ordered     enoxaparin injection 40 mg  Every 24 hours         06/06/24 1826     Place sequential compression device  Until discontinued         06/06/24 1826                    Thank you for your consult.     I will follow-up with patient. Please contact us if you have any additional questions.    Wesley Patel MD  Cardiology   [facility] ([address])

## 2024-06-08 NOTE — ASSESSMENT & PLAN NOTE
-Admitted by Dr. Murphy for planned cholecystectomy  -Surgery completed 6/7  -Noted fever and soft blood pressure overnight.  No leukocytosis at this time  -Continue incentive spirometry and DVT prophylaxis with lovenox.  -Defer management to primary service

## 2024-06-08 NOTE — ASSESSMENT & PLAN NOTE
-Hb prior to surgery was 13.5  -Hb now 10.9  -No evidence of bleeding  -Suspect drop is multifactorial due to expected blood loss from surgery and dilution from IV fluids  -Check iron, ferritin, b12 and folate tomorrow  -Repeat cbc in AM

## 2024-06-08 NOTE — ASSESSMENT & PLAN NOTE
-Follows with Dr. Chauhan and I reviewed his last clinic note from 4/19/24  -Appears euvolemic  -BNP only 170 and CXR without pulmonary edema  -Echo 11/30/23 showed EF >55%, grade I diastolic dysfunction and LVH  -Strict ins/outs and daily weights  -Continue home losartan

## 2024-06-09 LAB
ALBUMIN SERPL BCP-MCNC: 3.1 G/DL (ref 3.5–5.2)
ALP SERPL-CCNC: 78 U/L (ref 55–135)
ALT SERPL W/O P-5'-P-CCNC: 137 U/L (ref 10–44)
ANION GAP SERPL CALC-SCNC: 4 MMOL/L (ref 8–16)
AST SERPL-CCNC: 73 U/L (ref 10–40)
BASOPHILS # BLD AUTO: 0.03 K/UL (ref 0–0.2)
BASOPHILS NFR BLD: 0.5 % (ref 0–1.9)
BILIRUB SERPL-MCNC: 0.5 MG/DL (ref 0.1–1)
BUN SERPL-MCNC: 9 MG/DL (ref 6–20)
CALCIUM SERPL-MCNC: 8.7 MG/DL (ref 8.7–10.5)
CHLORIDE SERPL-SCNC: 106 MMOL/L (ref 95–110)
CO2 SERPL-SCNC: 29 MMOL/L (ref 23–29)
CREAT SERPL-MCNC: 0.8 MG/DL (ref 0.5–1.4)
DIFFERENTIAL METHOD BLD: ABNORMAL
EOSINOPHIL # BLD AUTO: 0.3 K/UL (ref 0–0.5)
EOSINOPHIL NFR BLD: 4.2 % (ref 0–8)
ERYTHROCYTE [DISTWIDTH] IN BLOOD BY AUTOMATED COUNT: 14.7 % (ref 11.5–14.5)
EST. GFR  (NO RACE VARIABLE): >60 ML/MIN/1.73 M^2
FERRITIN SERPL-MCNC: 139 NG/ML (ref 20–300)
FOLATE SERPL-MCNC: 12.5 NG/ML (ref 4–24)
GLUCOSE SERPL-MCNC: 102 MG/DL (ref 70–110)
HCT VFR BLD AUTO: 33.2 % (ref 37–48.5)
HGB BLD-MCNC: 10.4 G/DL (ref 12–16)
IMM GRANULOCYTES # BLD AUTO: 0.02 K/UL (ref 0–0.04)
IMM GRANULOCYTES NFR BLD AUTO: 0.3 % (ref 0–0.5)
IRON SERPL-MCNC: 29 UG/DL (ref 30–160)
LYMPHOCYTES # BLD AUTO: 2.3 K/UL (ref 1–4.8)
LYMPHOCYTES NFR BLD: 36.1 % (ref 18–48)
MAGNESIUM SERPL-MCNC: 1.7 MG/DL (ref 1.6–2.6)
MCH RBC QN AUTO: 27.2 PG (ref 27–31)
MCHC RBC AUTO-ENTMCNC: 31.3 G/DL (ref 32–36)
MCV RBC AUTO: 87 FL (ref 82–98)
MONOCYTES # BLD AUTO: 0.4 K/UL (ref 0.3–1)
MONOCYTES NFR BLD: 6.7 % (ref 4–15)
NEUTROPHILS # BLD AUTO: 3.3 K/UL (ref 1.8–7.7)
NEUTROPHILS NFR BLD: 52.2 % (ref 38–73)
NRBC BLD-RTO: 0 /100 WBC
OHS QRS DURATION: 96 MS
OHS QTC CALCULATION: 396 MS
OHS QTC CALCULATION: 450 MS
OHS QTC CALCULATION: 451 MS
PLATELET # BLD AUTO: 172 K/UL (ref 150–450)
PMV BLD AUTO: 10.6 FL (ref 9.2–12.9)
POTASSIUM SERPL-SCNC: 4.4 MMOL/L (ref 3.5–5.1)
PROT SERPL-MCNC: 5.4 G/DL (ref 6–8.4)
RBC # BLD AUTO: 3.82 M/UL (ref 4–5.4)
SATURATED IRON: 11 % (ref 20–50)
SODIUM SERPL-SCNC: 139 MMOL/L (ref 136–145)
TOTAL IRON BINDING CAPACITY: 258 UG/DL (ref 250–450)
TRANSFERRIN SERPL-MCNC: 174 MG/DL (ref 200–375)
TROPONIN I SERPL DL<=0.01 NG/ML-MCNC: 0.22 NG/ML (ref 0–0.03)
VIT B12 SERPL-MCNC: 1047 PG/ML (ref 210–950)
WBC # BLD AUTO: 6.23 K/UL (ref 3.9–12.7)

## 2024-06-09 PROCEDURE — 93005 ELECTROCARDIOGRAM TRACING: CPT

## 2024-06-09 PROCEDURE — 84484 ASSAY OF TROPONIN QUANT: CPT | Performed by: INTERNAL MEDICINE

## 2024-06-09 PROCEDURE — 93010 ELECTROCARDIOGRAM REPORT: CPT | Mod: ,,, | Performed by: INTERNAL MEDICINE

## 2024-06-09 PROCEDURE — G0378 HOSPITAL OBSERVATION PER HR: HCPCS

## 2024-06-09 PROCEDURE — 82607 VITAMIN B-12: CPT | Performed by: HOSPITALIST

## 2024-06-09 PROCEDURE — 25000003 PHARM REV CODE 250: Performed by: HOSPITALIST

## 2024-06-09 PROCEDURE — 93010 ELECTROCARDIOGRAM REPORT: CPT | Mod: 76,,, | Performed by: INTERNAL MEDICINE

## 2024-06-09 PROCEDURE — 63600175 PHARM REV CODE 636 W HCPCS: Performed by: SPECIALIST

## 2024-06-09 PROCEDURE — 96372 THER/PROPH/DIAG INJ SC/IM: CPT | Performed by: SPECIALIST

## 2024-06-09 PROCEDURE — 94761 N-INVAS EAR/PLS OXIMETRY MLT: CPT

## 2024-06-09 PROCEDURE — 99900035 HC TECH TIME PER 15 MIN (STAT)

## 2024-06-09 PROCEDURE — 80053 COMPREHEN METABOLIC PANEL: CPT | Performed by: HOSPITALIST

## 2024-06-09 PROCEDURE — 82728 ASSAY OF FERRITIN: CPT | Performed by: HOSPITALIST

## 2024-06-09 PROCEDURE — 85025 COMPLETE CBC W/AUTO DIFF WBC: CPT | Performed by: HOSPITALIST

## 2024-06-09 PROCEDURE — 25000003 PHARM REV CODE 250: Performed by: SPECIALIST

## 2024-06-09 PROCEDURE — 82746 ASSAY OF FOLIC ACID SERUM: CPT | Performed by: HOSPITALIST

## 2024-06-09 PROCEDURE — 83540 ASSAY OF IRON: CPT | Performed by: HOSPITALIST

## 2024-06-09 PROCEDURE — 83735 ASSAY OF MAGNESIUM: CPT | Performed by: HOSPITALIST

## 2024-06-09 RX ORDER — GLYCERIN 1 G/1
1 SUPPOSITORY RECTAL ONCE
Status: COMPLETED | OUTPATIENT
Start: 2024-06-09 | End: 2024-06-09

## 2024-06-09 RX ADMIN — OXYCODONE HYDROCHLORIDE 10 MG: 10 TABLET ORAL at 05:06

## 2024-06-09 RX ADMIN — DOCUSATE SODIUM 100 MG: 100 CAPSULE, LIQUID FILLED ORAL at 08:06

## 2024-06-09 RX ADMIN — ENOXAPARIN SODIUM 40 MG: 40 INJECTION SUBCUTANEOUS at 05:06

## 2024-06-09 RX ADMIN — DIPHENHYDRAMINE HYDROCHLORIDE 12.5 MG: 50 INJECTION, SOLUTION INTRAMUSCULAR; INTRAVENOUS at 10:06

## 2024-06-09 RX ADMIN — DICYCLOMINE HYDROCHLORIDE 10 MG: 10 CAPSULE ORAL at 08:06

## 2024-06-09 RX ADMIN — MELOXICAM 15 MG: 7.5 TABLET ORAL at 08:06

## 2024-06-09 RX ADMIN — ONDANSETRON 4 MG: 4 TABLET, ORALLY DISINTEGRATING ORAL at 08:06

## 2024-06-09 RX ADMIN — LOSARTAN POTASSIUM 25 MG: 25 TABLET, FILM COATED ORAL at 10:06

## 2024-06-09 RX ADMIN — GLYCERIN 1 SUPPOSITORY: 2 SUPPOSITORY RECTAL at 02:06

## 2024-06-09 RX ADMIN — SIMETHICONE 80 MG: 80 TABLET, CHEWABLE ORAL at 01:06

## 2024-06-09 RX ADMIN — ONDANSETRON 4 MG: 4 TABLET, ORALLY DISINTEGRATING ORAL at 10:06

## 2024-06-09 RX ADMIN — SODIUM CHLORIDE: 9 INJECTION, SOLUTION INTRAVENOUS at 09:06

## 2024-06-09 RX ADMIN — OXYCODONE HYDROCHLORIDE 10 MG: 10 TABLET ORAL at 09:06

## 2024-06-09 RX ADMIN — OXYCODONE HYDROCHLORIDE 10 MG: 10 TABLET ORAL at 01:06

## 2024-06-09 RX ADMIN — DICYCLOMINE HYDROCHLORIDE 10 MG: 10 CAPSULE ORAL at 01:06

## 2024-06-09 NOTE — SUBJECTIVE & OBJECTIVE
Interval History: No acute events overnight.  Feeling much better today.  She notes slight right chest wall pain as expected and as occurred after last abdominal surgery.  Denies shortness of breath.  Eager to go home.  All questions answered and patient had no further complaints.    Objective:     Vital Signs (Most Recent):  Temp: 97.7 °F (36.5 °C) (06/09/24 0435)  Pulse: (!) 59 (06/09/24 0435)  Resp: 18 (06/09/24 1313)  BP: (!) 100/55 (06/09/24 0435)  SpO2: 95 % (06/09/24 0906) Vital Signs (24h Range):  Temp:  [97.5 °F (36.4 °C)-98.4 °F (36.9 °C)] 97.7 °F (36.5 °C)  Pulse:  [55-83] 59  Resp:  [12-18] 18  SpO2:  [94 %-100 %] 95 %  BP: ()/(52-57) 100/55     Weight: 83 kg (183 lb)  Body mass index is 28.66 kg/m².    Intake/Output Summary (Last 24 hours) at 6/9/2024 1319  Last data filed at 6/9/2024 0710  Gross per 24 hour   Intake 240 ml   Output --   Net 240 ml         Physical Exam  Vitals and nursing note reviewed.   Constitutional:       General: She is not in acute distress.     Appearance: She is well-developed. She is not ill-appearing, toxic-appearing or diaphoretic.   HENT:      Head: Normocephalic and atraumatic.      Right Ear: External ear normal.      Left Ear: External ear normal.      Nose: Nose normal.      Mouth/Throat:      Mouth: Mucous membranes are moist.   Eyes:      Extraocular Movements: Extraocular movements intact.   Cardiovascular:      Rate and Rhythm: Normal rate and regular rhythm.      Heart sounds: Normal heart sounds.   Pulmonary:      Effort: Pulmonary effort is normal. No respiratory distress.      Breath sounds: Normal breath sounds.      Comments: good air movement without wheezing  Abdominal:      General: Bowel sounds are normal. There is no distension.      Palpations: Abdomen is soft.      Comments: Incisions are clean, dry and intact.  Very slight diffuse tenderness as expected after surgery.   Musculoskeletal:         General: Normal range of motion.      Cervical  back: Normal range of motion. No rigidity.      Comments: Some chronic edema at left ankle (due to prior ankle reconstructive surgery)   Skin:     General: Skin is warm and dry.   Neurological:      Mental Status: She is alert and oriented to person, place, and time.      Cranial Nerves: No cranial nerve deficit.      Coordination: Coordination normal.   Psychiatric:         Mood and Affect: Mood normal.         Behavior: Behavior normal.             Significant Labs: All pertinent labs within the past 24 hours have been reviewed.    Significant Imaging: I have reviewed all pertinent imaging results/findings within the past 24 hours.

## 2024-06-09 NOTE — NURSING
4:47 PM  Per verbal order by Dr. Murphy,  IV fluids discontinued.     6:23 PM  Shower complete. IV C/D/I. Ambulating in room. VSS on RA. SEEMA. Call bell w/in reach and safety rounds made.

## 2024-06-09 NOTE — PROGRESS NOTES
[facility] [address]  Cardiology  Progress Note    Patient Name: Estela Vázquez  MRN: [id number]  Admission Date: 6/6/2024  Hospital Length of Stay: 0 days  Code Status: Full Code   Attending Physician: Gumaro Murphy Jr., MD   Primary Care Physician: Marie Steinberg MD  Expected Discharge Date: 6/8/2024  Principal Problem:Gallbladder attack    Subjective:     Brief HPI:    Estela Vázquez is a 58 y.o.female with hypercholesterolemia. She is overweight. She has frequent ventricular premature contractions in the setting of normal systolic function. In 2001 she had pulmonary embolism a few days after she had undergone abdominal surgery. She says that initial tests revealed an anticardiolipid antibody but that this was no longer seen when testing was repeated. She came in for elective cholecystectomy on 6/6/2024. She was admitted for observation. She had some pain in the left side of her chest on 6/7/2024. On 6/8/2024 she has felt sharp right sided chest pain that gets worse with inspiration. She denies shortness of breath.    Hospital Course:     Observation.    6/8/2024: Echo: Normal left ventricular size and systolic function.EF 70%. Moderately dilated LA. Ascending aorta 4.2 cm. SPAP 39 mmHg.     6/8/2024: CTA Chest: The pulmonary trunk is dilated measuring 4 cm. Postoperative pneumoperitoneum related to recent cholecystectomy. Extensive subcutaneous emphysema seen along the right flank extending into the right lateral chest.    Interval History:     Feeling better.    No further chest pain.    Review of Systems   Constitutional: Negative for chills, fever and malaise/fatigue.   HENT:  Negative for nosebleeds.    Eyes:  Negative for vision loss in left eye and vision loss in right eye.   Cardiovascular:  Negative for chest pain, leg swelling, orthopnea, palpitations and paroxysmal nocturnal dyspnea.   Respiratory:  Negative for cough, hemoptysis, shortness of breath, sputum production and  wheezing.    Hematologic/Lymphatic: Negative for bleeding problem. Does not bruise/bleed easily.   Skin:  Negative for color change and rash.   Musculoskeletal:  Negative for muscle weakness and myalgias.   Gastrointestinal:  Positive for abdominal pain. Negative for heartburn, hematemesis, hematochezia, melena, nausea and vomiting.   Genitourinary:  Negative for hematuria.   Neurological:  Negative for dizziness, focal weakness, headaches, light-headedness, vertigo and weakness.   Psychiatric/Behavioral:  Negative for altered mental status. The patient is not nervous/anxious.    Allergic/Immunologic: Negative for persistent infections.     Objective:     Vital Signs (Most Recent):  Temp: 97.7 °F (36.5 °C) (06/09/24 0435)  Pulse: (!) 59 (06/09/24 0435)  Resp: 18 (06/09/24 0550)  BP: (!) 100/55 (06/09/24 0435)  SpO2: 95 % (06/09/24 0906) Vital Signs (24h Range):  Temp:  [97.5 °F (36.4 °C)-98.4 °F (36.9 °C)] 97.7 °F (36.5 °C)  Pulse:  [55-83] 59  Resp:  [12-18] 18  SpO2:  [94 %-100 %] 95 %  BP: ()/(52-57) 100/55     Weight: 83 kg (183 lb)  Body mass index is 28.66 kg/m².    SpO2: 95 %         Intake/Output Summary (Last 24 hours) at 6/9/2024 1208  Last data filed at 6/9/2024 0710  Gross per 24 hour   Intake 240 ml   Output --   Net 240 ml       Lines/Drains/Airways       Peripheral Intravenous Line  Duration                  Peripheral IV - Single Lumen 06/06/24 0602 20 G Left Hand 3 days                    Physical Exam  Constitutional:       General: She is not in acute distress.     Appearance: Normal appearance. She is well-developed. She is not ill-appearing or toxic-appearing.   Eyes:      Conjunctiva/sclera:      Right eye: Right conjunctiva is not injected. No hemorrhage.     Left eye: Left conjunctiva is not injected. No hemorrhage.  Neck:      Vascular: No JVD.   Cardiovascular:      Rate and Rhythm: Normal rate and regular rhythm.      Heart sounds: S1 normal and S2 normal. No murmur heard.     No  gallop.   Pulmonary:      Effort: Pulmonary effort is normal.      Breath sounds: Normal breath sounds.   Chest:      Chest wall: No swelling or tenderness.   Abdominal:      Tenderness: There is abdominal tenderness in the right upper quadrant.   Musculoskeletal:      Right ankle: No swelling.      Left ankle: No swelling.   Skin:     General: Skin is warm and dry.      Findings: No rash.   Neurological:      General: No focal deficit present.      Mental Status: She is alert and oriented to person, place, and time. She is not disoriented.   Psychiatric:         Attention and Perception: Attention normal.         Mood and Affect: Mood normal.         Speech: Speech normal.         Behavior: Behavior normal.         Thought Content: Thought content normal.         Cognition and Memory: Cognition and memory normal.         Judgment: Judgment normal.         Current Medications:     dicyclomine  10 mg Oral QID    docusate sodium  100 mg Oral Daily    enoxparin  40 mg Subcutaneous Q24H (prophylaxis, 1700)    losartan  25 mg Oral QHS    meloxicam  15 mg Oral Daily    ondansetron  4 mg Oral BID     Current Laboratory Results:    Recent Results (from the past 24 hour(s))   Echo    Collection Time: 06/08/24  1:32 PM   Result Value Ref Range    BSA 1.98 m2    LVOT stroke volume 96.97 cm3    LVIDd 5.31 3.5 - 6.0 cm    LV Systolic Volume 49.79 mL    LV Systolic Volume Index 25.5 mL/m2    LVIDs 3.47 2.1 - 4.0 cm    LV Diastolic Volume 136.16 mL    LV Diastolic Volume Index 69.83 mL/m2    IVS 0.96 0.6 - 1.1 cm    LVOT diameter 2.25 cm    LVOT area 4.0 cm2    FS 35 28 - 44 %    Left Ventricle Relative Wall Thickness 0.38 cm    Posterior Wall 1.02 0.6 - 1.1 cm    LV mass 198.37 g    LV Mass Index 102 g/m2    MV Peak E Master 0.60 m/s    TDI LATERAL 0.04 m/s    TDI SEPTAL 0.04 m/s    E/E' ratio 15.00 m/s    MV Peak A Master 0.80 m/s    TR Max Master 2.99 m/s    E/A ratio 0.75     IVRT 144.62 msec    E wave deceleration time 287.55 msec     LV SEPTAL E/E' RATIO 15.00 m/s    LV LATERAL E/E' RATIO 15.00 m/s    PV Peak S Master 0.50 m/s    PV Peak D Master 0.36 m/s    Pulm vein S/D ratio 1.39     LVOT peak master 1.22 m/s    Left Ventricular Outflow Tract Mean Velocity 0.84 cm/s    Left Ventricular Outflow Tract Mean Gradient 3.24 mmHg    RVDD 3.96 cm    RV S' 17.76 cm/s    RVOT peak VTI 19.8 cm    RV/LV Ratio 0.75 cm    LA size 4.46 cm    Left Atrium Minor Axis 4.42 cm    Left Atrium Major Axis 5.37 cm    LA volume (mod) 71.20 cm3    LA Volume Index (Mod) 36.5 mL/m2    RA Major Axis 5.25 cm    AV mean gradient 11 mmHg    AV peak gradient 20 mmHg    Ao peak master 2.22 m/s    Ao VTI 38.50 cm    LVOT peak VTI 24.40 cm    AV valve area 2.52 cm²    AV Velocity Ratio 0.55     AV index (prosthetic) 0.63     AJMES by Velocity Ratio 2.18 cm²    Mr max master 2.32 m/s    MV stenosis pressure 1/2 time 83.39 ms    MV valve area p 1/2 method 2.64 cm2    Triscuspid Valve Regurgitation Peak Gradient 36 mmHg    PV mean gradient 2 mmHg    PV PEAK VELOCITY 1.41 m/s    PV peak gradient 8 mmHg    RVOT peak master 1.00 m/s    STJ 3.57 cm    Ascending aorta 4.19 cm    IVC diameter 1.22 cm    Mean e' 0.04 m/s    ZLVIDS 0.12     ZLVIDD -0.46     LA Volume Index 43.4 mL/m2    LA volume 84.56 cm3    TAPSE 2.30 cm    LA WIDTH 4.6 cm    RA Width 5.1 cm    Sinus 3.5 cm    TV resting pulmonary artery pressure 39 mmHg    RV TB RVSP 6 mmHg    Est. RA pres 3 mmHg    EF 70 %   Troponin I    Collection Time: 06/08/24  2:26 PM   Result Value Ref Range    Troponin I 0.226 (H) 0.000 - 0.026 ng/mL   Troponin I    Collection Time: 06/08/24  6:24 PM   Result Value Ref Range    Troponin I 0.253 (H) 0.000 - 0.026 ng/mL   Troponin I    Collection Time: 06/08/24  9:42 PM   Result Value Ref Range    Troponin I 0.288 (H) 0.000 - 0.026 ng/mL   Troponin I    Collection Time: 06/08/24 10:47 PM   Result Value Ref Range    Troponin I 0.315 (H) 0.000 - 0.026 ng/mL   CBC Auto Differential    Collection Time: 06/09/24  4:51 AM    Result Value Ref Range    WBC 6.23 3.90 - 12.70 K/uL    RBC 3.82 (L) 4.00 - 5.40 M/uL    Hemoglobin 10.4 (L) 12.0 - 16.0 g/dL    Hematocrit 33.2 (L) 37.0 - 48.5 %    MCV 87 82 - 98 fL    MCH 27.2 27.0 - 31.0 pg    MCHC 31.3 (L) 32.0 - 36.0 g/dL    RDW 14.7 (H) 11.5 - 14.5 %    Platelets 172 150 - 450 K/uL    MPV 10.6 9.2 - 12.9 fL    Immature Granulocytes 0.3 0.0 - 0.5 %    Gran # (ANC) 3.3 1.8 - 7.7 K/uL    Immature Grans (Abs) 0.02 0.00 - 0.04 K/uL    Lymph # 2.3 1.0 - 4.8 K/uL    Mono # 0.4 0.3 - 1.0 K/uL    Eos # 0.3 0.0 - 0.5 K/uL    Baso # 0.03 0.00 - 0.20 K/uL    nRBC 0 0 /100 WBC    Gran % 52.2 38.0 - 73.0 %    Lymph % 36.1 18.0 - 48.0 %    Mono % 6.7 4.0 - 15.0 %    Eosinophil % 4.2 0.0 - 8.0 %    Basophil % 0.5 0.0 - 1.9 %    Differential Method Automated    Magnesium    Collection Time: 06/09/24  4:51 AM   Result Value Ref Range    Magnesium 1.7 1.6 - 2.6 mg/dL   Folate    Collection Time: 06/09/24  4:51 AM   Result Value Ref Range    Folate 12.5 4.0 - 24.0 ng/mL   Vitamin B12    Collection Time: 06/09/24  4:51 AM   Result Value Ref Range    Vitamin B-12 1047 (H) 210 - 950 pg/mL   Ferritin    Collection Time: 06/09/24  4:51 AM   Result Value Ref Range    Ferritin 139 20.0 - 300.0 ng/mL   Iron and TIBC    Collection Time: 06/09/24  4:51 AM   Result Value Ref Range    Iron 29 (L) 30 - 160 ug/dL    Transferrin 174 (L) 200 - 375 mg/dL    TIBC 258 250 - 450 ug/dL    Saturated Iron 11 (L) 20 - 50 %   Comprehensive Metabolic Panel    Collection Time: 06/09/24  4:51 AM   Result Value Ref Range    Sodium 139 136 - 145 mmol/L    Potassium 4.4 3.5 - 5.1 mmol/L    Chloride 106 95 - 110 mmol/L    CO2 29 23 - 29 mmol/L    Glucose 102 70 - 110 mg/dL    BUN 9 6 - 20 mg/dL    Creatinine 0.8 0.5 - 1.4 mg/dL    Calcium 8.7 8.7 - 10.5 mg/dL    Total Protein 5.4 (L) 6.0 - 8.4 g/dL    Albumin 3.1 (L) 3.5 - 5.2 g/dL    Total Bilirubin 0.5 0.1 - 1.0 mg/dL    Alkaline Phosphatase 78 55 - 135 U/L    AST 73 (H) 10 - 40 U/L    ALT  137 (H) 10 - 44 U/L    eGFR >60 >60 mL/min/1.73 m^2    Anion Gap 4 (L) 8 - 16 mmol/L   Troponin I    Collection Time: 06/09/24  4:51 AM   Result Value Ref Range    Troponin I 0.223 (H) 0.000 - 0.026 ng/mL     Current Imaging Results:    CTA Chest Non-Coronary (PE Studies)   Final Result      No evidence for pulmonary embolus or aortic dissection.      The pulmonary artery is prominent which can suggest pulmonary arterial hypertension.  Clinical correlation suggested.      No consolidation.      Expected postoperative pneumoperitoneum related to recent cholecystectomy.  However there is extensive subcutaneous emphysema seen along the right flank extending into the right lateral chest.         Electronically signed by: Yamila Mckeon MD   Date:    06/08/2024   Time:    14:18      X-Ray Chest 1 View   Final Result      No acute abnormality.         Electronically signed by: Yamila Mckeon MD   Date:    06/08/2024   Time:    12:12          11/30/2023: Echo:   1. Frequent ectopy.   2. Normal left ventricular systolic function. LVEF of > 55%.   3. Eccentric left ventricular hypertrophy.   4. Abnormal left ventricular strain (-15.6 %).   5. LV diastolic function is mildly abnormal (Grade I).   6. Mild mitral valve regurgitation.   7. Normal right ventricular systolic function.   8. Normal central venous pressure.        6/8/2024: ECG: SR. Anterolateral ST-T wave changes.      6/8/2024: Echo:  Left Ventricle: The left ventricle is normal in size. Mildly increased ventricular mass. Normal wall thickness. There is mild eccentric hypertrophy. There is normal systolic function. Ejection fraction by visual approximation is 70%. Grade I diastolic dysfunction. Elevated left ventricular filling pressure. Tissue Doppler velocity is reduced.  Right Ventricle: Normal right ventricular cavity size. Wall thickness is normal. Systolic function is normal.  Left Atrium: Left atrium is moderately dilated.  Aortic Valve: The aortic valve is  a trileaflet valve. There is mild aortic valve sclerosis.  Aorta: Aortic root is normal in size measuring 3.5 cm. Ascending aorta is mildly dilated measuring 4.19 cm.  Pulmonary Artery: The estimated pulmonary artery systolic pressure is 39 mmHg.  IVC/SVC: Normal venous pressure at 3 mmHg.      Assessment and Plan:     Problem List:    Active Diagnoses:    Diagnosis Date Noted POA    PRINCIPAL PROBLEM:  Gallbladder attack [K82.9] 06/06/2024 Yes    Chest pain [R07.9] 06/08/2024 Yes    Chronic diastolic congestive heart failure [I50.32] 06/08/2024 Yes    PVC (premature ventricular contraction) [I49.3] 06/08/2024 Yes    Hyperlipidemia [E78.5] 06/08/2024 Yes    Irritable bowel syndrome with diarrhea [K58.0] 06/08/2024 Yes    History of pulmonary embolism [Z86.711] 06/08/2024 Yes    Normocytic anemia [D64.9] 06/08/2024 Yes    Abnormal LFTs [R79.89] 06/08/2024 Yes      Problems Resolved During this Admission:     Assessment and Plan:     Chest Pain              6/7/2024: Chest pain after laparoscopic cholecystectomy.              6/8/2024: ECG: SR. Anterolateral ST-T wave changes.   6/8/2024: CTA Chest: The pulmonary trunk is dilated measuring 4 cm. Postoperative pneumoperitoneum related to recent cholecystectomy. Extensive subcutaneous emphysema seen along the right flank extending into the right lateral chest.              Troponin 0.311.              She was tender over right chest wall.              Doubt pain due to myocardial ischemia.   F/u ECG.               2. Ventricular Premature Contractions              Known frequent VPCs.     3. History of Pulmonary Embolism              2001: After abdominal surgery.              On warfarin for 3-6 months.     4. Hypercholesterolemia              At home been on rosuvastatin 5 mg Q24 and niacin.                 5. History of Cholecystectomy              6/6/2024: Laparoscopic cholecystectomy.      VTE Risk Mitigation (From admission, onward)           Ordered      enoxaparin injection 40 mg  Every 24 hours         06/06/24 1826     Place sequential compression device  Until discontinued         06/06/24 1826                    Wesley Patel MD  Cardiology  [facility] ([address])

## 2024-06-09 NOTE — NURSING
P.A. NOTIFIED OF PATIENTS SOFT BLOOD PRESSURE. iNSTRUCTED TO TAKE MANUALLY. Manual blood pressure 100/52. Instructed to hold.

## 2024-06-09 NOTE — PROGRESS NOTES
Postoperative day 3.    Diagnosis- laparoscopic cholecystectomy with extensive enterolysis of adhesions.  Postop chest pain    Subjective   Feeling better   Tolerating diet   No BM yet    PE   Afebrile   Vital signs stable   NAD     CTA   No evidence pulmonary embolus/pneumonia    Surgically stable   Cardiac issues as per cardiology

## 2024-06-09 NOTE — ASSESSMENT & PLAN NOTE
-Developed bilateral chest pain after her surgery on 6/7 and on 6/8 had only right sided chest pain worse with deep inspiration.  Noted history of PE after surgery 2001 (not on oral anticoagulation now), hyperlipidemia and diastolic chf.  Does not smoke and no history of CAD in either parent.  Denies heart burn symptoms and had normal EGD within the last year.  -CXR is unremarkable  -EKG shows TWI in I, V1-V6 as well as subtle ST depression in V3-V6.  Appears similar to EKG prior to surgery.  -Troponin slightly elevated at 0.311 with flat trend  -CTA without evidence of pulmonary embolism  -Echo showed EF 70%, grade I diastolic dysfunction and no regional wall motion abnormalities  -Cardiology consulted and input appreciated  -Suspect this was atypical pain due to her surgery and CO2 insufflation   -Monitor on telemetry

## 2024-06-09 NOTE — ASSESSMENT & PLAN NOTE
-Admitted by Dr. Murphy for planned cholecystectomy  -Surgery completed 6/7  -Afebrile since 6/7 at 23:33 and pain much improved today.  Labs appear stable.  She is ambulating, eating and passing gas but no bowel movement thus far.  Very eager to go home.  -Continue incentive spirometry and DVT prophylaxis with lovenox.  -Defer management to primary service

## 2024-06-09 NOTE — PROGRESS NOTES
[facility] [address] Medicine  Progress Note    Patient Name: Estela Vázquez  MRN: [id number]  Patient Class: OP- Observation   Admission Date: 6/6/2024  Length of Stay: 0 days  Attending Physician: Gumaro Murphy Jr., MD  Primary Care Provider: Marie Steinberg MD        Subjective:     Principal Problem:Gallbladder attack        HPI:  Ms. Vázquez is a 58 year old woman with history of pulmonary embolism (2001 after abdominal surgery), PVCs, chronic diastolic chf, irritable bowel syndrome (diarrhea predominant), prior diverticulitis and hyperlipidemia who was admitted by Dr. Murphy for planned cholecystectomy.  Her surgery was performed 6/7.  After her surgery she had bilateral chest pain which was felt to be secondary to CO2 insuffusion during her surgery.  The left sided chest pain has completely resolved, but the right sided chest pain persists.  This morning the right chest pain was severe, but presently it is very slight.  She states the pain is non-radiating and is worse with inspiration.  She denies other alleviating and exacerbating factors.  She notes it feels somewhat similar to the pain she had with her prior pulmonary embolism with the exception that her current pain does not radiate to her back.  She denies shortness of breath and palpitations.  She had nausea and chills this morning but no vomiting.  Her abdominal post-surgical pain is as she expected to be and is mild.  She is passing flatus but has had no bowel movements yet.        Overview/Hospital Course:  No notes on file    Interval History: No acute events overnight.  Feeling much better today.  She notes slight right chest wall pain as expected and as occurred after last abdominal surgery.  Denies shortness of breath.  Eager to go home.  All questions answered and patient had no further complaints.    Objective:     Vital Signs (Most Recent):  Temp: 97.7 °F (36.5 °C) (06/09/24 0435)  Pulse: (!) 59 (06/09/24  0435)  Resp: 18 (06/09/24 1313)  BP: (!) 100/55 (06/09/24 0435)  SpO2: 95 % (06/09/24 0906) Vital Signs (24h Range):  Temp:  [97.5 °F (36.4 °C)-98.4 °F (36.9 °C)] 97.7 °F (36.5 °C)  Pulse:  [55-83] 59  Resp:  [12-18] 18  SpO2:  [94 %-100 %] 95 %  BP: ()/(52-57) 100/55     Weight: 83 kg (183 lb)  Body mass index is 28.66 kg/m².    Intake/Output Summary (Last 24 hours) at 6/9/2024 1319  Last data filed at 6/9/2024 0710  Gross per 24 hour   Intake 240 ml   Output --   Net 240 ml         Physical Exam  Vitals and nursing note reviewed.   Constitutional:       General: She is not in acute distress.     Appearance: She is well-developed. She is not ill-appearing, toxic-appearing or diaphoretic.   HENT:      Head: Normocephalic and atraumatic.      Right Ear: External ear normal.      Left Ear: External ear normal.      Nose: Nose normal.      Mouth/Throat:      Mouth: Mucous membranes are moist.   Eyes:      Extraocular Movements: Extraocular movements intact.   Cardiovascular:      Rate and Rhythm: Normal rate and regular rhythm.      Heart sounds: Normal heart sounds.   Pulmonary:      Effort: Pulmonary effort is normal. No respiratory distress.      Breath sounds: Normal breath sounds.      Comments: good air movement without wheezing  Abdominal:      General: Bowel sounds are normal. There is no distension.      Palpations: Abdomen is soft.      Comments: Incisions are clean, dry and intact.  Very slight diffuse tenderness as expected after surgery.   Musculoskeletal:         General: Normal range of motion.      Cervical back: Normal range of motion. No rigidity.      Comments: Some chronic edema at left ankle (due to prior ankle reconstructive surgery)   Skin:     General: Skin is warm and dry.   Neurological:      Mental Status: She is alert and oriented to person, place, and time.      Cranial Nerves: No cranial nerve deficit.      Coordination: Coordination normal.   Psychiatric:         Mood and Affect:  Mood normal.         Behavior: Behavior normal.             Significant Labs: All pertinent labs within the past 24 hours have been reviewed.    Significant Imaging: I have reviewed all pertinent imaging results/findings within the past 24 hours.    Assessment/Plan:      * Gallbladder attack  -Admitted by Dr. Murphy for planned cholecystectomy  -Surgery completed 6/7  -Afebrile since 6/7 at 23:33 and pain much improved today.  Labs appear stable.  She is ambulating, eating and passing gas but no bowel movement thus far.  Very eager to go home.  -Continue incentive spirometry and DVT prophylaxis with lovenox.  -Defer management to primary service    Chest pain  -Developed bilateral chest pain after her surgery on 6/7 and on 6/8 had only right sided chest pain worse with deep inspiration.  Noted history of PE after surgery 2001 (not on oral anticoagulation now), hyperlipidemia and diastolic chf.  Does not smoke and no history of CAD in either parent.  Denies heart burn symptoms and had normal EGD within the last year.  -CXR is unremarkable  -EKG shows TWI in I, V1-V6 as well as subtle ST depression in V3-V6.  Appears similar to EKG prior to surgery.  -Troponin slightly elevated at 0.311 with flat trend  -CTA without evidence of pulmonary embolism  -Echo showed EF 70%, grade I diastolic dysfunction and no regional wall motion abnormalities  -Cardiology consulted and input appreciated  -Suspect this was atypical pain due to her surgery and CO2 insufflation   -Monitor on telemetry    Chronic diastolic congestive heart failure  -Follows with Dr. Chauhan and I reviewed his last clinic note from 4/19/24  -Appears euvolemic  -BNP only 170 and CXR without pulmonary edema  -Echo 11/30/23 showed EF >55%, grade I diastolic dysfunction and LVH  -Strict ins/outs and daily weights  -Continue home losartan    Normocytic anemia  -Hb prior to surgery was 13.5  -Hb now 10.4  -No evidence of bleeding  -Ferritin low normal and Tsat only  11.24%.  Folate and B12 are replete  -Has iron deficiency.  Would start FeSO4 when bowel function returns  -Repeat cbc in AM    Abnormal LFTs  -Noted mild elevation of AST and ALT with normal bilirubin  -These were normal prior to surgery  -Suspect secondary to instrumentation and should normalize over coming days  -Hold statin for now  -Repeat cmp in AM    PVC (premature ventricular contraction)  -History noted  -Monitor on telemetry    Hyperlipidemia  -Takes crestor 5mg qhs at home - not on formulary here  -Noting slightly elevated LFTs will hold statin for now    Irritable bowel syndrome with diarrhea  -History noted and she reports it is diarrhea predominant  -Await return of bowel function  -Provide symptomatic support as needed    History of pulmonary embolism  -Had PE after abdominal surgery in 2001 in [place] and was treated with coumadin for 3-6 months      VTE Risk Mitigation (From admission, onward)           Ordered     enoxaparin injection 40 mg  Every 24 hours         06/06/24 1826     Place sequential compression device  Until discontinued         06/06/24 1826                    Discharge Planning   SABA: 6/8/2024     Code Status: Full Code   Is the patient medically ready for discharge?:     Reason for patient still in hospital (select all that apply): Treatment and Pending disposition  Discharge Plan A: Home with family                  Gumaro Hampton MD  Department of Hospital Medicine   [facility] ([address])

## 2024-06-09 NOTE — ASSESSMENT & PLAN NOTE
-Hb prior to surgery was 13.5  -Hb now 10.4  -No evidence of bleeding  -Ferritin low normal and Tsat only 11.24%.  Folate and B12 are replete  -Has iron deficiency.  Would start FeSO4 when bowel function returns  -Repeat cbc in AM

## 2024-06-10 VITALS
BODY MASS INDEX: 27.86 KG/M2 | WEIGHT: 177.5 LBS | HEIGHT: 67 IN | RESPIRATION RATE: 17 BRPM | OXYGEN SATURATION: 98 % | DIASTOLIC BLOOD PRESSURE: 56 MMHG | TEMPERATURE: 98 F | HEART RATE: 74 BPM | SYSTOLIC BLOOD PRESSURE: 105 MMHG

## 2024-06-10 LAB
ALBUMIN SERPL BCP-MCNC: 3 G/DL (ref 3.5–5.2)
ALP SERPL-CCNC: 104 U/L (ref 55–135)
ALT SERPL W/O P-5'-P-CCNC: 154 U/L (ref 10–44)
ANION GAP SERPL CALC-SCNC: 6 MMOL/L (ref 8–16)
AST SERPL-CCNC: 95 U/L (ref 10–40)
BASOPHILS # BLD AUTO: 0.02 K/UL (ref 0–0.2)
BASOPHILS NFR BLD: 0.4 % (ref 0–1.9)
BILIRUB SERPL-MCNC: 0.4 MG/DL (ref 0.1–1)
BUN SERPL-MCNC: 9 MG/DL (ref 6–20)
CALCIUM SERPL-MCNC: 8.8 MG/DL (ref 8.7–10.5)
CHLORIDE SERPL-SCNC: 106 MMOL/L (ref 95–110)
CO2 SERPL-SCNC: 29 MMOL/L (ref 23–29)
CREAT SERPL-MCNC: 0.9 MG/DL (ref 0.5–1.4)
DIFFERENTIAL METHOD BLD: ABNORMAL
EOSINOPHIL # BLD AUTO: 0.2 K/UL (ref 0–0.5)
EOSINOPHIL NFR BLD: 4.9 % (ref 0–8)
ERYTHROCYTE [DISTWIDTH] IN BLOOD BY AUTOMATED COUNT: 14.4 % (ref 11.5–14.5)
EST. GFR  (NO RACE VARIABLE): >60 ML/MIN/1.73 M^2
GLUCOSE SERPL-MCNC: 91 MG/DL (ref 70–110)
HCT VFR BLD AUTO: 30.9 % (ref 37–48.5)
HGB BLD-MCNC: 9.8 G/DL (ref 12–16)
IMM GRANULOCYTES # BLD AUTO: 0.01 K/UL (ref 0–0.04)
IMM GRANULOCYTES NFR BLD AUTO: 0.2 % (ref 0–0.5)
LYMPHOCYTES # BLD AUTO: 2.1 K/UL (ref 1–4.8)
LYMPHOCYTES NFR BLD: 42.3 % (ref 18–48)
MAGNESIUM SERPL-MCNC: 1.7 MG/DL (ref 1.6–2.6)
MCH RBC QN AUTO: 27.4 PG (ref 27–31)
MCHC RBC AUTO-ENTMCNC: 31.7 G/DL (ref 32–36)
MCV RBC AUTO: 86 FL (ref 82–98)
MONOCYTES # BLD AUTO: 0.3 K/UL (ref 0.3–1)
MONOCYTES NFR BLD: 6.7 % (ref 4–15)
NEUTROPHILS # BLD AUTO: 2.2 K/UL (ref 1.8–7.7)
NEUTROPHILS NFR BLD: 45.5 % (ref 38–73)
NRBC BLD-RTO: 0 /100 WBC
OHS QRS DURATION: 94 MS
OHS QRS DURATION: 98 MS
OHS QTC CALCULATION: 440 MS
OHS QTC CALCULATION: 466 MS
PLATELET # BLD AUTO: 160 K/UL (ref 150–450)
PMV BLD AUTO: 10.8 FL (ref 9.2–12.9)
POTASSIUM SERPL-SCNC: 4.3 MMOL/L (ref 3.5–5.1)
PROT SERPL-MCNC: 5.1 G/DL (ref 6–8.4)
RBC # BLD AUTO: 3.58 M/UL (ref 4–5.4)
SODIUM SERPL-SCNC: 141 MMOL/L (ref 136–145)
WBC # BLD AUTO: 4.89 K/UL (ref 3.9–12.7)

## 2024-06-10 PROCEDURE — 83735 ASSAY OF MAGNESIUM: CPT | Performed by: HOSPITALIST

## 2024-06-10 PROCEDURE — 93010 ELECTROCARDIOGRAM REPORT: CPT | Mod: ,,, | Performed by: INTERNAL MEDICINE

## 2024-06-10 PROCEDURE — 36415 COLL VENOUS BLD VENIPUNCTURE: CPT | Performed by: HOSPITALIST

## 2024-06-10 PROCEDURE — 93005 ELECTROCARDIOGRAM TRACING: CPT

## 2024-06-10 PROCEDURE — 85025 COMPLETE CBC W/AUTO DIFF WBC: CPT | Performed by: HOSPITALIST

## 2024-06-10 PROCEDURE — 80053 COMPREHEN METABOLIC PANEL: CPT | Performed by: HOSPITALIST

## 2024-06-10 PROCEDURE — G0378 HOSPITAL OBSERVATION PER HR: HCPCS

## 2024-06-10 PROCEDURE — 25000003 PHARM REV CODE 250: Performed by: SPECIALIST

## 2024-06-10 PROCEDURE — 94761 N-INVAS EAR/PLS OXIMETRY MLT: CPT

## 2024-06-10 RX ORDER — OXYCODONE AND ACETAMINOPHEN 7.5; 325 MG/1; MG/1
1 TABLET ORAL EVERY 4 HOURS PRN
Qty: 20 TABLET | Refills: 0 | Status: SHIPPED | OUTPATIENT
Start: 2024-06-10

## 2024-06-10 RX ADMIN — MELOXICAM 15 MG: 7.5 TABLET ORAL at 09:06

## 2024-06-10 RX ADMIN — ACETAMINOPHEN 650 MG: 325 TABLET, FILM COATED ORAL at 09:06

## 2024-06-10 RX ADMIN — DOCUSATE SODIUM 100 MG: 100 CAPSULE, LIQUID FILLED ORAL at 09:06

## 2024-06-10 RX ADMIN — OXYCODONE HYDROCHLORIDE 10 MG: 10 TABLET ORAL at 05:06

## 2024-06-10 RX ADMIN — ONDANSETRON 4 MG: 4 TABLET, ORALLY DISINTEGRATING ORAL at 09:06

## 2024-06-10 NOTE — PLAN OF CARE
Problem: Adult Inpatient Plan of Care  Goal: Plan of Care Review  Outcome: Progressing  Flowsheets (Taken 6/10/2024 0257)  Plan of Care Reviewed With: patient

## 2024-06-10 NOTE — DISCHARGE SUMMARY
The patient is a 58 year female who was admitted for elective cholecystectomy on 06/06/2024.  Patient had past medical history of abdominal plasty and multiple gyn procedures.  On the day of admission she underwent laparoscopic cholecystectomy for symptomatic gallbladder disease.  Due to the extensive adhesions patient was admitted for observation.  Patient developed severe chest pain associated with shortness of breath on postop day 2.  Consultation was obtained with Hospital Medicine and Cardiology.  A CT scan of the chest with pulmonary embolism protocol was negative for PE and pneumonia.  There was however extensive subcutaneous edema on the right chest wall.  Cardiology evaluation showed a slight elevation in troponin.  Echocardiogram showed an EF of 70% with grade 1 diastolic dysfunction and no regional wall motion abnormalities.  EKG showed  ST and T-wave abnormality.  The patient is being discharged to be followed by Cardiology as an outpatient.  The patient's pain had resolved at the time of discharge.  The patient is being discharged to be followed by me in my office.  She has been instructed the appropriate limitations of her activity and diet as well as wound care.  Medication as outlined on the med reconciliation sheet

## 2024-06-10 NOTE — ASSESSMENT & PLAN NOTE
-Admitted by Dr. Murphy for planned cholecystectomy  -Surgery completed 6/7  -Afebrile since 6/7 at 23:33 and appears comfortable today.  Labs appear stable.  She is ambulating, eating and passing gas but no bowel movement thus far.  Very eager to go home.  -Continue incentive spirometry and DVT prophylaxis with lovenox.  -Defer management to primary service

## 2024-06-10 NOTE — PROGRESS NOTES
[facility] Surg [address]  Cardiology  Progress Note    Patient Name: Estela Vázquez  MRN: [id number]  Admission Date: 6/6/2024  Hospital Length of Stay: 0 days  Code Status: Full Code   Attending Physician: Gumaro Murphy Jr., MD   Primary Care Physician: Marie Steinberg MD  Expected Discharge Date: 6/8/2024  Principal Problem:Gallbladder attack    Subjective:     Brief HPI:    Estela Vázquez is a 58 y.o.female with hypercholesterolemia. She is overweight. She has frequent ventricular premature contractions in the setting of normal systolic function. In 2001 she had pulmonary embolism a few days after she had undergone abdominal surgery. She says that initial tests revealed an anticardiolipid antibody but that this was no longer seen when testing was repeated. She came in for elective cholecystectomy on 6/6/2024. She was admitted for observation. She had some pain in the left side of her chest on 6/7/2024. On 6/8/2024 she has felt sharp right sided chest pain that gets worse with inspiration. She denies shortness of breath.    Hospital Course:     Observation.    6/8/2024: Echo: Normal left ventricular size and systolic function.EF 70%. Moderately dilated LA. Ascending aorta 4.2 cm. SPAP 39 mmHg.     6/8/2024: CTA Chest: The pulmonary trunk is dilated measuring 4 cm. Postoperative pneumoperitoneum related to recent cholecystectomy. Extensive subcutaneous emphysema seen along the right flank extending into the right lateral chest.    Interval History:     Feeling better.    No further chest pain.    Review of Systems   Constitutional: Negative for chills, fever and malaise/fatigue.   HENT:  Negative for nosebleeds.    Eyes:  Negative for vision loss in left eye and vision loss in right eye.   Cardiovascular:  Negative for chest pain, leg swelling, orthopnea, palpitations and paroxysmal nocturnal dyspnea.   Respiratory:  Negative for cough, hemoptysis, shortness of breath, sputum production and  wheezing.    Hematologic/Lymphatic: Negative for bleeding problem. Does not bruise/bleed easily.   Skin:  Negative for color change and rash.   Musculoskeletal:  Negative for muscle weakness and myalgias.   Gastrointestinal:  Positive for abdominal pain. Negative for heartburn, hematemesis, hematochezia, melena, nausea and vomiting.   Genitourinary:  Negative for hematuria.   Neurological:  Negative for dizziness, focal weakness, headaches, light-headedness, vertigo and weakness.   Psychiatric/Behavioral:  Negative for altered mental status. The patient is not nervous/anxious.    Allergic/Immunologic: Negative for persistent infections.     Objective:     Vital Signs (Most Recent):  Temp: 98.1 °F (36.7 °C) (06/10/24 0720)  Pulse: (!) 58 (06/10/24 0720)  Resp: 14 (06/10/24 0720)  BP: (!) 102/55 (06/10/24 0720)  SpO2: (!) 94 % (06/10/24 0756) Vital Signs (24h Range):  Temp:  [97.3 °F (36.3 °C)-98.1 °F (36.7 °C)] 98.1 °F (36.7 °C)  Pulse:  [52-84] 58  Resp:  [12-19] 14  SpO2:  [92 %-100 %] 94 %  BP: ()/(54-60) 102/55     Weight: 80.5 kg (177 lb 7.5 oz)  Body mass index is 27.8 kg/m².    SpO2: (!) 94 %       No intake or output data in the 24 hours ending 06/10/24 0926      Lines/Drains/Airways       Peripheral Intravenous Line  Duration                  Peripheral IV - Single Lumen 06/09/24 1637 20 G Posterior;Right Hand <1 day                    Physical Exam  Constitutional:       General: She is not in acute distress.     Appearance: Normal appearance. She is well-developed. She is not ill-appearing or toxic-appearing.   Eyes:      Conjunctiva/sclera:      Right eye: Right conjunctiva is not injected. No hemorrhage.     Left eye: Left conjunctiva is not injected. No hemorrhage.  Neck:      Vascular: No JVD.   Cardiovascular:      Rate and Rhythm: Normal rate and regular rhythm.      Heart sounds: S1 normal and S2 normal. No murmur heard.     No gallop.   Pulmonary:      Effort: Pulmonary effort is normal.       Breath sounds: Normal breath sounds.   Chest:      Chest wall: No swelling or tenderness.   Abdominal:      Tenderness: There is abdominal tenderness in the right upper quadrant.   Musculoskeletal:      Right ankle: No swelling.      Left ankle: No swelling.   Skin:     General: Skin is warm and dry.      Findings: No rash.   Neurological:      General: No focal deficit present.      Mental Status: She is alert and oriented to person, place, and time. She is not disoriented.   Psychiatric:         Attention and Perception: Attention normal.         Mood and Affect: Mood normal.         Speech: Speech normal.         Behavior: Behavior normal.         Thought Content: Thought content normal.         Cognition and Memory: Cognition and memory normal.         Judgment: Judgment normal.         Current Medications:     docusate sodium  100 mg Oral Daily    enoxparin  40 mg Subcutaneous Q24H (prophylaxis, 1700)    losartan  25 mg Oral QHS    meloxicam  15 mg Oral Daily    ondansetron  4 mg Oral BID     Current Laboratory Results:    Recent Results (from the past 24 hour(s))   EKG 12-lead    Collection Time: 06/09/24  1:17 PM   Result Value Ref Range    QRS Duration 94 ms    OHS QTC Calculation 440 ms   CBC Auto Differential    Collection Time: 06/10/24  3:45 AM   Result Value Ref Range    WBC 4.89 3.90 - 12.70 K/uL    RBC 3.58 (L) 4.00 - 5.40 M/uL    Hemoglobin 9.8 (L) 12.0 - 16.0 g/dL    Hematocrit 30.9 (L) 37.0 - 48.5 %    MCV 86 82 - 98 fL    MCH 27.4 27.0 - 31.0 pg    MCHC 31.7 (L) 32.0 - 36.0 g/dL    RDW 14.4 11.5 - 14.5 %    Platelets 160 150 - 450 K/uL    MPV 10.8 9.2 - 12.9 fL    Immature Granulocytes 0.2 0.0 - 0.5 %    Gran # (ANC) 2.2 1.8 - 7.7 K/uL    Immature Grans (Abs) 0.01 0.00 - 0.04 K/uL    Lymph # 2.1 1.0 - 4.8 K/uL    Mono # 0.3 0.3 - 1.0 K/uL    Eos # 0.2 0.0 - 0.5 K/uL    Baso # 0.02 0.00 - 0.20 K/uL    nRBC 0 0 /100 WBC    Gran % 45.5 38.0 - 73.0 %    Lymph % 42.3 18.0 - 48.0 %    Mono % 6.7 4.0 -  15.0 %    Eosinophil % 4.9 0.0 - 8.0 %    Basophil % 0.4 0.0 - 1.9 %    Differential Method Automated    Magnesium    Collection Time: 06/10/24  3:45 AM   Result Value Ref Range    Magnesium 1.7 1.6 - 2.6 mg/dL   Comprehensive Metabolic Panel    Collection Time: 06/10/24  3:45 AM   Result Value Ref Range    Sodium 141 136 - 145 mmol/L    Potassium 4.3 3.5 - 5.1 mmol/L    Chloride 106 95 - 110 mmol/L    CO2 29 23 - 29 mmol/L    Glucose 91 70 - 110 mg/dL    BUN 9 6 - 20 mg/dL    Creatinine 0.9 0.5 - 1.4 mg/dL    Calcium 8.8 8.7 - 10.5 mg/dL    Total Protein 5.1 (L) 6.0 - 8.4 g/dL    Albumin 3.0 (L) 3.5 - 5.2 g/dL    Total Bilirubin 0.4 0.1 - 1.0 mg/dL    Alkaline Phosphatase 104 55 - 135 U/L    AST 95 (H) 10 - 40 U/L     (H) 10 - 44 U/L    eGFR >60 >60 mL/min/1.73 m^2    Anion Gap 6 (L) 8 - 16 mmol/L     Current Imaging Results:    CTA Chest Non-Coronary (PE Studies)   Final Result      No evidence for pulmonary embolus or aortic dissection.      The pulmonary artery is prominent which can suggest pulmonary arterial hypertension.  Clinical correlation suggested.      No consolidation.      Expected postoperative pneumoperitoneum related to recent cholecystectomy.  However there is extensive subcutaneous emphysema seen along the right flank extending into the right lateral chest.         Electronically signed by: Yamila Mckeon MD   Date:    06/08/2024   Time:    14:18      X-Ray Chest 1 View   Final Result      No acute abnormality.         Electronically signed by: Yamila Mckeon MD   Date:    06/08/2024   Time:    12:12          11/30/2023: Echo:   1. Frequent ectopy.   2. Normal left ventricular systolic function. LVEF of > 55%.   3. Eccentric left ventricular hypertrophy.   4. Abnormal left ventricular strain (-15.6 %).   5. LV diastolic function is mildly abnormal (Grade I).   6. Mild mitral valve regurgitation.   7. Normal right ventricular systolic function.   8. Normal central venous pressure.         6/8/2024: ECG: SR. Anterolateral ST-T wave changes.      6/8/2024: Echo:  Left Ventricle: The left ventricle is normal in size. Mildly increased ventricular mass. Normal wall thickness. There is mild eccentric hypertrophy. There is normal systolic function. Ejection fraction by visual approximation is 70%. Grade I diastolic dysfunction. Elevated left ventricular filling pressure. Tissue Doppler velocity is reduced.  Right Ventricle: Normal right ventricular cavity size. Wall thickness is normal. Systolic function is normal.  Left Atrium: Left atrium is moderately dilated.  Aortic Valve: The aortic valve is a trileaflet valve. There is mild aortic valve sclerosis.  Aorta: Aortic root is normal in size measuring 3.5 cm. Ascending aorta is mildly dilated measuring 4.19 cm.  Pulmonary Artery: The estimated pulmonary artery systolic pressure is 39 mmHg.  IVC/SVC: Normal venous pressure at 3 mmHg.      Assessment and Plan:     Problem List:    Active Diagnoses:    Diagnosis Date Noted POA    PRINCIPAL PROBLEM:  Gallbladder attack [K82.9] 06/06/2024 Yes    Chest pain [R07.9] 06/08/2024 Yes    Chronic diastolic congestive heart failure [I50.32] 06/08/2024 Yes    PVC (premature ventricular contraction) [I49.3] 06/08/2024 Yes    Hyperlipidemia [E78.5] 06/08/2024 Yes    Irritable bowel syndrome with diarrhea [K58.0] 06/08/2024 Yes    History of pulmonary embolism [Z86.711] 06/08/2024 Yes    Normocytic anemia [D64.9] 06/08/2024 Yes    Abnormal LFTs [R79.89] 06/08/2024 Yes      Problems Resolved During this Admission:     Assessment and Plan:     Chest Pain              6/7/2024: Chest pain after laparoscopic cholecystectomy.              6/8/2024: ECG: SR. Anterolateral ST-T wave changes.   6/8/2024: Echo: Normal left ventricular size and systolic function.EF 70%. Moderately dilated LA. Ascending aorta 4.2 cm. SPAP 39 mmHg.    6/8/2024: CTA Chest: The pulmonary trunk is dilated measuring 4 cm. Postoperative pneumoperitoneum  related to recent cholecystectomy. Extensive subcutaneous emphysema seen along the right flank extending into the right lateral chest.              Troponin 0.311.              She was tender over right chest wall.              Doubt pain due to myocardial ischemia.   Elevated PA pressure and size of pulmonary trunk may possibly be explained by CARI.    On losartan 25 mg Q24.   Would do Stress Echo as outpatient.               2. Ventricular Premature Contractions              Known frequent VPCs.     3. History of Pulmonary Embolism              2001: After abdominal surgery.              Received warfarin for 3-6 months.     4. Hypercholesterolemia              At home been on rosuvastatin 5 mg Q24 and niacin.   I would not favor niacin.    5. Overweight  6/10/2024: Weight 80 kg. BMI 28.    6. Obstructive Sleep Apnea  On CPAP.   Discussed importance of compliance.              5. History of Cholecystectomy              6/6/2024: Laparoscopic cholecystectomy.      VTE Risk Mitigation (From admission, onward)           Ordered     enoxaparin injection 40 mg  Every 24 hours         06/06/24 1826     Place sequential compression device  Until discontinued         06/06/24 1826                    Wesley Patel MD  Cardiology  [facility] ([address])

## 2024-06-10 NOTE — PLAN OF CARE
Pt eager & in agreement w/ DC. VU of DC instructions and the need to attend f/u appointment--paperwork passed & explained.  Script filled and delivered to bedside. IV removed w/ cath tip intact, WNL. Voiding, ambulating, & tolerating PO well. Lap sites WNL. To be DCd home--will be escorted downstairs via  transport team once dressed, ready & ride arrives. Pt discharged in no distress w/ spouse.

## 2024-06-10 NOTE — ASSESSMENT & PLAN NOTE
-Hb prior to surgery was 13.5  -Hb now 9.8  -No evidence of bleeding  -Ferritin low normal and Tsat only 11.24%.  Folate and B12 are replete  -Has iron deficiency.  Would start FeSO4 when bowel function returns  -Repeat cbc in AM

## 2024-06-10 NOTE — ASSESSMENT & PLAN NOTE
-Developed bilateral chest pain after her surgery on 6/7 and on 6/8 had only right sided chest pain worse with deep inspiration.  Noted history of PE after surgery 2001 (not on oral anticoagulation now), hyperlipidemia and diastolic chf.  Does not smoke and no history of CAD in either parent.  Denies heart burn symptoms and had normal EGD within the last year.  -CXR is unremarkable  -EKG shows TWI in I, V1-V6 as well as subtle ST depression in V3-V6.  Appears similar to EKG prior to surgery.  -Troponin slightly elevated at 0.311 with flat trend  -CTA without evidence of pulmonary embolism  -Echo showed EF 70%, grade I diastolic dysfunction and no regional wall motion abnormalities  -Cardiology consulted and input appreciated  -Suspect this was atypical pain due to her surgery and CO2 insufflation.  Cardiology recommends outpatient stress echo.  -Monitor on telemetry

## 2024-06-10 NOTE — PROGRESS NOTES
[facility] Surg [address] Medicine  Progress Note    Patient Name: Estela Vázquez  MRN: [id number]  Patient Class: OP- Observation   Admission Date: 6/6/2024  Length of Stay: 0 days  Attending Physician: Gumaro Murphy Jr., MD  Primary Care Provider: Marie Steinberg MD        Subjective:     Principal Problem:Gallbladder attack        HPI:  Ms. Vázquez is a 58 year old woman with history of pulmonary embolism (2001 after abdominal surgery), PVCs, chronic diastolic chf, irritable bowel syndrome (diarrhea predominant), prior diverticulitis and hyperlipidemia who was admitted by Dr. Murphy for planned cholecystectomy.  Her surgery was performed 6/7.  After her surgery she had bilateral chest pain which was felt to be secondary to CO2 insuffusion during her surgery.  The left sided chest pain has completely resolved, but the right sided chest pain persists.  This morning the right chest pain was severe, but presently it is very slight.  She states the pain is non-radiating and is worse with inspiration.  She denies other alleviating and exacerbating factors.  She notes it feels somewhat similar to the pain she had with her prior pulmonary embolism with the exception that her current pain does not radiate to her back.  She denies shortness of breath and palpitations.  She had nausea and chills this morning but no vomiting.  Her abdominal post-surgical pain is as she expected to be and is mild.  She is passing flatus but has had no bowel movements yet.        Overview/Hospital Course:  No notes on file    Interval History: No acute events overnight.  Still with some slight right chest and abdominal pain which she states is what she thinks is appropriate after surgery.  Not in any distress.  Denies sob.  No bowel movements but is passing gas.  All questions answered and patient had no further complaints.    Objective:     Vital Signs (Most Recent):  Temp: 97.9 °F (36.6 °C) (06/10/24 1118)  Pulse:  (!) 51 (06/10/24 1118)  Resp: (!) 9 (06/10/24 1118)  BP: (!) 109/57 (06/10/24 1118)  SpO2: 98 % (06/10/24 1118) Vital Signs (24h Range):  Temp:  [97.3 °F (36.3 °C)-98.1 °F (36.7 °C)] 97.9 °F (36.6 °C)  Pulse:  [51-84] 51  Resp:  [9-19] 9  SpO2:  [92 %-100 %] 98 %  BP: ()/(54-60) 109/57     Weight: 80.5 kg (177 lb 7.5 oz)  Body mass index is 27.8 kg/m².  No intake or output data in the 24 hours ending 06/10/24 1335        Physical Exam  Vitals and nursing note reviewed.   Constitutional:       General: She is not in acute distress.     Appearance: She is well-developed. She is not ill-appearing, toxic-appearing or diaphoretic.   HENT:      Head: Normocephalic and atraumatic.      Right Ear: External ear normal.      Left Ear: External ear normal.      Nose: Nose normal.      Mouth/Throat:      Mouth: Mucous membranes are moist.   Eyes:      Extraocular Movements: Extraocular movements intact.   Cardiovascular:      Rate and Rhythm: Normal rate and regular rhythm.      Heart sounds: Normal heart sounds.   Pulmonary:      Effort: Pulmonary effort is normal. No respiratory distress.      Breath sounds: Normal breath sounds.      Comments: good air movement without wheezing  Abdominal:      General: Bowel sounds are normal. There is no distension.      Palpations: Abdomen is soft.      Comments: Incisions are clean, dry and intact.  Very slight diffuse tenderness as expected after surgery.   Musculoskeletal:         General: Normal range of motion.      Cervical back: Normal range of motion. No rigidity.      Comments: Some chronic edema at left ankle (due to prior ankle reconstructive surgery)   Skin:     General: Skin is warm and dry.   Neurological:      Mental Status: She is alert and oriented to person, place, and time.      Cranial Nerves: No cranial nerve deficit.      Coordination: Coordination normal.   Psychiatric:         Mood and Affect: Mood normal.         Behavior: Behavior normal.              Significant Labs: All pertinent labs within the past 24 hours have been reviewed.    Significant Imaging: I have reviewed all pertinent imaging results/findings within the past 24 hours.    Assessment/Plan:      * Gallbladder attack  -Admitted by Dr. Muprhy for planned cholecystectomy  -Surgery completed 6/7  -Afebrile since 6/7 at 23:33 and appears comfortable today.  Labs appear stable.  She is ambulating, eating and passing gas but no bowel movement thus far.  Very eager to go home.  -Continue incentive spirometry and DVT prophylaxis with lovenox.  -Defer management to primary service    Chest pain  -Developed bilateral chest pain after her surgery on 6/7 and on 6/8 had only right sided chest pain worse with deep inspiration.  Noted history of PE after surgery 2001 (not on oral anticoagulation now), hyperlipidemia and diastolic chf.  Does not smoke and no history of CAD in either parent.  Denies heart burn symptoms and had normal EGD within the last year.  -CXR is unremarkable  -EKG shows TWI in I, V1-V6 as well as subtle ST depression in V3-V6.  Appears similar to EKG prior to surgery.  -Troponin slightly elevated at 0.311 with flat trend  -CTA without evidence of pulmonary embolism  -Echo showed EF 70%, grade I diastolic dysfunction and no regional wall motion abnormalities  -Cardiology consulted and input appreciated  -Suspect this was atypical pain due to her surgery and CO2 insufflation.  Cardiology recommends outpatient stress echo.  -Monitor on telemetry    Chronic diastolic congestive heart failure  -Follows with Dr. Chauhan and I reviewed his last clinic note from 4/19/24  -Appears euvolemic  -BNP only 170 and CXR without pulmonary edema  -Echo 11/30/23 showed EF >55%, grade I diastolic dysfunction and LVH  -Strict ins/outs and daily weights  -Continue home losartan    Normocytic anemia  -Hb prior to surgery was 13.5  -Hb now 9.8  -No evidence of bleeding  -Ferritin low normal and Tsat only 11.24%.   Folate and B12 are replete  -Has iron deficiency.  Would start FeSO4 when bowel function returns  -Repeat cbc in AM    Abnormal LFTs  -Noted mild elevation of AST and ALT with normal bilirubin - a bit higher today.  -These were normal prior to surgery  -Suspect secondary to instrumentation and should normalize over coming days  -Hold statin for now  -Repeat cmp in AM    PVC (premature ventricular contraction)  -History noted  -Monitor on telemetry    Hyperlipidemia  -Takes crestor 5mg qhs at home - not on formulary here  -Noting slightly elevated LFTs will hold statin for now    Irritable bowel syndrome with diarrhea  -History noted and she reports it is diarrhea predominant  -Await return of bowel function  -Provide symptomatic support as needed    History of pulmonary embolism  -Had PE after abdominal surgery in 2001 in [place] and was treated with coumadin for 3-6 months      VTE Risk Mitigation (From admission, onward)           Ordered     enoxaparin injection 40 mg  Every 24 hours         06/06/24 1826     Place sequential compression device  Until discontinued         06/06/24 1826                    Discharge Planning   SABA: 6/8/2024     Code Status: Full Code   Is the patient medically ready for discharge?:     Reason for patient still in hospital (select all that apply): Pending disposition  Discharge Plan A: Home with family                  Gumaro Hampton MD  Department of Hospital Medicine   [facility] ([address])

## 2024-06-10 NOTE — ASSESSMENT & PLAN NOTE
-Noted mild elevation of AST and ALT with normal bilirubin - a bit higher today.  -These were normal prior to surgery  -Suspect secondary to instrumentation and should normalize over coming days  -Hold statin for now  -Repeat cmp in AM

## 2024-06-10 NOTE — SUBJECTIVE & OBJECTIVE
Interval History: No acute events overnight.  Still with some slight right chest and abdominal pain which she states is what she thinks is appropriate after surgery.  Not in any distress.  Denies sob.  No bowel movements but is passing gas.  All questions answered and patient had no further complaints.    Objective:     Vital Signs (Most Recent):  Temp: 97.9 °F (36.6 °C) (06/10/24 1118)  Pulse: (!) 51 (06/10/24 1118)  Resp: (!) 9 (06/10/24 1118)  BP: (!) 109/57 (06/10/24 1118)  SpO2: 98 % (06/10/24 1118) Vital Signs (24h Range):  Temp:  [97.3 °F (36.3 °C)-98.1 °F (36.7 °C)] 97.9 °F (36.6 °C)  Pulse:  [51-84] 51  Resp:  [9-19] 9  SpO2:  [92 %-100 %] 98 %  BP: ()/(54-60) 109/57     Weight: 80.5 kg (177 lb 7.5 oz)  Body mass index is 27.8 kg/m².  No intake or output data in the 24 hours ending 06/10/24 1335        Physical Exam  Vitals and nursing note reviewed.   Constitutional:       General: She is not in acute distress.     Appearance: She is well-developed. She is not ill-appearing, toxic-appearing or diaphoretic.   HENT:      Head: Normocephalic and atraumatic.      Right Ear: External ear normal.      Left Ear: External ear normal.      Nose: Nose normal.      Mouth/Throat:      Mouth: Mucous membranes are moist.   Eyes:      Extraocular Movements: Extraocular movements intact.   Cardiovascular:      Rate and Rhythm: Normal rate and regular rhythm.      Heart sounds: Normal heart sounds.   Pulmonary:      Effort: Pulmonary effort is normal. No respiratory distress.      Breath sounds: Normal breath sounds.      Comments: good air movement without wheezing  Abdominal:      General: Bowel sounds are normal. There is no distension.      Palpations: Abdomen is soft.      Comments: Incisions are clean, dry and intact.  Very slight diffuse tenderness as expected after surgery.   Musculoskeletal:         General: Normal range of motion.      Cervical back: Normal range of motion. No rigidity.      Comments: Some  chronic edema at left ankle (due to prior ankle reconstructive surgery)   Skin:     General: Skin is warm and dry.   Neurological:      Mental Status: She is alert and oriented to person, place, and time.      Cranial Nerves: No cranial nerve deficit.      Coordination: Coordination normal.   Psychiatric:         Mood and Affect: Mood normal.         Behavior: Behavior normal.             Significant Labs: All pertinent labs within the past 24 hours have been reviewed.    Significant Imaging: I have reviewed all pertinent imaging results/findings within the past 24 hours.

## 2024-06-10 NOTE — PLAN OF CARE
CM met with pt and spouse for final discharge planning assessment. Pt is ready for discharge home today.    Pt will receive calls from [facility] and [facility] offices to schedule follow-up apts.    Spouse to provide transportation home.    Meds to be delivered to bedside from [facility].    Pt is ready for discharge from CM perspective.   06/10/24 1624   Final Note   Assessment Type Final Discharge Note   Anticipated Discharge Disposition Home   What phone number can be called within the next 1-3 days to see how you are doing after discharge? [phone]   Hospital Resources/Appts/Education Provided Provided patient/caregiver with written discharge plan information;Provided education on problems/symptoms using teachback   Post-Acute Status   Discharge Delays None known at this time     [facility] ([address])  Discharge Final Note    Primary Care Provider: Marie Steinberg MD    Expected Discharge Date: 6/10/2024    Final Discharge Note (most recent)       Final Note - 06/10/24 1624          Final Note    Assessment Type Final Discharge Note (P)      Anticipated Discharge Disposition Home or Self Care (P)      What phone number can be called within the next 1-3 days to see how you are doing after discharge? [phone] (P)      Hospital Resources/Appts/Education Provided Provided patient/caregiver with written discharge plan information;Provided education on problems/symptoms using teachback (P)         Post-Acute Status    Discharge Delays None known at this time (P)                      Important Message from Medicare

## 2024-06-10 NOTE — PLAN OF CARE
CM met with pt and spouse for final discharge planning assessment. Pt is ready for  discharge home today. Spouse to provide transportation home.    Meds to be delivered to bedside prior to discharge.    Pt is ready for discharge from CM perspective.   06/10/24 1624   Final Note   Assessment Type Final Discharge Note   Anticipated Discharge Disposition Home   What phone number can be called within the next 1-3 days to see how you are doing after discharge? [phone]   Hospital Resources/Appts/Education Provided Provided patient/caregiver with written discharge plan information;Provided education on problems/symptoms using teachback   Post-Acute Status   Discharge Delays None known at this time     [facility] ([address])  Discharge Final Note    Primary Care Provider: Marie Steinberg MD    Expected Discharge Date: 6/10/2024    Final Discharge Note (most recent)       Final Note - 06/10/24 1624          Final Note    Assessment Type Final Discharge Note     Anticipated Discharge Disposition Home or Self Care     What phone number can be called within the next 1-3 days to see how you are doing after discharge? [phone]     Hospital Resources/Appts/Education Provided Provided patient/caregiver with written discharge plan information;Provided education on problems/symptoms using teachback        Post-Acute Status    Discharge Delays None known at this time                     Important Message from Medicare

## 2024-06-11 ENCOUNTER — TELEPHONE (OUTPATIENT)
Dept: CARDIOLOGY | Facility: CLINIC | Age: 59
End: 2024-06-11
Payer: COMMERCIAL

## 2024-06-11 LAB
FINAL PATHOLOGIC DIAGNOSIS: NORMAL
GROSS: NORMAL
Lab: NORMAL
MICROSCOPIC EXAM: NORMAL

## 2024-06-11 NOTE — TELEPHONE ENCOUNTER
Spoke to patient- appt scheduled for 6/12/24      ----- Message from Sharon Sr sent at 6/11/2024  9:27 AM CDT -----  Regarding: call back  Type: Patient Call Back    Who called: pt     What is the request in detail: requesting a call to discuss f/u appt requested by Case Management     Can the clinic reply by [facility]?no    Would the patient rather a call back or a response via [facility]? call    Best call back number: [phone]     Additional Information:

## 2024-06-12 ENCOUNTER — OFFICE VISIT (OUTPATIENT)
Dept: CARDIOLOGY | Facility: CLINIC | Age: 59
End: 2024-06-12
Attending: INTERNAL MEDICINE
Payer: COMMERCIAL

## 2024-06-12 VITALS
HEART RATE: 52 BPM | WEIGHT: 186.5 LBS | DIASTOLIC BLOOD PRESSURE: 64 MMHG | BODY MASS INDEX: 29.27 KG/M2 | SYSTOLIC BLOOD PRESSURE: 133 MMHG | OXYGEN SATURATION: 97 % | HEIGHT: 67 IN

## 2024-06-12 DIAGNOSIS — Z90.49 HISTORY OF CHOLECYSTECTOMY: ICD-10-CM

## 2024-06-12 DIAGNOSIS — G47.33 OBSTRUCTIVE SLEEP APNEA: ICD-10-CM

## 2024-06-12 DIAGNOSIS — E66.3 OVERWEIGHT: ICD-10-CM

## 2024-06-12 DIAGNOSIS — K82.9 GALLBLADDER ATTACK: ICD-10-CM

## 2024-06-12 DIAGNOSIS — E78.00 HYPERCHOLESTEROLEMIA: ICD-10-CM

## 2024-06-12 DIAGNOSIS — Z86.711 HISTORY OF PULMONARY EMBOLISM: ICD-10-CM

## 2024-06-12 DIAGNOSIS — I49.3 VENTRICULAR PREMATURE BEATS: ICD-10-CM

## 2024-06-12 DIAGNOSIS — Z87.898 HISTORY OF CHEST PAIN: ICD-10-CM

## 2024-06-12 PROCEDURE — 1159F MED LIST DOCD IN RCRD: CPT | Mod: CPTII,S$GLB,, | Performed by: INTERNAL MEDICINE

## 2024-06-12 PROCEDURE — 99215 OFFICE O/P EST HI 40 MIN: CPT | Mod: S$GLB,,, | Performed by: INTERNAL MEDICINE

## 2024-06-12 PROCEDURE — 3044F HG A1C LEVEL LT 7.0%: CPT | Mod: CPTII,S$GLB,, | Performed by: INTERNAL MEDICINE

## 2024-06-12 PROCEDURE — 99999 PR PBB SHADOW E&M-EST. PATIENT-LVL V: CPT | Mod: PBBFAC,,, | Performed by: INTERNAL MEDICINE

## 2024-06-12 PROCEDURE — 4010F ACE/ARB THERAPY RXD/TAKEN: CPT | Mod: CPTII,S$GLB,, | Performed by: INTERNAL MEDICINE

## 2024-06-12 PROCEDURE — 3008F BODY MASS INDEX DOCD: CPT | Mod: CPTII,S$GLB,, | Performed by: INTERNAL MEDICINE

## 2024-06-12 PROCEDURE — 1160F RVW MEDS BY RX/DR IN RCRD: CPT | Mod: CPTII,S$GLB,, | Performed by: INTERNAL MEDICINE

## 2024-06-12 PROCEDURE — 3078F DIAST BP <80 MM HG: CPT | Mod: CPTII,S$GLB,, | Performed by: INTERNAL MEDICINE

## 2024-06-12 PROCEDURE — 3075F SYST BP GE 130 - 139MM HG: CPT | Mod: CPTII,S$GLB,, | Performed by: INTERNAL MEDICINE

## 2024-06-12 RX ORDER — ASPIRIN 81 MG/1
81 TABLET ORAL DAILY
Qty: 90 TABLET | Refills: 3 | COMMUNITY
Start: 2024-06-12

## 2024-06-12 RX ORDER — ROSUVASTATIN CALCIUM 10 MG/1
10 TABLET, COATED ORAL DAILY
Qty: 90 TABLET | Refills: 3 | Status: SHIPPED | OUTPATIENT
Start: 2024-06-12

## 2024-06-12 RX ORDER — LOSARTAN POTASSIUM 25 MG/1
25 TABLET ORAL NIGHTLY
Qty: 90 TABLET | Refills: 3 | Status: SHIPPED | OUTPATIENT
Start: 2024-06-12

## 2024-06-12 NOTE — PROGRESS NOTES
Subjective     Estela Vázquez is a 58 y.o. female with cholesterolemia. She is overweight. She has obstructive sleep apnea. She has frequent ventricular premature contractions in the setting of normal systolic function. In 2001 she had pulmonary embolism a few days after she had undergone abdominal surgery. She says that initial tests revealed an anticardiolipid antibody but that this was no longer seen when testing was repeated. She came in for elective cholecystectomy on 6/6/2024. She was admitted for observation. She had some pain in the left side of her chest on 6/7/2024. On 6/8/2024 she has felt sharp right sided chest pain that gets worse with inspiration. She denied shortness of breath. On 6/8/2024 she had an echocardiogram that revealed normal left ventricular size and systolic function. The ejection fraction was 70%. The left atrium was moderately dilated. The ascending aorta appeared mildly enlarged being 4.2 cm. The systolic pulmonary artery pressure was 39 mmHg. On 6/8/2024 a computed tomography scan of her chest revealed the pulmonary trunk was dilated measuring 4 cm. The postoperative pneumoperitoneum appeared related to recent cholecystectomy. There was extensive subcutaneous emphysema seen along the right flank extending into the right lateral chest.She comes back to see me and want me to assume her cardiac care.      Chest Pain   This is a new problem. The onset quality is sudden. The problem occurs constantly. The problem has been unchanged. The pain is at a severity of 3/10. The pain is mild. The quality of the pain is described as burning. Associated symptoms include palpitations. Pertinent negatives include no abdominal pain, back pain, claudication, cough, diaphoresis, dizziness, fever, headaches, hemoptysis, irregular heartbeat, leg pain, malaise/fatigue, nausea, near-syncope, numbness, orthopnea, PND, shortness of breath, syncope, vomiting or weakness.   Her past medical history is  significant for hyperlipidemia.   Pertinent negatives for past medical history include no diabetes and no muscle weakness.   Palpitations   Associated symptoms include chest pain. Pertinent negatives include no anxiety, chest fullness, coughing, diaphoresis, dizziness, fever, irregular heartbeat, malaise/fatigue, nausea, near-syncope, numbness, shortness of breath, syncope, vomiting or weakness.   Hyperlipidemia  She has no history of chronic renal disease, diabetes, hypothyroidism, liver disease or nephrotic syndrome. Associated symptoms include chest pain. Pertinent negatives include no focal sensory loss, focal weakness, leg pain, myalgias or shortness of breath.       Review of Systems   Constitutional: Negative for chills, diaphoresis, fever and malaise/fatigue.   HENT:  Negative for nosebleeds and tinnitus.    Eyes:  Negative for double vision, vision loss in left eye and vision loss in right eye.   Cardiovascular:  Positive for chest pain and palpitations. Negative for claudication, dyspnea on exertion, irregular heartbeat, leg swelling, near-syncope, orthopnea, paroxysmal nocturnal dyspnea and syncope.   Respiratory:  Negative for cough, hemoptysis, shortness of breath and wheezing.    Endocrine: Negative for cold intolerance and heat intolerance.   Hematologic/Lymphatic: Negative for bleeding problem. Does not bruise/bleed easily.   Skin:  Negative for color change and rash.   Musculoskeletal:  Negative for back pain, falls, muscle weakness and myalgias.   Gastrointestinal:  Negative for abdominal pain, diarrhea, dysphagia, heartburn, hematemesis, hematochezia, hemorrhoids, jaundice, melena, nausea and vomiting.   Genitourinary:  Negative for dysuria and hematuria.   Neurological:  Negative for dizziness, focal weakness, headaches, light-headedness, loss of balance, numbness, tremors, vertigo and weakness.   Psychiatric/Behavioral:  Negative for altered mental status, depression and memory loss. The patient  is not nervous/anxious.    Allergic/Immunologic: Negative for hives and persistent infections.       Current Outpatient Medications on File Prior to Visit   Medication Sig Dispense Refill    biotin 5 mg Tab Take 5 mg by mouth.      calcium carbonate/vitamin D3 (VITAMIN D-3 ORAL) Take by mouth once daily.      coenzyme Q10 (CO Q-10) 100 mg capsule Take 100 mg by mouth once daily.      L. acidophilus/Bifid. animalis (PROBIOTIC) 5 billion cell CpSP Take by mouth once daily.      losartan (COZAAR) 25 MG tablet Take 25 mg by mouth every evening.       multivitamin (THERAGRAN) per tablet Take 1 tablet by mouth once daily.      psyllium husk (METAMUCIL ORAL) Take by mouth once daily.      riboflavin, vitamin B2, (VITAMIN B-2 ORAL) Take by mouth once daily.      rosuvastatin (CRESTOR) 5 MG tablet Take 5 mg by mouth once daily.      dicyclomine (BENTYL) 10 MG capsule Take 10 mg by mouth every 6 (six) hours as needed. (Patient not taking: Reported on 6/12/2024)      estradioL (ESTRACE) 0.01 % (0.1 mg/gram) vaginal cream estradiol 0.01% (0.1 mg/gram) vaginal cream (Patient not taking: Reported on 6/12/2024)      fluticasone propionate (FLONASE) 50 mcg/actuation nasal spray fluticasone propionate 50 mcg/actuation nasal spray,suspension (Patient not taking: Reported on 6/12/2024)      meloxicam (MOBIC) 15 MG tablet Take 1 tablet (15 mg total) by mouth once daily. (Patient not taking: Reported on 6/12/2024) 30 tablet 3    niacinamide 500 mg Tab Take 500 mg by mouth once daily. (Patient not taking: Reported on 6/12/2024)      omega-3 fatty acids/fish oil (FISH OIL-OMEGA-3 FATTY ACIDS) 300-1,000 mg capsule Take 2 g by mouth. (Patient not taking: Reported on 6/12/2024)      ondansetron (ZOFRAN-ODT) 4 MG TbDL Take 1 tablet (4 mg total) by mouth 2 (two) times daily. (Patient not taking: Reported on 6/12/2024) 12 tablet 0    oxyCODONE-acetaminophen (PERCOCET) 5-325 mg per tablet Take 1 tablet by mouth every 6 (six) hours as needed for  "Pain. (Patient not taking: Reported on 6/12/2024) 12 tablet 0    oxyCODONE-acetaminophen (PERCOCET) 7.5-325 mg per tablet Take 1 tablet by mouth every 4 (four) hours as needed for Pain. (Patient not taking: Reported on 6/12/2024) 20 tablet 0     No current facility-administered medications on file prior to visit.        /64   Pulse (!) 52   Ht 5' 7" (1.702 m)   Wt 84.6 kg (186 lb 8.2 oz)   SpO2 97%   BMI 29.21 kg/m²     Objective     Physical Exam  Constitutional:       General: She is not in acute distress.     Appearance: Normal appearance. She is well-developed. She is not toxic-appearing or diaphoretic.   HENT:      Head: Normocephalic and atraumatic.      Nose: Nose normal.   Eyes:      General:         Right eye: No discharge.         Left eye: No discharge.      Conjunctiva/sclera:      Right eye: Right conjunctiva is not injected.      Left eye: Left conjunctiva is not injected.      Pupils: Pupils are equal.      Right eye: Pupil is round.      Left eye: Pupil is round.   Neck:      Thyroid: No thyromegaly.      Vascular: No carotid bruit or JVD.   Cardiovascular:      Rate and Rhythm: Normal rate and regular rhythm. No extrasystoles are present.     Chest Wall: PMI is not displaced.      Pulses:           Radial pulses are 2+ on the right side and 2+ on the left side.        Femoral pulses are 2+ on the right side and 2+ on the left side.       Dorsalis pedis pulses are 2+ on the right side and 2+ on the left side.        Posterior tibial pulses are 2+ on the right side and 2+ on the left side.      Heart sounds: S1 normal and S2 normal.      No gallop.   Pulmonary:      Effort: Pulmonary effort is normal.      Breath sounds: Normal breath sounds.   Abdominal:      Palpations: Abdomen is soft.      Tenderness: There is no abdominal tenderness.   Musculoskeletal:      Cervical back: Neck supple.      Right lower leg: Normal. No swelling. No edema.      Left lower leg: Normal. No swelling. No " edema.   Lymphadenopathy:      Head:      Right side of head: No submandibular adenopathy.      Left side of head: No submandibular adenopathy.      Cervical: No cervical adenopathy.   Skin:     General: Skin is warm and dry.      Findings: No rash.      Nails: There is no clubbing.   Neurological:      General: No focal deficit present.      Mental Status: She is alert and oriented to person, place, and time. She is not disoriented.      Cranial Nerves: No cranial nerve deficit.   Psychiatric:         Attention and Perception: Attention and perception normal.         Mood and Affect: Mood and affect normal.         Speech: Speech normal.         Behavior: Behavior normal.         Thought Content: Thought content normal.         Cognition and Memory: Cognition and memory normal.         Judgment: Judgment normal.         Assessment and Plan     1. History of chest pain    2. Ventricular premature beats    3. History of pulmonary embolism    4. Hypercholesterolemia    5. Overweight    6. Obstructive sleep apnea    7. History of cholecystectomy    8. Gallbladder attack        Plan:     Chest Pain              6/7/2024: Chest pain after laparoscopic cholecystectomy.              6/8/2024: ECG: SR. Anterolateral ST-T wave changes.              6/8/2024: Echo: Normal left ventricular size and systolic function.EF 70%. Moderately dilated LA. Ascending aorta 4.2 cm. SPAP 39 mmHg.               6/8/2024: CTA Chest: The pulmonary trunk is dilated measuring 4 cm. Postoperative pneumoperitoneum related to recent cholecystectomy. Extensive subcutaneous emphysema seen along the right flank extending into the right lateral chest.              Troponin 0.311.              She was tender over right chest wall.              Doubt pain due to myocardial ischemia.              Elevated PA pressure and size of pulmonary trunk may possibly be explained by CARI.               On losartan 25 mg Q24.              Would do Stress Echo at a  later date outpatient.               2. Ventricular Premature Contractions              Known frequent VPCs.     3. History of Pulmonary Embolism              2001: After abdominal surgery.              Received warfarin for 3-6 months.     4. Hypercholesterolemia              At home been on rosuvastatin 5 mg Q24 and niacin.   4/4/2023: Chol 156. HDL 52. LDL 95. TG 59.   On rosuvastatin 5 mg Q24.   6/12/2024: Rosuvastatin 5 mg Q24 to be increased to rosuvastaton 10 mg Q24.              I would not favor niacin.     5. Overweight  6/10/2024: Weight 80 kg. BMI 28.     6. Obstructive Sleep Apnea  On CPAP.   Discussed importance of compliance.              7. History of Cholecystectomy              6/6/2024: Laparoscopic cholecystectomy.    8. Primary Care   Dr. Marie Steinberg.    F/u 6 months.    Wesley Patel M.D.

## 2024-06-19 ENCOUNTER — OFFICE VISIT (OUTPATIENT)
Dept: SURGERY | Facility: CLINIC | Age: 59
End: 2024-06-19
Attending: SPECIALIST
Payer: COMMERCIAL

## 2024-06-19 VITALS — SYSTOLIC BLOOD PRESSURE: 121 MMHG | HEART RATE: 57 BPM | DIASTOLIC BLOOD PRESSURE: 79 MMHG | OXYGEN SATURATION: 98 %

## 2024-06-19 DIAGNOSIS — R10.11 RIGHT UPPER QUADRANT ABDOMINAL PAIN: Primary | ICD-10-CM

## 2024-06-19 PROCEDURE — 3078F DIAST BP <80 MM HG: CPT | Mod: CPTII,S$GLB,, | Performed by: SPECIALIST

## 2024-06-19 PROCEDURE — 1160F RVW MEDS BY RX/DR IN RCRD: CPT | Mod: CPTII,S$GLB,, | Performed by: SPECIALIST

## 2024-06-19 PROCEDURE — 3074F SYST BP LT 130 MM HG: CPT | Mod: CPTII,S$GLB,, | Performed by: SPECIALIST

## 2024-06-19 PROCEDURE — 99999 PR PBB SHADOW E&M-EST. PATIENT-LVL IV: CPT | Mod: PBBFAC,,, | Performed by: SPECIALIST

## 2024-06-19 PROCEDURE — 3044F HG A1C LEVEL LT 7.0%: CPT | Mod: CPTII,S$GLB,, | Performed by: SPECIALIST

## 2024-06-19 PROCEDURE — 1159F MED LIST DOCD IN RCRD: CPT | Mod: CPTII,S$GLB,, | Performed by: SPECIALIST

## 2024-06-19 PROCEDURE — 99024 POSTOP FOLLOW-UP VISIT: CPT | Mod: S$GLB,,, | Performed by: SPECIALIST

## 2024-06-19 PROCEDURE — 4010F ACE/ARB THERAPY RXD/TAKEN: CPT | Mod: CPTII,S$GLB,, | Performed by: SPECIALIST

## 2024-06-19 RX ORDER — OXYCODONE AND ACETAMINOPHEN 5; 325 MG/1; MG/1
1 TABLET ORAL EVERY 6 HOURS PRN
Qty: 20 TABLET | Refills: 0 | Status: SHIPPED | OUTPATIENT
Start: 2024-06-19

## 2024-06-19 NOTE — PROGRESS NOTES
Status post laparoscopic cholecystectomy for symptomatic gallbladder disease on 06/06 2024    Subjective   Some incisional abdominal discomfort  no GI complaints      PE   Anicteric   Abdomen-soft, wounds healing without issue     Impression/plan   Surgically stable   RTC p.r.n.

## 2024-11-15 ENCOUNTER — TELEPHONE (OUTPATIENT)
Dept: OPTOMETRY | Facility: CLINIC | Age: 59
End: 2024-11-15
Payer: COMMERCIAL

## 2024-11-19 ENCOUNTER — OFFICE VISIT (OUTPATIENT)
Dept: OPTOMETRY | Facility: CLINIC | Age: 59
End: 2024-11-19
Payer: COMMERCIAL

## 2024-11-19 DIAGNOSIS — H52.12 MYOPIA WITH ASTIGMATISM AND PRESBYOPIA, LEFT: ICD-10-CM

## 2024-11-19 DIAGNOSIS — H43.393 VISUAL FLOATERS, BILATERAL: Primary | ICD-10-CM

## 2024-11-19 DIAGNOSIS — H52.4 HYPEROPIA WITH ASTIGMATISM AND PRESBYOPIA, RIGHT: ICD-10-CM

## 2024-11-19 DIAGNOSIS — H52.202 MYOPIA WITH ASTIGMATISM AND PRESBYOPIA, LEFT: ICD-10-CM

## 2024-11-19 DIAGNOSIS — H52.01 HYPEROPIA WITH ASTIGMATISM AND PRESBYOPIA, RIGHT: ICD-10-CM

## 2024-11-19 DIAGNOSIS — H52.4 MYOPIA WITH ASTIGMATISM AND PRESBYOPIA, LEFT: ICD-10-CM

## 2024-11-19 DIAGNOSIS — H52.201 HYPEROPIA WITH ASTIGMATISM AND PRESBYOPIA, RIGHT: ICD-10-CM

## 2024-11-19 DIAGNOSIS — H25.13 SENILE NUCLEAR SCLEROSIS, BILATERAL: ICD-10-CM

## 2024-11-19 PROCEDURE — 92015 DETERMINE REFRACTIVE STATE: CPT | Mod: S$GLB,,, | Performed by: OPTOMETRIST

## 2024-11-19 PROCEDURE — 4010F ACE/ARB THERAPY RXD/TAKEN: CPT | Mod: CPTII,S$GLB,, | Performed by: OPTOMETRIST

## 2024-11-19 PROCEDURE — 3044F HG A1C LEVEL LT 7.0%: CPT | Mod: CPTII,S$GLB,, | Performed by: OPTOMETRIST

## 2024-11-19 PROCEDURE — 92014 COMPRE OPH EXAM EST PT 1/>: CPT | Mod: S$GLB,,, | Performed by: OPTOMETRIST

## 2024-11-19 PROCEDURE — 1160F RVW MEDS BY RX/DR IN RCRD: CPT | Mod: CPTII,S$GLB,, | Performed by: OPTOMETRIST

## 2024-11-19 PROCEDURE — 99999 PR PBB SHADOW E&M-EST. PATIENT-LVL II: CPT | Mod: PBBFAC,,, | Performed by: OPTOMETRIST

## 2024-11-19 PROCEDURE — 1159F MED LIST DOCD IN RCRD: CPT | Mod: CPTII,S$GLB,, | Performed by: OPTOMETRIST

## 2024-11-25 ENCOUNTER — OFFICE VISIT (OUTPATIENT)
Dept: CARDIOLOGY | Facility: CLINIC | Age: 59
End: 2024-11-25
Attending: INTERNAL MEDICINE
Payer: COMMERCIAL

## 2024-11-25 VITALS
WEIGHT: 177.56 LBS | DIASTOLIC BLOOD PRESSURE: 68 MMHG | HEIGHT: 67 IN | SYSTOLIC BLOOD PRESSURE: 108 MMHG | OXYGEN SATURATION: 99 % | HEART RATE: 56 BPM | BODY MASS INDEX: 27.87 KG/M2

## 2024-11-25 DIAGNOSIS — E66.3 OVERWEIGHT: ICD-10-CM

## 2024-11-25 DIAGNOSIS — Z87.898 HISTORY OF CHEST PAIN: ICD-10-CM

## 2024-11-25 DIAGNOSIS — I49.3 VENTRICULAR PREMATURE BEATS: ICD-10-CM

## 2024-11-25 DIAGNOSIS — Z90.49 HISTORY OF CHOLECYSTECTOMY: ICD-10-CM

## 2024-11-25 DIAGNOSIS — Z86.711 HISTORY OF PULMONARY EMBOLISM: ICD-10-CM

## 2024-11-25 DIAGNOSIS — G47.33 OBSTRUCTIVE SLEEP APNEA: ICD-10-CM

## 2024-11-25 DIAGNOSIS — E78.00 HYPERCHOLESTEROLEMIA: ICD-10-CM

## 2024-11-25 PROCEDURE — 4010F ACE/ARB THERAPY RXD/TAKEN: CPT | Mod: CPTII,S$GLB,, | Performed by: INTERNAL MEDICINE

## 2024-11-25 PROCEDURE — 3078F DIAST BP <80 MM HG: CPT | Mod: CPTII,S$GLB,, | Performed by: INTERNAL MEDICINE

## 2024-11-25 PROCEDURE — 3044F HG A1C LEVEL LT 7.0%: CPT | Mod: CPTII,S$GLB,, | Performed by: INTERNAL MEDICINE

## 2024-11-25 PROCEDURE — 99999 PR PBB SHADOW E&M-EST. PATIENT-LVL III: CPT | Mod: PBBFAC,,, | Performed by: INTERNAL MEDICINE

## 2024-11-25 PROCEDURE — 3008F BODY MASS INDEX DOCD: CPT | Mod: CPTII,S$GLB,, | Performed by: INTERNAL MEDICINE

## 2024-11-25 PROCEDURE — 99214 OFFICE O/P EST MOD 30 MIN: CPT | Mod: S$GLB,,, | Performed by: INTERNAL MEDICINE

## 2024-11-25 PROCEDURE — 3074F SYST BP LT 130 MM HG: CPT | Mod: CPTII,S$GLB,, | Performed by: INTERNAL MEDICINE

## 2024-11-25 RX ORDER — ROSUVASTATIN CALCIUM 10 MG/1
10 TABLET, COATED ORAL DAILY
Qty: 90 TABLET | Refills: 3 | Status: SHIPPED | OUTPATIENT
Start: 2024-11-25

## 2024-11-25 RX ORDER — LOSARTAN POTASSIUM 25 MG/1
25 TABLET ORAL NIGHTLY
Qty: 90 TABLET | Refills: 3 | Status: SHIPPED | OUTPATIENT
Start: 2024-11-25

## 2024-11-25 RX ORDER — GABAPENTIN 300 MG/1
300 CAPSULE ORAL 3 TIMES DAILY
COMMUNITY

## 2024-11-25 NOTE — PROGRESS NOTES
Subjective     Estela Vázquez is a 59 y.o. female with hypercholesterolemia. She is overweight. She has obstructive sleep apnea. She had frequent ventricular premature contractions in the setting of normal systolic function. In 2001 she had pulmonary embolism a few days after she had undergone abdominal surgery. She said that initial tests revealed an anticardiolipid antibody but that this was no longer seen when testing was repeated. She came in for elective cholecystectomy on 6/6/2024. She was admitted for observation. She had some pain in the left side of her chest on 6/7/2024. On 6/8/2024 she has felt sharp right sided chest pain that gets worse with inspiration. She denied shortness of breath. On 6/8/2024 she had an echocardiogram that revealed normal left ventricular size and systolic function. The ejection fraction was 70%. The left atrium was moderately dilated. The ascending aorta appeared mildly enlarged being 4.2 cm. The systolic pulmonary artery pressure was 39 mmHg. On 6/8/2024 a computed tomography scan of her chest revealed the pulmonary trunk to be dilated measuring 4 cm. The postoperative pneumoperitoneum appeared related to recent cholecystectomy. There was extensive subcutaneous emphysema seen along the right flank extending into the right lateral chest.She came back to see me and wanted me to assume her cardiac care. No exertional chest pain or exertional shortness of breath. No weak spells. No issues with any of her prescribed medications. Feeling well overall.       Chest Pain   This is a new problem. The onset quality is sudden. The problem occurs constantly. The problem has been unchanged. The pain is at a severity of 3/10. The pain is mild. The quality of the pain is described as burning. Associated symptoms include palpitations. Pertinent negatives include no abdominal pain, back pain, claudication, cough, diaphoresis, dizziness, fever, headaches, hemoptysis, irregular heartbeat, leg  pain, malaise/fatigue, nausea, near-syncope, numbness, orthopnea, PND, shortness of breath, syncope, vomiting or weakness.   Her past medical history is significant for hyperlipidemia.   Pertinent negatives for past medical history include no diabetes and no muscle weakness.   Palpitations   Associated symptoms include chest pain. Pertinent negatives include no anxiety, chest fullness, coughing, diaphoresis, dizziness, fever, irregular heartbeat, malaise/fatigue, nausea, near-syncope, numbness, shortness of breath, syncope, vomiting or weakness.   Hyperlipidemia  She has no history of chronic renal disease, diabetes, hypothyroidism, liver disease or nephrotic syndrome. Associated symptoms include chest pain. Pertinent negatives include no focal sensory loss, focal weakness, leg pain, myalgias or shortness of breath.       Review of Systems   Constitutional: Negative for chills, diaphoresis, fever and malaise/fatigue.   HENT:  Negative for nosebleeds and tinnitus.    Eyes:  Negative for double vision, vision loss in left eye and vision loss in right eye.   Cardiovascular:  Positive for chest pain and palpitations. Negative for claudication, dyspnea on exertion, irregular heartbeat, leg swelling, near-syncope, orthopnea, paroxysmal nocturnal dyspnea and syncope.   Respiratory:  Negative for cough, hemoptysis, shortness of breath and wheezing.    Endocrine: Negative for cold intolerance and heat intolerance.   Hematologic/Lymphatic: Negative for bleeding problem. Does not bruise/bleed easily.   Skin:  Negative for color change and rash.   Musculoskeletal:  Negative for back pain, falls, muscle weakness and myalgias.   Gastrointestinal:  Negative for abdominal pain, diarrhea, dysphagia, heartburn, hematemesis, hematochezia, hemorrhoids, jaundice, melena, nausea and vomiting.   Genitourinary:  Negative for dysuria and hematuria.   Neurological:  Negative for dizziness, focal weakness, headaches, light-headedness, loss of  balance, numbness, tremors, vertigo and weakness.   Psychiatric/Behavioral:  Negative for altered mental status, depression and memory loss. The patient is not nervous/anxious.    Allergic/Immunologic: Negative for hives and persistent infections.       Current Outpatient Medications on File Prior to Visit   Medication Sig Dispense Refill    biotin 5 mg Tab Take 5 mg by mouth.      calcium carbonate/vitamin D3 (VITAMIN D-3 ORAL) Take by mouth once daily.      coenzyme Q10 (CO Q-10) 100 mg capsule Take 100 mg by mouth once daily.      dicyclomine (BENTYL) 10 MG capsule Take 10 mg by mouth every 6 (six) hours as needed.      estradioL (ESTRACE) 0.01 % (0.1 mg/gram) vaginal cream       fluticasone propionate (FLONASE) 50 mcg/actuation nasal spray       gabapentin (NEURONTIN) 300 MG capsule Take 300 mg by mouth 3 (three) times daily.      L. acidophilus/Bifid. animalis (PROBIOTIC) 5 billion cell CpSP Take by mouth once daily.      losartan (COZAAR) 25 MG tablet Take 1 tablet (25 mg total) by mouth every evening. 90 tablet 3    multivitamin (THERAGRAN) per tablet Take 1 tablet by mouth once daily.      omega-3 fatty acids/fish oil (FISH OIL-OMEGA-3 FATTY ACIDS) 300-1,000 mg capsule Take 2 g by mouth.      psyllium husk (METAMUCIL ORAL) Take by mouth once daily.      riboflavin, vitamin B2, (VITAMIN B-2 ORAL) Take by mouth once daily.      rosuvastatin (CRESTOR) 10 MG tablet Take 1 tablet (10 mg total) by mouth once daily. 90 tablet 3    aspirin (ECOTRIN) 81 MG EC tablet Take 1 tablet (81 mg total) by mouth once daily. (Patient not taking: Reported on 11/25/2024) 90 tablet 3    meloxicam (MOBIC) 15 MG tablet Take 1 tablet (15 mg total) by mouth once daily. (Patient not taking: Reported on 11/25/2024) 30 tablet 3    niacinamide 500 mg Tab Take 500 mg by mouth once daily. (Patient not taking: Reported on 11/25/2024)      ondansetron (ZOFRAN-ODT) 4 MG TbDL Take 1 tablet (4 mg total) by mouth 2 (two) times daily. (Patient not  "taking: Reported on 11/25/2024) 12 tablet 0    oxyCODONE-acetaminophen (PERCOCET) 5-325 mg per tablet Take 1 tablet by mouth every 6 (six) hours as needed for Pain. 20 tablet 0    oxyCODONE-acetaminophen (PERCOCET) 7.5-325 mg per tablet Take 1 tablet by mouth every 4 (four) hours as needed for Pain. (Patient not taking: Reported on 11/25/2024) 20 tablet 0     No current facility-administered medications on file prior to visit.        /68   Pulse (!) 56   Ht 5' 7" (1.702 m)   Wt 80.6 kg (177 lb 9.3 oz)   SpO2 99%   BMI 27.81 kg/m²     Objective     Physical Exam  Constitutional:       General: She is not in acute distress.     Appearance: Normal appearance. She is well-developed. She is not toxic-appearing or diaphoretic.   HENT:      Head: Normocephalic and atraumatic.      Nose: Nose normal.   Eyes:      General:         Right eye: No discharge.         Left eye: No discharge.      Conjunctiva/sclera:      Right eye: Right conjunctiva is not injected.      Left eye: Left conjunctiva is not injected.      Pupils: Pupils are equal.      Right eye: Pupil is round.      Left eye: Pupil is round.   Neck:      Thyroid: No thyromegaly.      Vascular: No carotid bruit or JVD.   Cardiovascular:      Rate and Rhythm: Normal rate and regular rhythm. No extrasystoles are present.     Chest Wall: PMI is not displaced.      Pulses:           Radial pulses are 2+ on the right side and 2+ on the left side.        Femoral pulses are 2+ on the right side and 2+ on the left side.       Dorsalis pedis pulses are 2+ on the right side and 2+ on the left side.        Posterior tibial pulses are 2+ on the right side and 2+ on the left side.      Heart sounds: S1 normal and S2 normal.      No gallop.   Pulmonary:      Effort: Pulmonary effort is normal.      Breath sounds: Normal breath sounds.   Abdominal:      Palpations: Abdomen is soft.      Tenderness: There is no abdominal tenderness.   Musculoskeletal:      Cervical back: " Neck supple.      Right lower leg: Normal. No swelling. No edema.      Left lower leg: Normal. No swelling. No edema.   Lymphadenopathy:      Head:      Right side of head: No submandibular adenopathy.      Left side of head: No submandibular adenopathy.      Cervical: No cervical adenopathy.   Skin:     General: Skin is warm and dry.      Findings: No rash.      Nails: There is no clubbing.   Neurological:      General: No focal deficit present.      Mental Status: She is alert and oriented to person, place, and time. She is not disoriented.      Cranial Nerves: No cranial nerve deficit.   Psychiatric:         Attention and Perception: Attention and perception normal.         Mood and Affect: Mood and affect normal.         Speech: Speech normal.         Behavior: Behavior normal.         Thought Content: Thought content normal.         Cognition and Memory: Cognition and memory normal.         Judgment: Judgment normal.         Assessment and Plan     1. History of chest pain    2. Ventricular premature beats    3. History of pulmonary embolism    4. Hypercholesterolemia    5. Overweight    6. Obstructive sleep apnea    7. History of cholecystectomy        Plan:     History of Chest Pain              6/7/2024: Chest pain after laparoscopic cholecystectomy.              6/8/2024: ECG: SR. Anterolateral ST-T wave changes.              6/8/2024: Echo: Normal left ventricular size and systolic function. EF 70%. Moderately dilated LA. Ascending aorta 4.2 cm. SPAP 39 mmHg.               6/8/2024: CTA Chest: The pulmonary trunk is dilated measuring 4 cm. Postoperative pneumoperitoneum related to recent cholecystectomy. Extensive subcutaneous emphysema seen along the right flank extending into the right lateral chest.              Troponin 0.311.              She was tender over right chest wall.              Doubt pain due to myocardial ischemia.              Elevated PA pressure and size of pulmonary trunk may possibly  be explained by CARI.               On losartan 25 mg Q24.              11/25/2024: Do Stress Echo.               2. Ventricular Premature Contractions              Known frequent VPCs.     3. History of Pulmonary Embolism              2001: After abdominal surgery.              Received warfarin for 3-6 months.     4. Hypercholesterolemia              At home been on rosuvastatin 5 mg Q24 and niacin.   4/4/2023: Chol 156. HDL 52. LDL 95. TG 59.   6/12/2024: Rosuvastatin 5 mg Q24 was increased to rosuvastaton 10 mg Q24.   On rosuvastatin 10 mg Q24.   7/11/2024: Chol 142. HDL 68. LDL 61. TG 44.   On rosuvastatin 10 mg Q24.              Very favorable lipid panel.     5. Overweight  6/10/2024: Weight 80 kg. BMI 28.     6. Obstructive Sleep Apnea  On CPAP.   Discussed importance of compliance.              7. History of Cholecystectomy              6/6/2024: Laparoscopic cholecystectomy.    8. Primary Care   Dr. Marie Steinberg.    F/u 6 months.    Wesley Patel M.D.

## 2024-12-03 ENCOUNTER — HOSPITAL ENCOUNTER (OUTPATIENT)
Dept: CARDIOLOGY | Facility: OTHER | Age: 59
Discharge: HOME | End: 2024-12-03
Attending: INTERNAL MEDICINE
Payer: COMMERCIAL

## 2024-12-03 VITALS
SYSTOLIC BLOOD PRESSURE: 100 MMHG | WEIGHT: 177 LBS | HEART RATE: 57 BPM | BODY MASS INDEX: 27.78 KG/M2 | HEIGHT: 67 IN | DIASTOLIC BLOOD PRESSURE: 71 MMHG

## 2024-12-03 DIAGNOSIS — Z87.898 HISTORY OF CHEST PAIN: ICD-10-CM

## 2024-12-03 LAB
BSA FOR ECHO PROCEDURE: 1.95 M2
CV ECHO LV RWT: 0.22 CM
CV STRESS BASE HR: 57 BPM
DIASTOLIC BLOOD PRESSURE: 71 MMHG
ECHO LV POSTERIOR WALL: 0.7 CM (ref 0.6–1.1)
EJECTION FRACTION: 60 %
FRACTIONAL SHORTENING: 32.3 % (ref 28–44)
INTERVENTRICULAR SEPTUM: 0.7 CM (ref 0.6–1.1)
LEFT INTERNAL DIMENSION IN SYSTOLE: 4.4 CM (ref 2.1–4)
LEFT VENTRICLE DIASTOLIC VOLUME INDEX: 110.96 ML/M2
LEFT VENTRICLE DIASTOLIC VOLUME: 213.05 ML
LEFT VENTRICLE MASS INDEX: 95 G/M2
LEFT VENTRICLE SYSTOLIC VOLUME INDEX: 45.8 ML/M2
LEFT VENTRICLE SYSTOLIC VOLUME: 87.99 ML
LEFT VENTRICULAR INTERNAL DIMENSION IN DIASTOLE: 6.5 CM (ref 3.5–6)
LEFT VENTRICULAR MASS: 182.3 G
LVED V (TEICH): 213.05 ML
LVES V (TEICH): 87.99 ML
OHS CV CPX 1 MINUTE RECOVERY HEART RATE: 100 BPM
OHS CV CPX 85 PERCENT MAX PREDICTED HEART RATE MALE: 136
OHS CV CPX ESTIMATED METS: 11
OHS CV CPX MAX PREDICTED HEART RATE: 160
OHS CV CPX PATIENT IS FEMALE: 1
OHS CV CPX PATIENT IS MALE: 0
OHS CV CPX PEAK DIASTOLIC BLOOD PRESSURE: 98 MMHG
OHS CV CPX PEAK HEAR RATE: 142 BPM
OHS CV CPX PEAK RATE PRESSURE PRODUCT: ABNORMAL
OHS CV CPX PEAK SYSTOLIC BLOOD PRESSURE: 169 MMHG
OHS CV CPX PERCENT MAX PREDICTED HEART RATE ACHIEVED: 93
OHS CV CPX RATE PRESSURE PRODUCT PRESENTING: 5700
POST STRESS EJECTION FRACTION: 80 %
STRESS ECHO POST EXERCISE DUR MIN: 9 MINUTES
STRESS ECHO POST EXERCISE DUR SEC: 22 SECONDS
STRESS ST DEPRESSION: 1 MM
SYSTOLIC BLOOD PRESSURE: 100 MMHG
Z-SCORE OF LEFT VENTRICULAR DIMENSION IN END DIASTOLE: 1.82
Z-SCORE OF LEFT VENTRICULAR DIMENSION IN END SYSTOLE: 2.19

## 2024-12-03 PROCEDURE — 93351 STRESS TTE COMPLETE: CPT | Mod: 26,,, | Performed by: INTERNAL MEDICINE

## 2024-12-03 PROCEDURE — 93351 STRESS TTE COMPLETE: CPT

## 2024-12-12 NOTE — HPI
Ms. Vázquez is a 58 year old woman with history of pulmonary embolism (2001 after abdominal surgery), PVCs, chronic diastolic chf, irritable bowel syndrome (diarrhea predominant), prior diverticulitis and hyperlipidemia who was admitted by Dr. Murphy for planned cholecystectomy.  Her surgery was performed 6/7.  After her surgery she had bilateral chest pain which was felt to be secondary to CO2 insuffusion during her surgery.  The left sided chest pain has completely resolved, but the right sided chest pain persists.  This morning the right chest pain was severe, but presently it is very slight.  She states the pain is non-radiating and is worse with inspiration.  She denies other alleviating and exacerbating factors.  She notes it feels somewhat similar to the pain she had with her prior pulmonary embolism with the exception that her current pain does not radiate to her back.  She denies shortness of breath and palpitations.  She had nausea and chills this morning but no vomiting.  Her abdominal post-surgical pain is as she expected to be and is mild.  She is passing flatus but has had no bowel movements yet.      
12-Dec-2024 14:08

## 2024-12-27 ENCOUNTER — PATIENT MESSAGE (OUTPATIENT)
Dept: OPTOMETRY | Facility: CLINIC | Age: 59
End: 2024-12-27
Payer: COMMERCIAL

## 2025-02-26 NOTE — PLAN OF CARE
[facility]  Physical Therapy Initial Evaluation    Date: 2020   Name: Estela Vázquez  Clinic Number: [id number]    Therapy Diagnosis:   Encounter Diagnoses   Name Primary?    Left hip pain     Acute hip pain, left Yes    Muscle weakness of lower extremity     Gait difficulty      Physician: Nika Barreto MD    Physician Orders: PT Eval and Treat L hip pain  Medical Diagnosis from Referral: M25.552 (ICD-10-CM) - Left hip pain  Evaluation Date: 2020  Authorization Period Expiration: 20  Plan of Care Expiration: 3/21/21  Visit # / Visits authorized:     Time In: 745  Time Out: 830  Total Appointment Time (timed & untimed codes): 45 minutes       DATE OF PROCEDURE: 11/15/2018  PROCEDURE:    Left:  1. Hip arthroscopic trochanteric bursectomy   2. Hip gluteal sling release (CPT 57462)    Precautions: Standard    Subjective   Date of onset: DOS 11/15/2018  istory of current condition - Estela reports: she had surgery on 11/15/2018. Pt reports some relief following the procedure and completing physical therapy. Pt reports aggravating factors to be laying on L side, sit<>stand transfers from floor or chair, ascending stairs (descending not too bad), and the first few steps while walking. Pt reports relieving factors to be keeping it moving/loose, soft tissue work to IT band and doing SL hip abduction/ clamshells. Pt would like to be able to perform transfers, exercise, walk and sleep comfortably without pain.    Medical History:   Past Medical History:   Diagnosis Date    Arrhythmia     bigeminy & trigeminy     followed cardiologist    Pulmonary embolism     Bilateral       Surgical History:   Estela Vázquez  has a past surgical history that includes Shoulder arthroscopy; Hysterectomy (Bilateral); Laparoscopy w/ mini-laparotomy (Right);  section (N/A); and Appendectomy.    Medications:   Estela has a current medication list which includes the following  See Scanned Documents.   prescription(s): hydrocodone-acetaminophen, losartan, meloxicam, omeprazole, promethazine, and tramadol.    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  Prior Level of Function: Pt independent with all ADLs, job responsibilities and participating in regular physical activity  Current Level of Function: Pt limited with ADL's, tolerance to sitting at desk and unable to participate in recreational exercise at [facility]    Pain:  Current 0/10, worst 8/10, best 0/10   Location: { left hip(s)  Description: Aching, Dull and tight   Aggravating Factors: Lifting and Getting out of bed/chair  Easing Factors: massage, ice and rest    Pts goals: pain free with sit<>stand, walking, hiking    Objective       Hip Range of Motion(*=pain):WNL bilat (hip extension to 5 bilat), pain reported at end range hip IR and adduction on R    Lower Extremity Strength  Right LE  Left LE    Quadriceps: 5/5 Quadriceps: 5/5   Hamstrings: 4+/5 Hamstrings: 4+/5   Iliopsoas: 4/5 Iliopsoas: 4-/5   Hip extension:  3+/5 Hip extension: 3/5 P!   Hip abduction: 4-/5 Hip abduction: 4-/5 P!   Hip ER: 4-/5 Hip ER: 3+/5   Hip IR: 3+/5 Hip IR: 3+/5     Functional Tests:   Bridge Test: + bilat L>R  SL Squat Test: R: 5 pain free ; L 5 reduced hip hinge vs R, pain free  SLS EO: 20 sec bilat  DL squat: pain reported in L hip  Hip hinge: no pain reported    Joint Mobility: restricted posterior/ inferior glide on L hip    Palpation: Pt TTP to IT band, hip flexor tendon and glute med    Limitation/Restriction for FOTO hip Survey    Therapist reviewed FOTO scores for Estela Vázquez on 12/21/2020.   FOTO documents entered into EPIC - see Media section.         TREATMENT   Treatment Time In: 8:11  Treatment Time Out: 8:30  Total  "Treatment time (time-based codes) separate from Evaluation: 19 minutes    Estela received therapeutic exercises to develop strength, endurance, ROM, flexibility, posture and core stabilization for 19 minutes including:    Supine clamshell isometric 10" hold x10  Modified dead bug 2x10  Hip hinge 2x20  Standing OTB hip abduction 2x10  *Pt reports reduction in SL hip abduction pain following clamshell isometrics    Home Exercises and Patient Education Provided    Education provided:   - Pt educated on activity modification, pathoanatomy of hip pain,     Written Home Exercises Provided: yes.  Exercises were reviewed and Estela Vázquez was able to demonstrate them prior to the end of the session.  Estela Vázquez demonstrated good  understanding of the education provided.     See EMR under Patient Instructions for exercisesprovided 12/21/2020.    Assessment   Estela is a 55 y.o. female referred to outpatient Physical Therapy with a medical diagnosis of L hip pain. Pt presents with decreased strength, decreased ROM, decreased flexibility, faulty posture, and increased pain. Due to impairments, pt is unable to perform transfers, hike or tolerate ADL's without pain.    Pt prognosis is Good.   Pt will benefit from skilled outpatient Physical Therapy to address the deficits stated above and in the chart below, provide pt/family education, and to maximize pt's level of independence.     Plan of care discussed with patient: Yes  Pt's spiritual, cultural and educational needs considered and patient is agreeable to the plan of care and goals as stated below:     Anticipated Barriers for therapy: n/a    Medical Necessity is demonstrated by the following  History  Co-morbidities and personal factors that may impact the plan of care Co-morbidities:   See medical chart    Personal Factors:   no deficits     low   Examination  Body Structures and Functions, activity limitations and participation restrictions that may impact the " plan of care Body Regions:   lower extremities    Body Systems:    ROM  strength  balance  gait  transfers    Participation Restrictions:   covid-19    Activity limitations:   Learning and applying knowledge  no deficits    General Tasks and Commands  no deficits    Communication  no deficits    Mobility  lifting and carrying objects  walking    Self care  no deficits    Domestic Life  no deficits    Interactions/Relationships  no deficits    Life Areas  no deficits    Community and Social Life  no deficits         low   Clinical Presentation stable and uncomplicated low   Decision Making/ Complexity Score: low     Goals:  Short Term Goals: 3 weeks   1. Pt will be independent with HEP and report compliance at least 4 days/week  2. Pt will be able to tolerate 20 minutes of sitting and transfer to standing with less than 3/10 pain  3. Pt will be able to ascend 1 flight of stairs reporting less than 3/10 pain    Long Term Goals: 12 weeks   1. Pt will demonstrate hip MMT of at least 4+/5 in order to tolerate functional activities  2. Pt will be able to perform 10 sit<>stands reporting no pain  3. Pt will be able to hike at PLOF reporting no pain.  4. Pt will be able to perform all transfers reporting no pain    Plan   Plan of care Certification: 12/21/2020 to 3/21/21.    Outpatient Physical Therapy 1 times weekly for 12 weeks to include the following interventions: Cervical/Lumbar Traction, Electrical Stimulation TENS/NMES, Gait Training, Manual Therapy, Moist Heat/ Ice, Neuromuscular Re-ed, Patient Education, Self Care, Therapeutic Activites, Therapeutic Exercise and Dry Needling     Pb Sanabria, PT

## 2025-03-29 NOTE — PROGRESS NOTES
"[facility]   Physical Therapy Treatment Note     Name: Estela Vázquez  Clinic Number: [id number]    Therapy Diagnosis:   Encounter Diagnoses   Name Primary?    Chronic right-sided low back pain without sciatica Yes    Radiculitis of left cervical region      Physician: Semaj Cleveland MD    Visit Date: 12/7/2022    Physician Orders: PT Eval and Treat  per MD for shoulder pain = Scapular dyskinesia, Scapular stabilization - [place] protocol, 1-3x/week x 6-8 weeks with HEP  Medical Diagnosis from Referral: M47.816 (ICD-10-CM) - Lumbar spondylosis  Additional Dx by Nika Barreto MD: M25.512 (ICD-10-CM) - Left shoulder pain, unspecified chronicity      Evaluation Date: 11/28/2022  Authorization Period Expiration: 12/31/2022  Plan of Care Expiration: 2/28/2023  Progress Note Due: 12/28/2022  Visit # / Visits authorized: 4/ 21   FOTO: 1/3 on 11/28/22     Precautions: Standard    PTA Visit #: 0/5     Time In: 1000  Time Out: 1045  Total Billable Time: 45 minutes    SUBJECTIVE     Pt reports: back is still feeling improved, but neck is hurting with symptoms going into UE. She says after needling symptoms receded to elbow, but have gone back down to wrist. Pain is low today, but she was hurting a lot (8/10) yesterday afternoon.   She was compliant with home exercise program.  Response to previous treatment: fair  Functional change: none    Pain: 3/10 (neck 3/10)  Location: right low back and hip (glute), L neck and UE    OBJECTIVE     Objective Measures updated at progress report unless specified.     Treatment     Estela received the treatments listed below:      therapeutic exercises to develop strength, endurance, ROM, flexibility, posture, and core stabilization for 30 minutes including:    **Exercises in BOLD performed today**    UQ:  Seated scap squeezes x 20 x 5" holds  Seated UT/LS stretches 3 x 15" holds ea  Radial nerve glides 2x 10  Ulnar nerve glides 2x 10  1/2 foam   Pec stretch x " "2'   Abd <> bear hugs x 10   Neymar OH flex x 10   SA punch x 10    supine chin tucks x 20 x 5" holds  chin tuck with neymar ER x 20 x GTB  chin tuck with neymar Abd x 20 x GTB  open books x 10 NEYMAR    Next visit (?): prone scapular series    LQ:   LTR 3 x 20" static holds NEYMAR  PPT 10 x 10"  PPT + glute set into bridge initiation x 10  SL reverse clams (pilates block between knees) 10 x 10"    Next visit (?): piriformis stretch, supine hip flexor stretch, SLR with pilates ring press, reverse crunch w/ PB, SL clams    manual therapy techniques: Joint mobilizations, Manual traction, Myofacial release, Soft tissue Mobilization, and Friction Massage were applied to the: neck, back for 15 minutes, includin min x Spring PAs T4-10, rib unilat/neymar rotational PA glides T4-8  15 min x Application of TDN: Pt educated on benefits and potential side effects of dry needling. Educated pt on benefits, precautions, side effects following TDN. Educated pt to use heat following treatment sessions if pt is experiencing pain or soreness. Pt verbalized good understanding of education.  Pt signed written consent to dry needling. Pt gave verbal consent for DN    Pt received dry needling to the below listed muscles using 30 and 40 mm needles.  L UT  L LS  L C3, 5, 7  L suboccipitals (GB20)  Winding performed every 5 minutes during treatment.      neuromuscular re-education activities to improve: Balance, Coordination, Kinesthetic, Sense, Proprioception, and Posture for 00 minutes. The following activities were included:  None today.  Core activation series next visit?    therapeutic activities to improve functional performance for 00  minutes, including:  None today.      Modalities: MHP to CSP simultaneous with supine therex x 10 min    Patient Education and Home Exercises     Home Exercises Provided and Patient Education Provided     Education provided:   - updated HEP 2022    Written Home Exercises Provided: Patient instructed to cont " prior HEP. Exercises were reviewed and Estela was able to demonstrate them prior to the end of the session.  Estela demonstrated good  understanding of the education provided. See EMR under Patient Instructions for exercises provided during therapy sessions    ASSESSMENT     Good tolerance to treatment today. Pt with report of increased neck and L UE symptoms with chin tucks initially, improves with instruction to reduce ROM. Pt reports relief with band exercises and open books, issued for HEP per her request. Dry needling continued today with pt's consent. Good soft tissue response to dry needling evident by increased grasp with unilateral winding at all insertion points. Winding performed every 5 minutes during treatment. No adverse effects following treatment. Pt reports resolution of radicular symptoms on leaving clinic today.       Estela Is progressing well towards her goals.   Pt prognosis is Good.     Pt will continue to benefit from skilled outpatient physical therapy to address the deficits listed in the problem list box on initial evaluation, provide pt/family education and to maximize pt's level of independence in the home and community environment.     Pt's spiritual, cultural and educational needs considered and pt agreeable to plan of care and goals.     Anticipated barriers to physical therapy: standard, chronicity of sx, multiple medical diagnoses, Hx of L ankle surgery, L shoulder surgery     Goals: updated 12/7/2022   Short Term Goals (6 Weeks):   1. Pt will report 20% reduction in pain of the lumbar spine and BLE for ease with ADL's  2. PT will demonstrate improved trunk strength by a half grade in for ease with upright posture during standing activities.  3. Pt will demonstrate improved lumbar spine ROM in all directions by a half grade for ease with bending activities.   4. Pt to demonstrate improved functional ability with FOTO limitation <=30% disability.  5. Pt to demonstrate improved  cervical  ROM by 10% to allow pt to perform self care activities with increased ease.  6. Pt to demonstrate improved postural strength by a half grade to allow improved ADLs with increased ease.      Long Term Goals (12 Weeks):   1. Pt will report being independent with HEP for maintenance of improvements gained during therapy sessions  2. PT will report 50% reduction of pain of the back and BLE for ease with dressing and grooming activities.   3. Pt will demonstrate trunk and extremity strength to >=4+/5 without the provocation of pain for ease with household chores  4. Pt will demonstrate appropriate upright posture without external cueing for ease with work related activities.   5. Pt to demonstrate improved functional ability with FOTO limitation <=20% disability.  6. Pt to demonstrate improved cervical ROM to WNL, R=L, to allow pt to perform self care with increased ease.  7. Pt will demonstrate >=4+/5 strength within the L scapula and shoulder for ease with house hold chores    PLAN     Continue with POC for neural mobility, flexibility, trunk strength/stabilization.    Meaghan Prado, PT          My signature below certifies that the above stated patient is homebound and upon completion of the Face-To-Face encounter, has the need for intermittent skilled nursing, physical therapy and/or speech or occupational therapy services in their home for their current diagnosis as outlined in their initial plan of care. These services will continue to be monitored by myself or another physician.

## 2025-06-10 ENCOUNTER — OFFICE VISIT (OUTPATIENT)
Dept: CARDIOLOGY | Facility: CLINIC | Age: 60
End: 2025-06-10
Attending: INTERNAL MEDICINE
Payer: COMMERCIAL

## 2025-06-10 VITALS
OXYGEN SATURATION: 96 % | HEART RATE: 62 BPM | HEIGHT: 67 IN | WEIGHT: 192.44 LBS | SYSTOLIC BLOOD PRESSURE: 115 MMHG | BODY MASS INDEX: 30.2 KG/M2 | DIASTOLIC BLOOD PRESSURE: 56 MMHG

## 2025-06-10 DIAGNOSIS — E66.3 OVERWEIGHT: ICD-10-CM

## 2025-06-10 DIAGNOSIS — I49.3 VENTRICULAR PREMATURE BEATS: ICD-10-CM

## 2025-06-10 DIAGNOSIS — E78.00 HYPERCHOLESTEROLEMIA: ICD-10-CM

## 2025-06-10 DIAGNOSIS — Z86.711 HISTORY OF PULMONARY EMBOLISM: ICD-10-CM

## 2025-06-10 DIAGNOSIS — Z87.898 HISTORY OF CHEST PAIN: ICD-10-CM

## 2025-06-10 DIAGNOSIS — G47.33 OBSTRUCTIVE SLEEP APNEA: ICD-10-CM

## 2025-06-10 DIAGNOSIS — I77.810 ASCENDING AORTA DILATATION: ICD-10-CM

## 2025-06-10 DIAGNOSIS — Z90.49 HISTORY OF CHOLECYSTECTOMY: ICD-10-CM

## 2025-06-10 PROCEDURE — 99999 PR PBB SHADOW E&M-EST. PATIENT-LVL IV: CPT | Mod: PBBFAC,,, | Performed by: INTERNAL MEDICINE

## 2025-06-10 RX ORDER — ROSUVASTATIN CALCIUM 10 MG/1
10 TABLET, COATED ORAL DAILY
Qty: 90 TABLET | Refills: 3 | Status: SHIPPED | OUTPATIENT
Start: 2025-06-10

## 2025-06-10 RX ORDER — LOSARTAN POTASSIUM 25 MG/1
25 TABLET ORAL NIGHTLY
Qty: 90 TABLET | Refills: 3 | Status: SHIPPED | OUTPATIENT
Start: 2025-06-10

## 2025-06-10 NOTE — PROGRESS NOTES
Subjective:      Estela Vázquez is a 59 y.o. female with hypercholesterolemia. She is overweight. She has obstructive sleep apnea. She had frequent ventricular premature contractions in the setting of normal systolic function. In 2001 she had pulmonary embolism a few days after she had undergone abdominal surgery. She said that initial tests revealed an anticardiolipid antibody but that this was no longer seen when testing was repeated. She came in for elective cholecystectomy on 6/6/2024. She was admitted for observation. She had some pain in the left side of her chest on 6/7/2024. On 6/8/2024 she has felt sharp right sided chest pain that gets worse with inspiration. She denied shortness of breath. On 6/8/2024 she had an echocardiogram that revealed normal left ventricular size and systolic function. The ejection fraction was 70%. The left atrium was moderately dilated. The ascending aorta appeared mildly enlarged being 4.2 cm. The systolic pulmonary artery pressure was 39 mmHg. On 6/8/2024 a computed tomography scan of her chest revealed the pulmonary trunk to be dilated measuring 4 cm. The postoperative pneumoperitoneum appeared related to recent cholecystectomy. There was extensive subcutaneous emphysema seen along the right flank extending into the right lateral chest.She came back to see me and wanted me to assume her cardiac care. No exertional chest pain or exertional shortness of breath. No weak spells. No issues with any of her prescribed medications. Feeling well overall.    Chest Pain   This is a new problem. The onset quality is sudden. The problem occurs constantly. The problem has been unchanged. The pain is at a severity of 3/10. The pain is mild. The quality of the pain is described as burning. Associated symptoms include palpitations. Pertinent negatives include no abdominal pain, back pain, claudication, cough, diaphoresis, dizziness, fever, headaches, hemoptysis, irregular heartbeat, leg  pain, malaise/fatigue, nausea, near-syncope, numbness, orthopnea, PND, shortness of breath, syncope, vomiting or weakness.   Her past medical history is significant for hyperlipidemia.   Pertinent negatives for past medical history include no diabetes and no muscle weakness.   Palpitations   Associated symptoms include chest pain. Pertinent negatives include no anxiety, chest fullness, coughing, diaphoresis, dizziness, fever, irregular heartbeat, malaise/fatigue, nausea, near-syncope, numbness, shortness of breath, syncope, vomiting or weakness.   Hyperlipidemia  She has no history of chronic renal disease, diabetes, hypothyroidism, liver disease or nephrotic syndrome. Associated symptoms include chest pain. Pertinent negatives include no focal sensory loss, focal weakness, leg pain, myalgias or shortness of breath.     Review of Systems   Constitutional: Negative for chills, diaphoresis, fever and malaise/fatigue.   HENT:  Negative for nosebleeds and tinnitus.    Eyes:  Negative for double vision, vision loss in left eye and vision loss in right eye.   Cardiovascular:  Positive for chest pain and palpitations. Negative for claudication, dyspnea on exertion, irregular heartbeat, leg swelling, near-syncope, orthopnea, paroxysmal nocturnal dyspnea and syncope.   Respiratory:  Negative for cough, hemoptysis, shortness of breath and wheezing.    Endocrine: Negative for cold intolerance and heat intolerance.   Hematologic/Lymphatic: Negative for bleeding problem. Does not bruise/bleed easily.   Skin:  Negative for color change and rash.   Musculoskeletal:  Negative for back pain, falls, muscle weakness and myalgias.   Gastrointestinal:  Negative for abdominal pain, diarrhea, dysphagia, heartburn, hematemesis, hematochezia, hemorrhoids, jaundice, melena, nausea and vomiting.   Genitourinary:  Negative for dysuria and hematuria.   Neurological:  Negative for dizziness, focal weakness, headaches, light-headedness, loss of  balance, numbness, tremors, vertigo and weakness.   Psychiatric/Behavioral:  Negative for altered mental status, depression and memory loss. The patient is not nervous/anxious.    Allergic/Immunologic: Negative for hives and persistent infections.     Current Outpatient Medications on File Prior to Visit   Medication Sig Dispense Refill    biotin 5 mg Tab Take 5 mg by mouth.      calcium carbonate/vitamin D3 (VITAMIN D-3 ORAL) Take by mouth once daily.      coenzyme Q10 (CO Q-10) 100 mg capsule Take 100 mg by mouth once daily.      dicyclomine (BENTYL) 10 MG capsule Take 10 mg by mouth every 6 (six) hours as needed.      estradioL (ESTRACE) 0.01 % (0.1 mg/gram) vaginal cream       fluticasone propionate (FLONASE) 50 mcg/actuation nasal spray       gabapentin (NEURONTIN) 300 MG capsule Take 300 mg by mouth 3 (three) times daily.      L. acidophilus/Bifid. animalis (PROBIOTIC) 5 billion cell CpSP Take by mouth once daily.      losartan (COZAAR) 25 MG tablet Take 1 tablet (25 mg total) by mouth every evening. 90 tablet 3    multivitamin (THERAGRAN) per tablet Take 1 tablet by mouth once daily.      psyllium husk (METAMUCIL ORAL) Take by mouth once daily.      rosuvastatin (CRESTOR) 10 MG tablet Take 1 tablet (10 mg total) by mouth once daily. 90 tablet 3    meloxicam (MOBIC) 15 MG tablet Take 1 tablet (15 mg total) by mouth once daily. (Patient not taking: Reported on 11/25/2024) 30 tablet 3    omega-3 fatty acids/fish oil (FISH OIL-OMEGA-3 FATTY ACIDS) 300-1,000 mg capsule Take 2 g by mouth. (Patient not taking: Reported on 6/10/2025)      ondansetron (ZOFRAN-ODT) 4 MG TbDL Take 1 tablet (4 mg total) by mouth 2 (two) times daily. (Patient not taking: Reported on 6/12/2024) 12 tablet 0    oxyCODONE-acetaminophen (PERCOCET) 5-325 mg per tablet Take 1 tablet by mouth every 6 (six) hours as needed for Pain. 20 tablet 0    oxyCODONE-acetaminophen (PERCOCET) 7.5-325 mg per tablet Take 1 tablet by mouth every 4 (four) hours  "as needed for Pain. (Patient not taking: Reported on 11/25/2024) 20 tablet 0    riboflavin, vitamin B2, (VITAMIN B-2 ORAL) Take by mouth once daily. (Patient not taking: Reported on 6/10/2025)       No current facility-administered medications on file prior to visit.      Objective:     BP (!) 115/56   Pulse 62   Ht 5' 7" (1.702 m)   Wt 87.3 kg (192 lb 7.4 oz)   SpO2 96%   BMI 30.14 kg/m²     Physical Exam  Constitutional:       General: She is not in acute distress.     Appearance: Normal appearance. She is well-developed. She is not toxic-appearing or diaphoretic.   HENT:      Head: Normocephalic and atraumatic.      Nose: Nose normal.   Eyes:      General:         Right eye: No discharge.         Left eye: No discharge.      Conjunctiva/sclera:      Right eye: Right conjunctiva is not injected.      Left eye: Left conjunctiva is not injected.      Pupils: Pupils are equal.      Right eye: Pupil is round.      Left eye: Pupil is round.   Neck:      Thyroid: No thyromegaly.      Vascular: No carotid bruit or JVD.   Cardiovascular:      Rate and Rhythm: Normal rate and regular rhythm. No extrasystoles are present.     Chest Wall: PMI is not displaced.      Pulses:           Radial pulses are 2+ on the right side and 2+ on the left side.        Femoral pulses are 2+ on the right side and 2+ on the left side.       Dorsalis pedis pulses are 2+ on the right side and 2+ on the left side.        Posterior tibial pulses are 2+ on the right side and 2+ on the left side.      Heart sounds: S1 normal and S2 normal.      No gallop.   Pulmonary:      Effort: Pulmonary effort is normal.      Breath sounds: Normal breath sounds.   Abdominal:      Palpations: Abdomen is soft.      Tenderness: There is no abdominal tenderness.   Musculoskeletal:      Cervical back: Neck supple.      Right lower leg: Normal. No swelling. No edema.      Left lower leg: Normal. No swelling. No edema.   Lymphadenopathy:      Head:      Right side " "of head: No submandibular adenopathy.      Left side of head: No submandibular adenopathy.      Cervical: No cervical adenopathy.   Skin:     General: Skin is warm and dry.      Findings: No rash.      Nails: There is no clubbing.   Neurological:      General: No focal deficit present.      Mental Status: She is alert and oriented to person, place, and time. She is not disoriented.      Cranial Nerves: No cranial nerve deficit.   Psychiatric:         Attention and Perception: Attention and perception normal.         Mood and Affect: Mood and affect normal.         Speech: Speech normal.         Behavior: Behavior normal.         Thought Content: Thought content normal.         Cognition and Memory: Cognition and memory normal.         Judgment: Judgment normal.       Assessment:      1. History of chest pain    2. Ventricular premature beats    3. Ascending aorta dilatation    4. History of pulmonary embolism    5. Hypercholesterolemia    6. Overweight    7. Obstructive sleep apnea    8. History of cholecystectomy       Plan:       History of Chest Pain              6/7/2024: Chest pain after laparoscopic cholecystectomy.              6/8/2024: ECG: SR. Anterolateral ST-T wave changes.              6/8/2024: Echo: Normal left ventricular size and systolic function. EF 70%. Moderately dilated LA. Ascending aorta 4.2 cm. SPAP 39 mmHg.               6/8/2024: CTA Chest: The pulmonary trunk is dilated measuring 4 cm. Postoperative pneumoperitoneum related to recent cholecystectomy. Extensive subcutaneous emphysema seen along the right flank extending into the right lateral chest. Troponin 0.311. She was tender over right chest wall. Doubted pain due to myocardial ischemia.   12/3/2024: Stress Echo: 9:22 min. No CP. ECG negative. 1.0 mm ST depression in V6. Echo "negative". Normal left ventricular size and systolic function. EF 60%.              Elevated PA pressure and size of pulmonary trunk may possibly be explained " by CARI.               On losartan 25 mg Q24.               She was tender over right chest wall.              Does not appear pain due to myocardial ischemia.                    2. Ventricular Premature Contractions              Known frequent VPCs.   Occasional VPCs.    3. Ascending Aorta Enlargement   6/8/2024: Echo: Ascending aorta 4.2 cm.    On losartan 25 mg Q24.   6/2026: Plan Echo.     4. History of Pulmonary Embolism              2001: After abdominal surgery.              Received warfarin for 3-6 months.     5. Hypercholesterolemia              At home been on rosuvastatin 5 mg Q24 and niacin.   4/4/2023: Chol 156. HDL 52. LDL 95. TG 59.   6/12/2024: Rosuvastatin 5 mg Q24 was increased to rosuvastaton 10 mg Q24.   On rosuvastatin 10 mg Q24.   7/11/2024: Chol 142. HDL 68. LDL 61. TG 44.   On rosuvastatin 10 mg Q24.              Very favorable lipid panel.     6. Overweight  6/10/2024: Weight 80 kg. BMI 28.     7. Obstructive Sleep Apnea  On CPAP.   6.10/2025: Plan is tirzepatide.  Discussed importance of compliance.              8. History of Cholecystectomy              6/6/2024: Laparoscopic cholecystectomy.    9. Primary Care   Dr. Marie Steinberg.    F/u 12 months.    Wesley Patel M.D.

## 2025-06-11 ENCOUNTER — OFFICE VISIT (OUTPATIENT)
Dept: SPORTS MEDICINE | Facility: CLINIC | Age: 60
End: 2025-06-11
Payer: COMMERCIAL

## 2025-06-11 ENCOUNTER — HOSPITAL ENCOUNTER (OUTPATIENT)
Dept: RADIOLOGY | Facility: HOSPITAL | Age: 60
Discharge: HOME | End: 2025-06-11
Attending: ORTHOPAEDIC SURGERY
Payer: COMMERCIAL

## 2025-06-11 VITALS
HEIGHT: 67 IN | WEIGHT: 192.44 LBS | BODY MASS INDEX: 30.2 KG/M2 | DIASTOLIC BLOOD PRESSURE: 56 MMHG | SYSTOLIC BLOOD PRESSURE: 115 MMHG | HEART RATE: 62 BPM

## 2025-06-11 DIAGNOSIS — M25.522 LEFT ELBOW PAIN: ICD-10-CM

## 2025-06-11 DIAGNOSIS — M25.522 LEFT ELBOW PAIN: Primary | ICD-10-CM

## 2025-06-11 PROCEDURE — 99214 OFFICE O/P EST MOD 30 MIN: CPT | Mod: S$GLB,,, | Performed by: ORTHOPAEDIC SURGERY

## 2025-06-11 PROCEDURE — 73080 X-RAY EXAM OF ELBOW: CPT | Mod: 26,LT,, | Performed by: RADIOLOGY

## 2025-06-11 PROCEDURE — 3078F DIAST BP <80 MM HG: CPT | Mod: CPTII,S$GLB,, | Performed by: ORTHOPAEDIC SURGERY

## 2025-06-11 PROCEDURE — 1159F MED LIST DOCD IN RCRD: CPT | Mod: CPTII,S$GLB,, | Performed by: ORTHOPAEDIC SURGERY

## 2025-06-11 PROCEDURE — 73080 X-RAY EXAM OF ELBOW: CPT | Mod: TC,LT

## 2025-06-11 PROCEDURE — 99999 PR PBB SHADOW E&M-EST. PATIENT-LVL IV: CPT | Mod: PBBFAC,,, | Performed by: ORTHOPAEDIC SURGERY

## 2025-06-11 PROCEDURE — 3008F BODY MASS INDEX DOCD: CPT | Mod: CPTII,S$GLB,, | Performed by: ORTHOPAEDIC SURGERY

## 2025-06-11 PROCEDURE — 3074F SYST BP LT 130 MM HG: CPT | Mod: CPTII,S$GLB,, | Performed by: ORTHOPAEDIC SURGERY

## 2025-06-11 PROCEDURE — 4010F ACE/ARB THERAPY RXD/TAKEN: CPT | Mod: CPTII,S$GLB,, | Performed by: ORTHOPAEDIC SURGERY

## 2025-06-11 NOTE — PROGRESS NOTES
CHIEF COMPLAINT: Left Elbow pain.                                             HISTORY OF PRESENT ILLNESS:  The patient is a 59 y.o. female  who presents for evaluation of her left lateral elbow pain. She reports worsening left elbow pain over the last two months. She states she recently moved houses and was doing a lot of packing and lifting of boxes. She reports trouble gripping due to pain. She has not tried any conservative management at this time.     Pain Duration: 2 months  Pain Quality: sharp  Pain Context:worsening  Pain Timing: persistent  Pain Location:lateral  Pain Severity: severe  Aggrevating Factors:    Previous Treatments:  Associated Signs and Symptoms:none  History of Trauma: None    Pain is affecting ADLs.       PAST MEDICAL HISTORY:   Past Medical History:   Diagnosis Date    Arrhythmia     bigeminy & trigeminy     followed cardiologist    Pulmonary embolism     Bilateral    Sleep apnea     uses CPAP     PAST SURGICAL HISTORY:   Past Surgical History:   Procedure Laterality Date    ANKLE FRACTURE SURGERY Left     Plates & screw placed then removed 2024    APPENDECTOMY       SECTION N/A     COLONOSCOPY N/A 2024    Procedure: COLONOSCOPY;  Surgeon: Chucho Bennett MD;  Location: [address]);  Service: Endoscopy;  Laterality: N/A;    ESOPHAGOGASTRODUODENOSCOPY N/A 2024    Procedure: EGD (ESOPHAGOGASTRODUODENOSCOPY);  Surgeon: Chucho Bennett MD;  Location: [address]);  Service: Endoscopy;  Laterality: N/A;  referred by igor Rhodes scheduling concerns scheduled on 2nd floor due to only availability. suprep instructions sent to pt portal.  -precall complete-MS    Hip Scope Left     HYSTERECTOMY Bilateral     with tummy tuck    LAPAROSCOPIC CHOLECYSTECTOMY N/A 2024    Procedure: CHOLECYSTECTOMY, LAPAROSCOPIC;  Surgeon: Gumaro Murphy Jr., MD;  Location: [facility];  Service: General;  Laterality: N/A;    LAPAROSCOPIC LYSIS OF ADHESIONS N/A 2024     "Procedure: LYSIS, ADHESIONS, LAPAROSCOPIC;  Surgeon: Gumaro Murphy Jr., MD;  Location: [facility];  Service: General;  Laterality: N/A;    LAPAROSCOPY W/ MINI-LAPAROTOMY Right     SHOULDER ARTHROSCOPY       FAMILY HISTORY:   Family History   Problem Relation Name Age of Onset    Esophageal cancer Mother      Colon cancer Neg Hx       SOCIAL HISTORY: Social History[1]    MEDICATIONS: Current Medications[2]  ALLERGIES:   Review of patient's allergies indicates:   Allergen Reactions    Morphine Itching     Can take all other pain meds       VITAL SIGNS: BP (!) 115/56   Pulse 62   Ht 5' 7" (1.702 m)   Wt 87.3 kg (192 lb 7.4 oz)   BMI 30.14 kg/m²      Review of Systems   Constitution: Negative for chills, fever, weakness and weight loss.   HENT: Negative for congestion.   Cardiovascular: Negative for chest pain and dyspnea on exertion.   Respiratory: Negative for cough and shortness of breath.   Hematologic/Lymphatic: Does not bruise/bleed easily.   Skin: Negative for rash and suspicious lesions.   Musculoskeletal: see HPI  Gastrointestinal: Negative for bowel incontinence, constipation,diarrhea, vomiting.   Genitourinary: Negative for bladder incontinence.   Neurological: Negative for numbness, paresthesias and sensory change.       PHYSICAL EXAM:  General: Patient appears alert and oriented x 3.  Mood is pleasant.  Observation of ears, eyes and nose reveal no gross abnormalities.  No labored breathing observed.  Well nourished, in no acute distress and ambulates with a non-antalgic gait with no assistive devices.    Skin: Skin intact bilaterally. Sensation intact bilaterally. Compartments soft. No evidence of edema, infection, or induration.     Left ELBOW / WRIST EXTREMITY EXAM:    OBSERVATION / INSPECTION    Swelling  none    Deformity  none  Discoloration  none     Scars   none    Atrophy  none    TENDERNESS / CREPITUS (T / C):           T / C        Medial epicondyle   - / -    Med. (Ulnar) collateral ligament  - " / -    Flexor pronator Musculature   - / -   Biceps tendon    - / -   Head of radius    - / -    Lateral epicondyle   + / -    Extensor Musculature   - / -   Brachioradialis   - / -   Triceps tendon   - / -   Triceps muscle   - / -   Olecranon    - / -   Olecranon bursa   - / -   Cubital fossa    - / -   Anterior jointline   - / -   Radial tunnel    -/ -             ROM: ('*' = with pain)    Right Elbow  AROM (PROM)     Extension   0 deg  (5 deg)   Flexion   145 deg (145 deg)         Pronation  90 deg  (90 deg)      Supination   80 deg  (80 deg)                 Left Elbow  AROM (PROM)     Extension   0 deg  (5 deg) *  Flexion   145 deg (145 deg)         Pronation  90 deg  (90 deg)      Supination   80 deg  (80 deg)        Right Wrist  AROM (PROM)   Extension   80 deg (85 deg)   Flexion   80 deg (85 deg)         Ulnar Deviation   35 deg (40 deg)  Radial Deviation 35 deg (40 deg)             Left Wrist   AROM (PROM)     Extension   80 deg (85 deg)   Flexion   80 deg (85 deg)         Ulnar Deviation   35 deg (40 deg)  Radial Deviation 35 deg (40 deg)        STRENGTH: ('*' = with pain)    Elbow Flexion:   5/5  Elbow Extension:  5/5  Wrist Flexion:   5/5  Wrist Extension:  4/5*  :    4/5*  Intrinsics:   5/5  EPL (Ext. pollicis longus): 5/5  Pinch Mechanism:  5/5    ELBOW EXAMINATION:  See above noted areas of tenderness.   Test for Ligamentous Instability - UCL normal  Test for Ligamentous Instability - LUCL normal  PLRI       neg  Tinel's (Percussion) Test - Cubital  neg  Tennis Elbow Test    +  Golfer's Elbow Test    neg  Radial Capitellar Grind Test   neg  Valgus/Extension Overload Test  neg  Resisted Long Finger Extension Pain neg  Moving Valgus     neg  Forearm pain with resisted supination + over lateral epicondyle         EXTREMITY NEURO-VASCULAR EXAMINATION: Sensation grossly intact to light touch all dermatomal regions. DTR 2+ Biceps, Triceps, BR and Negative Jade's sign. Grossly intact motor function at  Elbow, Wrist and Hand. Distal pulses radial and ulnar 2+, brisk cap refill, symmetric.    Other Findings:      Xrays: (AP, lateral, oblique) Left elbow ordered and reviewed by me personally today: no evidence of fracture or dislocation.  Osseous structures appear intact.    ASSESSMENT:   Left Tennis Elbow/lateral epicondylitis     PLAN:  OTC NSAIDs as needed  Tennis elbow strap  Recommended manny twist bar -- appropriate use was demonstrated in clinic today  Follow up as needed   All patients questions were answered. Patient understands diagnosis and treatment options.              [1]   Social History  Socioeconomic History    Marital status:    Tobacco Use    Smoking status: Never    Smokeless tobacco: Never   Substance and Sexual Activity    Alcohol use: Yes     Comment: socially    Drug use: Never    Sexual activity: Yes     Partners: Male     Birth control/protection: None     Social Drivers of Health     Financial Resource Strain: Low Risk  (6/24/2024)    Received from [facility]    Overall Financial Resource Strain (CARDIA)     Difficulty of Paying Living Expenses: Not hard at all   Food Insecurity: No Food Insecurity (6/24/2024)    Received from [facility]    Hunger Vital Sign     Worried About Running Out of Food in the Last Year: Never true     Ran Out of Food in the Last Year: Never true   Transportation Needs: No Transportation Needs (6/24/2024)    Received from [facility]    PRAPARE - Transportation     Lack of Transportation (Medical): No     Lack of Transportation (Non-Medical): No   Physical Activity: Sufficiently Active (6/8/2024)    Exercise Vital Sign     Days of Exercise per Week: 7 days     Minutes of Exercise per Session: 30 min   Stress: No Stress Concern Present (6/9/2024)    [employer] [place] [employer] - Occupational Stress Questionnaire     Feeling of Stress : Not at all   Housing Stability: Low Risk  (6/24/2024)    Received from [facility]    Housing Stability Vital  Sign     Unable to Pay for Housing in the Last Year: No     Number of Places Lived in the Last Year: 1     Unstable Housing in the Last Year: No   [2]   Current Outpatient Medications:     biotin 5 mg Tab, Take 5 mg by mouth., Disp: , Rfl:     calcium carbonate/vitamin D3 (VITAMIN D-3 ORAL), Take by mouth once daily., Disp: , Rfl:     coenzyme Q10 (CO Q-10) 100 mg capsule, Take 100 mg by mouth once daily., Disp: , Rfl:     dicyclomine (BENTYL) 10 MG capsule, Take 10 mg by mouth every 6 (six) hours as needed., Disp: , Rfl:     estradioL (ESTRACE) 0.01 % (0.1 mg/gram) vaginal cream, , Disp: , Rfl:     fluticasone propionate (FLONASE) 50 mcg/actuation nasal spray, , Disp: , Rfl:     gabapentin (NEURONTIN) 300 MG capsule, Take 300 mg by mouth 3 (three) times daily., Disp: , Rfl:     L. acidophilus/Bifid. animalis (PROBIOTIC) 5 billion cell CpSP, Take by mouth once daily., Disp: , Rfl:     losartan (COZAAR) 25 MG tablet, Take 1 tablet (25 mg total) by mouth every evening., Disp: 90 tablet, Rfl: 3    multivitamin (THERAGRAN) per tablet, Take 1 tablet by mouth once daily., Disp: , Rfl:     psyllium husk (METAMUCIL ORAL), Take by mouth once daily., Disp: , Rfl:     rosuvastatin (CRESTOR) 10 MG tablet, Take 1 tablet (10 mg total) by mouth once daily., Disp: 90 tablet, Rfl: 3    meloxicam (MOBIC) 15 MG tablet, Take 1 tablet (15 mg total) by mouth once daily. (Patient not taking: Reported on 11/25/2024), Disp: 30 tablet, Rfl: 3    omega-3 fatty acids/fish oil (FISH OIL-OMEGA-3 FATTY ACIDS) 300-1,000 mg capsule, Take 2 g by mouth. (Patient not taking: Reported on 6/11/2025), Disp: , Rfl:     ondansetron (ZOFRAN-ODT) 4 MG TbDL, Take 1 tablet (4 mg total) by mouth 2 (two) times daily. (Patient not taking: Reported on 6/11/2025), Disp: 12 tablet, Rfl: 0    oxyCODONE-acetaminophen (PERCOCET) 5-325 mg per tablet, Take 1 tablet by mouth every 6 (six) hours as needed for Pain., Disp: 20 tablet, Rfl: 0    oxyCODONE-acetaminophen  (PERCOCET) 7.5-325 mg per tablet, Take 1 tablet by mouth every 4 (four) hours as needed for Pain. (Patient not taking: Reported on 11/25/2024), Disp: 20 tablet, Rfl: 0    riboflavin, vitamin B2, (VITAMIN B-2 ORAL), Take by mouth once daily. (Patient not taking: Reported on 6/11/2025), Disp: , Rfl:

## (undated) DEVICE — CLOSURE SKIN STERI STRIP 1/2X4

## (undated) DEVICE — GLOVE SURGEON SYN PF SZ 9

## (undated) DEVICE — TROCAR KII FIOS 5MM X 100MM

## (undated) DEVICE — POSITIONER IV ARMBOARD FOAM

## (undated) DEVICE — GLOVE BIOGEL SKINSENSE PI 7.0

## (undated) DEVICE — DRAPE STERI INSTRUMENT 1018

## (undated) DEVICE — ELECTRODE REM PLYHSV RETURN 9

## (undated) DEVICE — APPLICATOR CHLORAPREP ORN 26ML

## (undated) DEVICE — SEE MEDLINE ITEM 152622

## (undated) DEVICE — DRAPE SURGICAL STERI IRRG PCH

## (undated) DEVICE — BAG TISSUE RETRIEVAL 225ML

## (undated) DEVICE — IRRIGATOR ENDOSCOPY DISP.

## (undated) DEVICE — WRAPON POLAR PAD HIP FOR POLAR

## (undated) DEVICE — GAUZE SPONGE 4X4 12PLY

## (undated) DEVICE — SOL NORMAL USPCA 0.9%

## (undated) DEVICE — ELECTRODE BLD EXT INSUL 1

## (undated) DEVICE — SYR B-D DISP CONTROL 10CC100/C

## (undated) DEVICE — KITTNER ENDOSCOPIC BLNT 5MM

## (undated) DEVICE — PAD ABD 8X10 STERILE

## (undated) DEVICE — SUT ETHILON 3-0 PS2 18 BLK

## (undated) DEVICE — UNDERGLOVES BIOGEL PI SIZE 7.5

## (undated) DEVICE — GOWN SMART IMP BREATHABLE XXLG

## (undated) DEVICE — TUBE SET INFLOW/OUTFLOW

## (undated) DEVICE — NDL INSUFFLATION VERRES 120MM

## (undated) DEVICE — PAD ELECTRODE STER 1.5X3

## (undated) DEVICE — SEE MEDLINE ITEM 157216

## (undated) DEVICE — DRAPE XRAY EQUIPMENT UNIV

## (undated) DEVICE — SWAB CULTURETTE II DUAL

## (undated) DEVICE — NDL HYPO REG 25G X 1 1/2

## (undated) DEVICE — SOL IRR SOD CHL .9% POUR

## (undated) DEVICE — STOPCOCK ROT ADPT M CLLR 3-WAY

## (undated) DEVICE — Device

## (undated) DEVICE — KIT WING PAD POSITIONING

## (undated) DEVICE — APPLIER CLIP ENDO LIGAMAX 5MM

## (undated) DEVICE — SHAVER ULTRAFFR 4.2MM

## (undated) DEVICE — SET TRI-LUMEN FILTERED TUBE

## (undated) DEVICE — SEE MEDLINE ITEM 157150

## (undated) DEVICE — SOL POVIDONE SCRUB IODINE 4 OZ

## (undated) DEVICE — SOL IRR NACL .9% 3000ML

## (undated) DEVICE — DRAIN WND 15FRX3/16X4.7MM TRCR

## (undated) DEVICE — PROBE MEGAVAC 90 AMBIENT

## (undated) DEVICE — SHEARS HARMONIC CRVD TIP 36CM

## (undated) DEVICE — SHEET HIP ABSORB CIRC ELAS 8

## (undated) DEVICE — PROBE ARTHO ENERGY 90 DEG

## (undated) DEVICE — SOL 9P NACL IRR PIC IL

## (undated) DEVICE — DRAPE PLASTIC U 60X72

## (undated) DEVICE — GLOVE ORTHO PF SZ 8.5

## (undated) DEVICE — SYR 10CC LUER LOCK

## (undated) DEVICE — PENCIL ELECTROSURG HOLST W/BLD

## (undated) DEVICE — PORT ACCESS 5MM W/120MM

## (undated) DEVICE — BLADE SHAVER 4.5 6/BX

## (undated) DEVICE — TAPE SURG MEDIPORE 6X72IN

## (undated) DEVICE — ADHESIVE DERMABOND ADVANCED

## (undated) DEVICE — COLLECTOR SPECIMEN ANAEROBIC

## (undated) DEVICE — SYS SEE SHARP SCP ANTIFG LNG

## (undated) DEVICE — GLOVE SENSICARE PI MICRO 6.5

## (undated) DEVICE — SEE MEDLINE ITEM 157166

## (undated) DEVICE — NDL HYPO A BEVEL 25X1 1/2

## (undated) DEVICE — DRAPE INCISE IOBAN 2 23X17IN

## (undated) DEVICE — GLOVE SENSICARE PI MICRO 8

## (undated) DEVICE — DRESSING XEROFORM FOIL PK 1X8

## (undated) DEVICE — SUT VICRYL 0 27 CT-2

## (undated) DEVICE — SEE MEDLINE ITEM 152530

## (undated) DEVICE — SET EXT CLEARLINK LL 44IN

## (undated) DEVICE — TROCAR KII FIOS 11MM X 100MM

## (undated) DEVICE — SUT MCRYL PLUS 4-0 PS2 27IN